# Patient Record
Sex: MALE | Race: WHITE | NOT HISPANIC OR LATINO
[De-identification: names, ages, dates, MRNs, and addresses within clinical notes are randomized per-mention and may not be internally consistent; named-entity substitution may affect disease eponyms.]

---

## 2017-01-28 PROBLEM — Z00.00 ENCOUNTER FOR PREVENTIVE HEALTH EXAMINATION: Status: ACTIVE | Noted: 2017-01-28

## 2017-02-06 ENCOUNTER — APPOINTMENT (OUTPATIENT)
Dept: OPHTHALMOLOGY | Facility: CLINIC | Age: 77
End: 2017-02-06

## 2017-02-17 ENCOUNTER — APPOINTMENT (OUTPATIENT)
Dept: OPHTHALMOLOGY | Facility: AMBULATORY SURGERY CENTER | Age: 77
End: 2017-02-17

## 2017-02-18 ENCOUNTER — APPOINTMENT (OUTPATIENT)
Dept: OPHTHALMOLOGY | Facility: CLINIC | Age: 77
End: 2017-02-18

## 2017-02-23 ENCOUNTER — APPOINTMENT (OUTPATIENT)
Dept: OPHTHALMOLOGY | Facility: CLINIC | Age: 77
End: 2017-02-23

## 2017-03-20 ENCOUNTER — APPOINTMENT (OUTPATIENT)
Dept: OPHTHALMOLOGY | Facility: CLINIC | Age: 77
End: 2017-03-20

## 2017-07-10 ENCOUNTER — APPOINTMENT (OUTPATIENT)
Dept: OPHTHALMOLOGY | Facility: CLINIC | Age: 77
End: 2017-07-10

## 2018-02-05 ENCOUNTER — APPOINTMENT (OUTPATIENT)
Dept: OPHTHALMOLOGY | Facility: CLINIC | Age: 78
End: 2018-02-05

## 2020-10-12 ENCOUNTER — INPATIENT (INPATIENT)
Facility: HOSPITAL | Age: 80
LOS: 4 days | Discharge: HOME | DRG: 377 | End: 2020-10-17
Attending: INTERNAL MEDICINE | Admitting: INTERNAL MEDICINE
Payer: MEDICARE

## 2020-10-12 VITALS
RESPIRATION RATE: 18 BRPM | WEIGHT: 171.52 LBS | HEART RATE: 83 BPM | OXYGEN SATURATION: 100 % | TEMPERATURE: 98 F | SYSTOLIC BLOOD PRESSURE: 119 MMHG | DIASTOLIC BLOOD PRESSURE: 50 MMHG

## 2020-10-12 DIAGNOSIS — R55 SYNCOPE AND COLLAPSE: ICD-10-CM

## 2020-10-12 DIAGNOSIS — K92.2 GASTROINTESTINAL HEMORRHAGE, UNSPECIFIED: ICD-10-CM

## 2020-10-12 DIAGNOSIS — R06.00 DYSPNEA, UNSPECIFIED: ICD-10-CM

## 2020-10-12 DIAGNOSIS — D64.9 ANEMIA, UNSPECIFIED: ICD-10-CM

## 2020-10-12 DIAGNOSIS — Z29.9 ENCOUNTER FOR PROPHYLACTIC MEASURES, UNSPECIFIED: ICD-10-CM

## 2020-10-12 DIAGNOSIS — E11.9 TYPE 2 DIABETES MELLITUS WITHOUT COMPLICATIONS: ICD-10-CM

## 2020-10-12 LAB
ALBUMIN SERPL ELPH-MCNC: 2.4 G/DL — LOW (ref 3.5–5)
ALP SERPL-CCNC: 56 U/L — SIGNIFICANT CHANGE UP (ref 40–120)
ALT FLD-CCNC: 21 U/L DA — SIGNIFICANT CHANGE UP (ref 10–60)
ANION GAP SERPL CALC-SCNC: 9 MMOL/L — SIGNIFICANT CHANGE UP (ref 5–17)
APPEARANCE UR: ABNORMAL
APTT BLD: 24.9 SEC — LOW (ref 27.5–35.5)
AST SERPL-CCNC: 28 U/L — SIGNIFICANT CHANGE UP (ref 10–40)
BASOPHILS # BLD AUTO: 0 K/UL — SIGNIFICANT CHANGE UP (ref 0–0.2)
BASOPHILS NFR BLD AUTO: 0 % — SIGNIFICANT CHANGE UP (ref 0–2)
BILIRUB SERPL-MCNC: 0.4 MG/DL — SIGNIFICANT CHANGE UP (ref 0.2–1.2)
BILIRUB UR-MCNC: NEGATIVE — SIGNIFICANT CHANGE UP
BUN SERPL-MCNC: 44 MG/DL — HIGH (ref 7–18)
CALCIUM SERPL-MCNC: 8.7 MG/DL — SIGNIFICANT CHANGE UP (ref 8.4–10.5)
CHLORIDE SERPL-SCNC: 104 MMOL/L — SIGNIFICANT CHANGE UP (ref 96–108)
CO2 SERPL-SCNC: 22 MMOL/L — SIGNIFICANT CHANGE UP (ref 22–31)
COLOR SPEC: YELLOW — SIGNIFICANT CHANGE UP
CREAT SERPL-MCNC: 1.84 MG/DL — HIGH (ref 0.5–1.3)
DIFF PNL FLD: ABNORMAL
EOSINOPHIL # BLD AUTO: 0.09 K/UL — SIGNIFICANT CHANGE UP (ref 0–0.5)
EOSINOPHIL NFR BLD AUTO: 1.6 % — SIGNIFICANT CHANGE UP (ref 0–6)
FERRITIN SERPL-MCNC: 13 NG/ML — LOW (ref 30–400)
GLUCOSE BLDC GLUCOMTR-MCNC: 112 MG/DL — HIGH (ref 70–99)
GLUCOSE SERPL-MCNC: 147 MG/DL — HIGH (ref 70–99)
GLUCOSE UR QL: NEGATIVE — SIGNIFICANT CHANGE UP
HCT VFR BLD CALC: 11.9 % — CRITICAL LOW (ref 39–50)
HCT VFR BLD CALC: 12.7 % — CRITICAL LOW (ref 39–50)
HGB BLD-MCNC: 3.6 G/DL — CRITICAL LOW (ref 13–17)
HGB BLD-MCNC: 3.9 G/DL — CRITICAL LOW (ref 13–17)
IMM GRANULOCYTES NFR BLD AUTO: 0.4 % — SIGNIFICANT CHANGE UP (ref 0–1.5)
INR BLD: 1.24 RATIO — HIGH (ref 0.88–1.16)
IRON SATN MFR SERPL: 11 UG/DL — LOW (ref 65–170)
IRON SATN MFR SERPL: 4 % — LOW (ref 20–55)
KETONES UR-MCNC: NEGATIVE — SIGNIFICANT CHANGE UP
LEUKOCYTE ESTERASE UR-ACNC: ABNORMAL
LYMPHOCYTES # BLD AUTO: 0.33 K/UL — LOW (ref 1–3.3)
LYMPHOCYTES # BLD AUTO: 5.8 % — LOW (ref 13–44)
MCHC RBC-ENTMCNC: 24.5 PG — LOW (ref 27–34)
MCHC RBC-ENTMCNC: 30.7 GM/DL — LOW (ref 32–36)
MCV RBC AUTO: 79.9 FL — LOW (ref 80–100)
MONOCYTES # BLD AUTO: 0.64 K/UL — SIGNIFICANT CHANGE UP (ref 0–0.9)
MONOCYTES NFR BLD AUTO: 11.3 % — SIGNIFICANT CHANGE UP (ref 2–14)
NEUTROPHILS # BLD AUTO: 4.59 K/UL — SIGNIFICANT CHANGE UP (ref 1.8–7.4)
NEUTROPHILS NFR BLD AUTO: 80.9 % — HIGH (ref 43–77)
NITRITE UR-MCNC: NEGATIVE — SIGNIFICANT CHANGE UP
NRBC # BLD: 0 /100 WBCS — SIGNIFICANT CHANGE UP (ref 0–0)
OB PNL STL: NEGATIVE — SIGNIFICANT CHANGE UP
PH UR: 5 — SIGNIFICANT CHANGE UP (ref 5–8)
PLATELET # BLD AUTO: 224 K/UL — SIGNIFICANT CHANGE UP (ref 150–400)
POTASSIUM SERPL-MCNC: 4.6 MMOL/L — SIGNIFICANT CHANGE UP (ref 3.5–5.3)
POTASSIUM SERPL-SCNC: 4.6 MMOL/L — SIGNIFICANT CHANGE UP (ref 3.5–5.3)
PROT SERPL-MCNC: 5.9 G/DL — LOW (ref 6–8.3)
PROT UR-MCNC: 100
PROTHROM AB SERPL-ACNC: 14.6 SEC — HIGH (ref 10.6–13.6)
RBC # BLD: 1.59 M/UL — LOW (ref 4.2–5.8)
RBC # FLD: 18.6 % — HIGH (ref 10.3–14.5)
SARS-COV-2 RNA SPEC QL NAA+PROBE: SIGNIFICANT CHANGE UP
SODIUM SERPL-SCNC: 135 MMOL/L — SIGNIFICANT CHANGE UP (ref 135–145)
SP GR SPEC: 1.02 — SIGNIFICANT CHANGE UP (ref 1.01–1.02)
TIBC SERPL-MCNC: 316 UG/DL — SIGNIFICANT CHANGE UP (ref 250–450)
TROPONIN I SERPL-MCNC: <0.015 NG/ML — SIGNIFICANT CHANGE UP (ref 0–0.04)
UIBC SERPL-MCNC: 305 UG/DL — SIGNIFICANT CHANGE UP (ref 110–370)
UROBILINOGEN FLD QL: NEGATIVE — SIGNIFICANT CHANGE UP
WBC # BLD: 5.67 K/UL — SIGNIFICANT CHANGE UP (ref 3.8–10.5)
WBC # FLD AUTO: 5.67 K/UL — SIGNIFICANT CHANGE UP (ref 3.8–10.5)

## 2020-10-12 PROCEDURE — 74174 CTA ABD&PLVS W/CONTRAST: CPT | Mod: 26

## 2020-10-12 PROCEDURE — 99285 EMERGENCY DEPT VISIT HI MDM: CPT

## 2020-10-12 RX ORDER — INSULIN LISPRO 100/ML
VIAL (ML) SUBCUTANEOUS
Refills: 0 | Status: DISCONTINUED | OUTPATIENT
Start: 2020-10-12 | End: 2020-10-15

## 2020-10-12 RX ORDER — PANTOPRAZOLE SODIUM 20 MG/1
40 TABLET, DELAYED RELEASE ORAL
Refills: 0 | Status: DISCONTINUED | OUTPATIENT
Start: 2020-10-12 | End: 2020-10-17

## 2020-10-12 NOTE — H&P ADULT - HISTORY OF PRESENT ILLNESS
80M from home, self-ambulating with PMH of chronic anemia, DM, chronic suprapubic catheter, chronic leg swelling, comes to the ED after a syncopal episode today. Pt was in his usual state of health until 4 days ago when he first noted black tarry stool. He had a repeat episode the nest day and has been feeling weak and lethargic since then. He called his doctor friends in [place] who suggested him to visit ED. Today morning when he got off from his bed he was noted to be dizzy and then fell down. Pt doesn't remember distinctly but thinks he had LOC. Pt denies any chest pain, shortness of breath, palpitations, N/V, Abd pain, diarrhea or any NSAID or blood thinner use.   Pt stays alone at home. 8 months ago his wife was diagnosed with Brain cancer and is admitted in the [facility].     In the ED,   Pt appears comfortable, no signs of any distress.   No active bleeding in ED   Vitals- 114/52, Hr 85, afebrile   CT abd- Cirrohtic liver with portal HTN, varices, concerns for SVT   Hb- 3.8  Pt received 1 unit of PRBC. PLan to give 2 more 80M from home, self-ambulating with PMH of chronic anemia, DM, chronic suprapubic catheter, chronic leg swelling, comes to the ED after a syncopal episode today. Pt was in his usual state of health until 4 days ago when he first noted black tarry stool. He had a repeat episode the nest day and has been feeling weak and lethargic since then. He called his doctor friends in [place] who suggested him to visit ED. Today morning when he got off from his bed he was noted to be dizzy and then fell down. Pt doesn't remember distinctly but thinks he had LOC. Pt denies any chest pain, shortness of breath, palpitations, N/V, Abd pain, diarrhea or any NSAID or blood thinner use.   Pt stays alone at home. 8 months ago his wife was diagnosed with Brain cancer and is admitted in the [facility].   Pt denies any smoking, alcohol or any form of substance abuse.   In the ED,   Pt appears comfortable, no signs of any distress.   No active bleeding in ED   Vitals- 114/52, Hr 85, afebrile   CT abd- Cirrohtic liver with portal HTN, varices, concerns for SVT   Hb- 3.8  Pt received 1 unit of PRBC. PLan to give 2 more

## 2020-10-12 NOTE — ED ADULT TRIAGE NOTE - CHIEF COMPLAINT QUOTE
dizziness for 4 days with constipation and lethargy. Pt. has black stool two days ago. Pt. has chronic Morgan catheter.

## 2020-10-12 NOTE — H&P ADULT - NSHPLABSRESULTS_GEN_ALL_CORE
3.6    x     )-----------( x        ( 12 Oct 2020 17:24 )             11.9       10-12    135  |  104  |  44<H>  ----------------------------<  147<H>  4.6   |  22  |  1.84<H>    Ca    8.7      12 Oct 2020 16:50    TPro  5.9<L>  /  Alb  2.4<L>  /  TBili  0.4  /  DBili  x   /  AST  28  /  ALT  21  /  AlkPhos  56  10-12              Urinalysis Basic - ( 12 Oct 2020 17:24 )    Color: Yellow / Appearance: Slightly Turbid / S.020 / pH: x  Gluc: x / Ketone: Negative  / Bili: Negative / Urobili: Negative   Blood: x / Protein: 100 / Nitrite: Negative   Leuk Esterase: Moderate / RBC: 2-5 /HPF / WBC 26-50 /HPF   Sq Epi: x / Non Sq Epi: Occasional /HPF / Bacteria: Few /HPF        PT/INR - ( 12 Oct 2020 16:50 )   PT: 14.6 sec;   INR: 1.24 ratio         PTT - ( 12 Oct 2020 16:50 )  PTT:24.9 sec    Lactate Trend      CARDIAC MARKERS ( 12 Oct 2020 16:50 )  <0.015 ng/mL / x     / x     / x     / x            CAPILLARY BLOOD GLUCOSE      POCT Blood Glucose.: 186 mg/dL (12 Oct 2020 15:56)        ct< from: CT Angio Abdomen and Pelvis w/ IV Cont (10.12.20 @ 18:15) >    IMPRESSION:  No active extravasation.    Cirrhotic liver with stigmata of portal hypertension with extensive left gastric, splenic and mesenteric varices.    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    Mild right hydroureteronephrosis without demonstratable obstructing lesions.    Multiple gallstones.    < end of copied text >

## 2020-10-12 NOTE — H&P ADULT - NSHPSOCIALHISTORY_GEN_ALL_CORE
Pt stays alone at home. 8 months ago his wife was diagnosed with Brain cancer and is admitted in the [facility]. Pt usuallly gets his food delivered

## 2020-10-12 NOTE — H&P ADULT - ASSESSMENT
80M from home, self-ambulating with PMH of chronic anemia, DM, chronic suprapubic catheter, chronic leg swelling, comes to the ED after a syncopal episode today. Pt was in his usual state of health until 4 days ago when he first noted black tarry stool.     In the ED,   Pt appears comfortable, no signs of any distress.   No active bleeding in ED   Vitals- 114/52, Hr 85, afebrile   CT abd- Cirrohtic liver with portal HTN, varices, concerns for SVT   Hb- 3.8  Pt received 1 unit of PRBC. PLan to give 2 more

## 2020-10-12 NOTE — ED ADULT NURSE NOTE - OBJECTIVE STATEMENT
BIba c/o dark stools 4 days  ago  and dizziness, has suprapubic catheter, no active bleeding noted, now c/o constipation

## 2020-10-12 NOTE — ED PROVIDER NOTE - OBJECTIVE STATEMENT
79 y/o male with PMHx of DM, chronic anemia presents to the ED c/o dizziness x 4 days. Pt notes lightheaded dizziness where he feels like he is going to collapse after walking short distances. Pt notes associated melena, which he has never had before. Pt relays feeling fatigued and generally unwell. Pt with Hx suprapubic joya catheter due to chronic urinary retention. Pt denies nausea, vomiting, chest pain, shortness of breath, or any other complaints. No urinary complications related to the catheter. NKDA.

## 2020-10-12 NOTE — H&P ADULT - PROBLEM SELECTOR PLAN 5
IMPROVE VTE Individual Risk Assessment    RISK                                                          Points  [] Previous VTE                                           3  [] Thrombophilia                                        2  [] Lower limb paralysis                              2   [] Current Cancer                                       2   [x] Immobilization > 24 hrs                        1  [] ICU/CCU stay > 24 hours                       1  [x Age > 60                                                   1    IMPROVE VTE Score: 2  No dvt ppx for gi bleeding, pT ON SCD  PP1

## 2020-10-12 NOTE — H&P ADULT - PROBLEM SELECTOR PLAN 1
Pt admitted for syncopal episode today  Pt has justin x 4 days  No prior episodes before.  CT abd- Cirrohtic liver with portal HTN, varices, concerns for SVT   Hb on admission 3.8   s/p 1 prbc. Ordered 2 more   Will f/u CBC post tranfusions  lasix to be given after the second PRBC  f/u hepatitis panel   f/u USG abd for cirrohsis   f/u ct chest to r/o any underlying malignancy   GI -  consult Dr. Lopez  Hematology- [facility]  f/u CBC q8   Pt has PMH of ?Hepatitis in past   CTA abd-

## 2020-10-12 NOTE — H&P ADULT - ATTENDING COMMENTS
80M from home, self-ambulating with PMH of chronic anemia, DM, chronic suprapubic catheter, chronic leg swelling, comes to the ED for dizziness associated black tarry stools x 3 days.   Patient seen and examined earlier today at 8 pm.     T(C): 36.6 (10-12-20 @ 21:15), Max: 36.9 (10-12-20 @ 18:45)  HR: 85 (10-12-20 @ 21:15) (85 - 96)  BP: 114/52 (10-12-20 @ 21:15) (110/65 - 120/62)  RR: 18 (10-12-20 @ 21:15) (16 - 18)  SpO2: 100% (10-12-20 @ 21:15) (100% - 100%)    Reviewed labs and imaging. 80M from home, self-ambulating with PMH of chronic anemia, DM, chronic suprapubic catheter, chronic leg swelling, comes to the ED for dizziness associated black tarry stools x 3 days.   Patient seen and examined earlier today at 8 pm.     T(C): 36.6 (10-12-20 @ 21:15), Max: 36.9 (10-12-20 @ 18:45)  HR: 85 (10-12-20 @ 21:15) (85 - 96)  BP: 114/52 (10-12-20 @ 21:15) (110/65 - 120/62)  RR: 18 (10-12-20 @ 21:15) (16 - 18)  SpO2: 100% (10-12-20 @ 21:15) (100% - 100%)    Reviewed labs and imaging.  Symptomatic anemia- Transfuse PRBC, PTCBC. GI consult. Reviewed CT findings with House staff, with varices splenic thrombosis- likely Liver cirrhosis. Patient states that he has hx Hepatitis. Check Hep panel. CT chest r/o occult malignancy.   Follow up Abd sono.

## 2020-10-12 NOTE — ED PROVIDER NOTE - CLINICAL SUMMARY MEDICAL DECISION MAKING FREE TEXT BOX
80M with generalized weakness and pallor. Suspect GI bleed. CTA, abd labs, cardiac labs, type&screen and reassess.

## 2020-10-12 NOTE — H&P ADULT - NSHPPHYSICALEXAM_GEN_ALL_CORE
Vital Signs (24 Hrs):  T(C): 36.6 (10-12-20 @ 21:15), Max: 36.9 (10-12-20 @ 18:45)  HR: 85 (10-12-20 @ 21:15) (85 - 96)  BP: 114/52 (10-12-20 @ 21:15) (110/65 - 120/62)  RR: 18 (10-12-20 @ 21:15) (16 - 18)  SpO2: 100% (10-12-20 @ 21:15) (100% - 100%)  Wt(kg): --  Daily Height in cm: 182.88 (12 Oct 2020 15:51)    Daily     I&O's Summary

## 2020-10-12 NOTE — ED PROVIDER NOTE - MUSCULOSKELETAL, MLM
Spine appears normal, range of motion is not limited, no muscle or joint tenderness.  BLE 2+ PITTING EDEMA.

## 2020-10-12 NOTE — H&P ADULT - PROBLEM SELECTOR PLAN 4
Pt had PMH of syncopy today, unwitnessed  Pt mentions being dizzy after he woke up from sleep and walked towards restroom  EKG - NSR   trops x1 negative   f/u Orthostatics, Carotid and Echo   Symptoms likely from anemia   Monitor on telemetry

## 2020-10-12 NOTE — ED ADULT NURSE NOTE - NS ED NURSE RECORD ANOTHER VITAL SIGN
Yes

## 2020-10-13 DIAGNOSIS — N17.9 ACUTE KIDNEY FAILURE, UNSPECIFIED: ICD-10-CM

## 2020-10-13 DIAGNOSIS — K74.60 UNSPECIFIED CIRRHOSIS OF LIVER: ICD-10-CM

## 2020-10-13 DIAGNOSIS — D64.9 ANEMIA, UNSPECIFIED: ICD-10-CM

## 2020-10-13 DIAGNOSIS — Z71.89 OTHER SPECIFIED COUNSELING: ICD-10-CM

## 2020-10-13 DIAGNOSIS — Z02.9 ENCOUNTER FOR ADMINISTRATIVE EXAMINATIONS, UNSPECIFIED: ICD-10-CM

## 2020-10-13 LAB
ALBUMIN SERPL ELPH-MCNC: 2.2 G/DL — LOW (ref 3.5–5)
ALP SERPL-CCNC: 54 U/L — SIGNIFICANT CHANGE UP (ref 40–120)
ALT FLD-CCNC: 18 U/L DA — SIGNIFICANT CHANGE UP (ref 10–60)
ANION GAP SERPL CALC-SCNC: 7 MMOL/L — SIGNIFICANT CHANGE UP (ref 5–17)
APTT BLD: 22.7 SEC — LOW (ref 27.5–35.5)
AST SERPL-CCNC: 24 U/L — SIGNIFICANT CHANGE UP (ref 10–40)
BASOPHILS # BLD AUTO: 0.01 K/UL — SIGNIFICANT CHANGE UP (ref 0–0.2)
BASOPHILS NFR BLD AUTO: 0.2 % — SIGNIFICANT CHANGE UP (ref 0–2)
BILIRUB SERPL-MCNC: 1.1 MG/DL — SIGNIFICANT CHANGE UP (ref 0.2–1.2)
BUN SERPL-MCNC: 41 MG/DL — HIGH (ref 7–18)
CALCIUM SERPL-MCNC: 8.2 MG/DL — LOW (ref 8.4–10.5)
CHLORIDE SERPL-SCNC: 105 MMOL/L — SIGNIFICANT CHANGE UP (ref 96–108)
CHOLEST SERPL-MCNC: 110 MG/DL — SIGNIFICANT CHANGE UP (ref 10–199)
CO2 SERPL-SCNC: 25 MMOL/L — SIGNIFICANT CHANGE UP (ref 22–31)
CREAT SERPL-MCNC: 1.43 MG/DL — HIGH (ref 0.5–1.3)
EOSINOPHIL # BLD AUTO: 0.26 K/UL — SIGNIFICANT CHANGE UP (ref 0–0.5)
EOSINOPHIL NFR BLD AUTO: 5.4 % — SIGNIFICANT CHANGE UP (ref 0–6)
FERRITIN SERPL-MCNC: 13 NG/ML — LOW (ref 30–400)
FOLATE SERPL-MCNC: 7.7 NG/ML — SIGNIFICANT CHANGE UP
GLUCOSE BLDC GLUCOMTR-MCNC: 100 MG/DL — HIGH (ref 70–99)
GLUCOSE BLDC GLUCOMTR-MCNC: 103 MG/DL — HIGH (ref 70–99)
GLUCOSE BLDC GLUCOMTR-MCNC: 106 MG/DL — HIGH (ref 70–99)
GLUCOSE BLDC GLUCOMTR-MCNC: 199 MG/DL — HIGH (ref 70–99)
GLUCOSE BLDC GLUCOMTR-MCNC: 238 MG/DL — HIGH (ref 70–99)
GLUCOSE SERPL-MCNC: 92 MG/DL — SIGNIFICANT CHANGE UP (ref 70–99)
HAV IGM SER-ACNC: SIGNIFICANT CHANGE UP
HBV CORE IGM SER-ACNC: SIGNIFICANT CHANGE UP
HBV SURFACE AG SER-ACNC: SIGNIFICANT CHANGE UP
HCT VFR BLD CALC: 21 % — CRITICAL LOW (ref 39–50)
HCT VFR BLD CALC: 23.4 % — LOW (ref 39–50)
HCT VFR BLD CALC: 23.7 % — LOW (ref 39–50)
HCV AB S/CO SERPL IA: 0.14 S/CO — SIGNIFICANT CHANGE UP (ref 0–0.99)
HCV AB SERPL-IMP: SIGNIFICANT CHANGE UP
HDLC SERPL-MCNC: 25 MG/DL — LOW
HGB BLD-MCNC: 7.1 G/DL — LOW (ref 13–17)
HGB BLD-MCNC: 8.1 G/DL — LOW (ref 13–17)
HGB BLD-MCNC: 8.1 G/DL — LOW (ref 13–17)
IMM GRANULOCYTES NFR BLD AUTO: 0.4 % — SIGNIFICANT CHANGE UP (ref 0–1.5)
INR BLD: 1.18 RATIO — HIGH (ref 0.88–1.16)
LACTATE SERPL-SCNC: 1 MMOL/L — SIGNIFICANT CHANGE UP (ref 0.7–2)
LDH SERPL L TO P-CCNC: 347 U/L — HIGH (ref 120–225)
LIPID PNL WITH DIRECT LDL SERPL: 67 MG/DL — SIGNIFICANT CHANGE UP
LYMPHOCYTES # BLD AUTO: 0.37 K/UL — LOW (ref 1–3.3)
LYMPHOCYTES # BLD AUTO: 7.7 % — LOW (ref 13–44)
MAGNESIUM SERPL-MCNC: 1.6 MG/DL — SIGNIFICANT CHANGE UP (ref 1.6–2.6)
MCHC RBC-ENTMCNC: 27.4 PG — SIGNIFICANT CHANGE UP (ref 27–34)
MCHC RBC-ENTMCNC: 27.6 PG — SIGNIFICANT CHANGE UP (ref 27–34)
MCHC RBC-ENTMCNC: 27.8 PG — SIGNIFICANT CHANGE UP (ref 27–34)
MCHC RBC-ENTMCNC: 33.8 GM/DL — SIGNIFICANT CHANGE UP (ref 32–36)
MCHC RBC-ENTMCNC: 34.2 GM/DL — SIGNIFICANT CHANGE UP (ref 32–36)
MCHC RBC-ENTMCNC: 34.6 GM/DL — SIGNIFICANT CHANGE UP (ref 32–36)
MCV RBC AUTO: 80.4 FL — SIGNIFICANT CHANGE UP (ref 80–100)
MCV RBC AUTO: 80.6 FL — SIGNIFICANT CHANGE UP (ref 80–100)
MCV RBC AUTO: 81.1 FL — SIGNIFICANT CHANGE UP (ref 80–100)
MONOCYTES # BLD AUTO: 0.59 K/UL — SIGNIFICANT CHANGE UP (ref 0–0.9)
MONOCYTES NFR BLD AUTO: 12.3 % — SIGNIFICANT CHANGE UP (ref 2–14)
NEUTROPHILS # BLD AUTO: 3.53 K/UL — SIGNIFICANT CHANGE UP (ref 1.8–7.4)
NEUTROPHILS NFR BLD AUTO: 74 % — SIGNIFICANT CHANGE UP (ref 43–77)
NRBC # BLD: 0 /100 WBCS — SIGNIFICANT CHANGE UP (ref 0–0)
PHOSPHATE SERPL-MCNC: 2.5 MG/DL — SIGNIFICANT CHANGE UP (ref 2.5–4.5)
PLATELET # BLD AUTO: 215 K/UL — SIGNIFICANT CHANGE UP (ref 150–400)
PLATELET # BLD AUTO: 216 K/UL — SIGNIFICANT CHANGE UP (ref 150–400)
PLATELET # BLD AUTO: 233 K/UL — SIGNIFICANT CHANGE UP (ref 150–400)
POTASSIUM SERPL-MCNC: 4.3 MMOL/L — SIGNIFICANT CHANGE UP (ref 3.5–5.3)
POTASSIUM SERPL-SCNC: 4.3 MMOL/L — SIGNIFICANT CHANGE UP (ref 3.5–5.3)
PROT SERPL-MCNC: 5.6 G/DL — LOW (ref 6–8.3)
PROTHROM AB SERPL-ACNC: 13.9 SEC — HIGH (ref 10.6–13.6)
RBC # BLD: 2.59 M/UL — LOW (ref 4.2–5.8)
RBC # BLD: 2.61 M/UL — LOW (ref 4.2–5.8)
RBC # BLD: 2.91 M/UL — LOW (ref 4.2–5.8)
RBC # BLD: 2.94 M/UL — LOW (ref 4.2–5.8)
RBC # FLD: 16.4 % — HIGH (ref 10.3–14.5)
RBC # FLD: 16.6 % — HIGH (ref 10.3–14.5)
RBC # FLD: 16.7 % — HIGH (ref 10.3–14.5)
RETICS #: 72.6 K/UL — SIGNIFICANT CHANGE UP (ref 25–125)
RETICS/RBC NFR: 2.8 % — HIGH (ref 0.5–2.5)
SARS-COV-2 IGG SERPL QL IA: NEGATIVE — SIGNIFICANT CHANGE UP
SARS-COV-2 IGM SERPL IA-ACNC: <3.8 AU/ML — SIGNIFICANT CHANGE UP
SODIUM SERPL-SCNC: 137 MMOL/L — SIGNIFICANT CHANGE UP (ref 135–145)
TOTAL CHOLESTEROL/HDL RATIO MEASUREMENT: 4.4 RATIO — SIGNIFICANT CHANGE UP (ref 3.4–9.6)
TRIGL SERPL-MCNC: 89 MG/DL — SIGNIFICANT CHANGE UP (ref 10–149)
TROPONIN I SERPL-MCNC: <0.015 NG/ML — SIGNIFICANT CHANGE UP (ref 0–0.04)
TSH SERPL-MCNC: 6.58 UU/ML — HIGH (ref 0.34–4.82)
VIT B12 SERPL-MCNC: 651 PG/ML — SIGNIFICANT CHANGE UP (ref 232–1245)
WBC # BLD: 4.78 K/UL — SIGNIFICANT CHANGE UP (ref 3.8–10.5)
WBC # BLD: 5.34 K/UL — SIGNIFICANT CHANGE UP (ref 3.8–10.5)
WBC # BLD: 6.42 K/UL — SIGNIFICANT CHANGE UP (ref 3.8–10.5)
WBC # FLD AUTO: 4.78 K/UL — SIGNIFICANT CHANGE UP (ref 3.8–10.5)
WBC # FLD AUTO: 5.34 K/UL — SIGNIFICANT CHANGE UP (ref 3.8–10.5)
WBC # FLD AUTO: 6.42 K/UL — SIGNIFICANT CHANGE UP (ref 3.8–10.5)

## 2020-10-13 PROCEDURE — 93880 EXTRACRANIAL BILAT STUDY: CPT | Mod: 26

## 2020-10-13 PROCEDURE — 71250 CT THORAX DX C-: CPT | Mod: 26

## 2020-10-13 PROCEDURE — 93971 EXTREMITY STUDY: CPT | Mod: 26,LT

## 2020-10-13 RX ORDER — CEFTRIAXONE 500 MG/1
1000 INJECTION, POWDER, FOR SOLUTION INTRAMUSCULAR; INTRAVENOUS EVERY 24 HOURS
Refills: 0 | Status: DISCONTINUED | OUTPATIENT
Start: 2020-10-14 | End: 2020-10-15

## 2020-10-13 RX ORDER — CEFTRIAXONE 500 MG/1
1000 INJECTION, POWDER, FOR SOLUTION INTRAMUSCULAR; INTRAVENOUS ONCE
Refills: 0 | Status: COMPLETED | OUTPATIENT
Start: 2020-10-13 | End: 2020-10-13

## 2020-10-13 RX ORDER — MORPHINE SULFATE 50 MG/1
2 CAPSULE, EXTENDED RELEASE ORAL EVERY 6 HOURS
Refills: 0 | Status: DISCONTINUED | OUTPATIENT
Start: 2020-10-13 | End: 2020-10-17

## 2020-10-13 RX ORDER — FUROSEMIDE 40 MG
20 TABLET ORAL ONCE
Refills: 0 | Status: COMPLETED | OUTPATIENT
Start: 2020-10-13 | End: 2020-10-13

## 2020-10-13 RX ORDER — IRON SUCROSE 20 MG/ML
200 INJECTION, SOLUTION INTRAVENOUS ONCE
Refills: 0 | Status: COMPLETED | OUTPATIENT
Start: 2020-10-13 | End: 2020-10-13

## 2020-10-13 RX ORDER — OCTREOTIDE ACETATE 200 UG/ML
50 INJECTION, SOLUTION INTRAVENOUS; SUBCUTANEOUS
Qty: 500 | Refills: 0 | Status: DISCONTINUED | OUTPATIENT
Start: 2020-10-13 | End: 2020-10-15

## 2020-10-13 RX ORDER — CEFTRIAXONE 500 MG/1
INJECTION, POWDER, FOR SOLUTION INTRAMUSCULAR; INTRAVENOUS
Refills: 0 | Status: DISCONTINUED | OUTPATIENT
Start: 2020-10-13 | End: 2020-10-15

## 2020-10-13 RX ADMIN — MORPHINE SULFATE 2 MILLIGRAM(S): 50 CAPSULE, EXTENDED RELEASE ORAL at 18:46

## 2020-10-13 RX ADMIN — Medication 20 MILLIGRAM(S): at 01:46

## 2020-10-13 RX ADMIN — CEFTRIAXONE 100 MILLIGRAM(S): 500 INJECTION, POWDER, FOR SOLUTION INTRAMUSCULAR; INTRAVENOUS at 08:49

## 2020-10-13 RX ADMIN — OCTREOTIDE ACETATE 10 MICROGRAM(S)/HR: 200 INJECTION, SOLUTION INTRAVENOUS; SUBCUTANEOUS at 16:42

## 2020-10-13 RX ADMIN — IRON SUCROSE 110 MILLIGRAM(S): 20 INJECTION, SOLUTION INTRAVENOUS at 17:45

## 2020-10-13 RX ADMIN — Medication 1: at 17:11

## 2020-10-13 RX ADMIN — PANTOPRAZOLE SODIUM 40 MILLIGRAM(S): 20 TABLET, DELAYED RELEASE ORAL at 17:11

## 2020-10-13 RX ADMIN — MORPHINE SULFATE 2 MILLIGRAM(S): 50 CAPSULE, EXTENDED RELEASE ORAL at 19:00

## 2020-10-13 RX ADMIN — PANTOPRAZOLE SODIUM 40 MILLIGRAM(S): 20 TABLET, DELAYED RELEASE ORAL at 05:58

## 2020-10-13 NOTE — PROGRESS NOTE ADULT - SUBJECTIVE AND OBJECTIVE BOX
NP Note     Patient is a 80y old  Male who presents with a chief complaint of SYNCOPY (13 Oct 2020 14:58)    HPI:  Patient is 80y  from home, self-ambulating with PMH of chronic anemia, DM, chronic suprapubic catheter, chronic leg swelling, comes to the ED after a syncopal episode. Pt was in his usual state of health until 4 days ago when he first noted black tarry stool. He had a repeat episode the next day and has been feeling weak and lethargic since then. Monday morning when he got off from his bed he was noted to be dizzy and then fell down. Pt doesn't remember distinctly but thinks he had LOC. Pt denies any chest pain, shortness of breath, palpitations, N/V, Abd pain, diarrhea or any NSAID or blood thinner use.   Pt stays alone at home. 8 months ago his wife was diagnosed with Brain cancer and is admitted in the [facility].   Pt denies any smoking, alcohol or any form of substance abuse.   Patient was admitted to medicine for systematic anemia and syncopal episode.  Patient received total of  3 U PRBC for low H&H (3.9/12.7),   Pt appears comfortable, no signs of any distress, no active bleeding.        INTERVAL HPI/OVERNIGHT EVENTS: no new complaints    MEDICATIONS  (STANDING):  cefTRIAXone   IVPB      insulin lispro (HumaLOG) corrective regimen sliding scale   SubCutaneous three times a day before meals  iron sucrose IVPB 200 milliGRAM(s) IV Intermittent once  octreotide  Infusion 50 MICROgram(s)/Hr (10 mL/Hr) IV Continuous <Continuous>  pantoprazole  Injectable 40 milliGRAM(s) IV Push two times a day    MEDICATIONS  (PRN):      __________________________________________________  REVIEW OF SYSTEMS:    CONSTITUTIONAL: No fever,   EYES: no acute visual disturbances  NECK: No pain or stiffness  RESPIRATORY: No cough; No shortness of breath  CARDIOVASCULAR: No chest pain, no palpitations  GASTROINTESTINAL: No pain. No nausea or vomiting; No diarrhea   NEUROLOGICAL: No headache or numbness, no tremors  MUSCULOSKELETAL: No joint pain, no muscle pain, generalized weakness  GENITOURINARY: no dysuria, no frequency, no hesitancy  PSYCHIATRY: no depression , no anxiety  ALL OTHER  ROS negative        Vital Signs Last 24 Hrs  T(C): 36.4 (13 Oct 2020 14:31), Max: 37.1 (13 Oct 2020 01:15)  T(F): 97.6 (13 Oct 2020 14:31), Max: 98.7 (13 Oct 2020 01:15)  HR: 88 (13 Oct 2020 14:31) (80 - 96)  BP: 130/53 (13 Oct 2020 14:31) (110/65 - 146/67)  BP(mean): 75 (12 Oct 2020 21:15) (75 - 75)  RR: 18 (13 Oct 2020 14:31) (16 - 18)  SpO2: 100% (13 Oct 2020 14:31) (98% - 100%)    ________________________________________________  PHYSICAL EXAM:  GENERAL: NAD  HEENT: Normocephalic;  conjunctivae and sclerae clear; moist mucous membranes;   NECK : supple  CHEST/LUNG: Clear to auscultation bilaterally with good air entry   HEART: S1 S2  regular; no murmurs, gallops or rubs  ABDOMEN: Soft, Nontender, distended; Bowel sounds present  GENITOURINARY: suprapubic catheter  EXTREMITIES: no cyanosis; b/l pitting edema; Left calf tenderness  SKIN: warm and dry; no rash  NERVOUS SYSTEM:  Awake and alert; Oriented  to place, person and time ; no new deficits    _________________________________________________  LABS:                        8.1    5.34  )-----------( 215      ( 13 Oct 2020 12:46 )             23.4     10-13    137  |  105  |  41<H>  ----------------------------<  92  4.3   |  25  |  1.43<H>    Ca    8.2<L>      13 Oct 2020 04:26  Phos  2.5     10-13  Mg     1.6     10-13    TPro  5.6<L>  /  Alb  2.2<L>  /  TBili  1.1  /  DBili  x   /  AST  24  /  ALT  18  /  AlkPhos  54  10-13    PT/INR - ( 13 Oct 2020 04:26 )   PT: 13.9 sec;   INR: 1.18 ratio         PTT - ( 13 Oct 2020 04:26 )  PTT:22.7 sec  Urinalysis Basic - ( 12 Oct 2020 17:24 )    Color: Yellow / Appearance: Slightly Turbid / S.020 / pH: x  Gluc: x / Ketone: Negative  / Bili: Negative / Urobili: Negative   Blood: x / Protein: 100 / Nitrite: Negative   Leuk Esterase: Moderate / RBC: 2-5 /HPF / WBC 26-50 /HPF   Sq Epi: x / Non Sq Epi: Occasional /HPF / Bacteria: Few /HPF      CAPILLARY BLOOD GLUCOSE      POCT Blood Glucose.: 103 mg/dL (13 Oct 2020 12:02)  POCT Blood Glucose.: 100 mg/dL (13 Oct 2020 07:41)  POCT Blood Glucose.: 106 mg/dL (13 Oct 2020 05:59)  POCT Blood Glucose.: 112 mg/dL (12 Oct 2020 23:54)  POCT Blood Glucose.: 186 mg/dL (12 Oct 2020 15:56)        RADIOLOGY & ADDITIONAL TESTS:    EXAM:  CT ANGIO ABD PELV (W)AW IC                            PROCEDURE DATE:  10/12/2020          INTERPRETATION:  CLINICAL INFORMATION: GI bleeding. Abdominal pain.    COMPARISON: None.    PROCEDURE:  CT of the Abdomen and Pelvis was performed with and without intravenous contrast.  Precontrast, Arterial and Delayed phases were performed.  Intravenous contrast: 90 ml Omnipaque 350. 10 ml discarded.  Oral contrast: None.  Sagittal and coronal reformats were performed.    FINDINGS:  LOWER CHEST:Trace bilateral pleural effusion and trace pericardial effusion.    LIVER: Shrunken liver with nodularity likely indicating cirrhosis.  BILE DUCTS: Normal caliber.  GALLBLADDER: Multiple gallstones without evidence of cholecystitis.  SPLEEN: Within normal limits.  PANCREAS: Within normal limits.  ADRENALS: Within normal limits.  KIDNEYS/URETERS: Right extrarenal pelvis with mild hydroureteronephrosis tracing to the level of the junction of the mid and distal ureter. No radiopaque stone is identified.    BLADDER: Suprapubic catheter in place. Collapsed.  REPRODUCTIVE ORGANS: Surgical clips. Prostatectomy?    BOWEL: No bowel obstruction. Appendix is not visualized.  PERITONEUM: Massive ascites. No hemoperitoneum.  VESSELS: No active extravasation. Prominent portal vein and superior mesenteric vein with small sized splenic vein. Extensive collateral veins around the stomach, spleen and mesentery in the left mid abdomen (19-and 90). Moderate atherosclerotic disease.  RETROPERITONEUM/LYMPH NODES: No lymphadenopathy.  ABDOMINAL WALL: Mild anasarca.  BONES: Degenerative changes.    IMPRESSION:  No active extravasation.    Cirrhotic liver with stigmata of portal hypertension with extensive left gastric, splenic and mesenteric varices.    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    Mild right hydroureteronephrosis without demonstratable obstructing lesions.    Multiple gallstones.      SARAH GUAJARDO M.D., ATTENDING RADIOLOGIST  This document has been electronically signed. Oct 12 2020  6:51PM        EXAM:  CT CHEST                            *** ADDENDUM 10/13/2020  ***    NP Mr. Del Toro is informed.    *** END OF ADDENDUM 10/13/2020  ***      PROCEDURE DATE:  10/13/2020          INTERPRETATION:  Chest CT without IV contrast    Indication: Dyspnea.    Technique: Axial multidetector CT images of the chest are acquired without IV contrast.    Comparison: No prior chest CT is available for comparison. Reference is made with a previous abdominal CT dated 10/12/2020.    Findings: Mild bilateral pleural effusions. Mild pericardial effusion. The heart is not enlarged. Aortic, coronary artery, and mitral annular calcifications are present. Mild ectasia of the ascending aorta at 3.7 cm in diameter. Allowing for the noncontrast technique, there isno grossly enlarged mediastinal, hilar, or axillary lymph node.    The trachea and central bronchi are patent. Small linear densities in the trachea, likely representing mucus secretion.    No evidence of pneumothorax, pulmonary consolidation or mass. Small calcified granuloma in the right upper lobe. A few small noncalcified nonspecific lung nodules in the upper lobes bilaterally measuring up to 5 mm in the left upper lobe (image 55 series 3); if the patient's is in the high risk category, follow-up chest CT may be pursued in 12 months to ensure stability. Small bilateral atelectasis.    Limited sections through the upper abdomen again demonstrate nodular contour of the liver, suggestive of cirrhosis. Cholelithiasis. Large ascites again noted. IV contrast excretion in the left collecting system from recent contrast abdominal CT.    Ankylosing spondylitis. Apparent nondisplaced acute horizontal fracture at the inferior T5 vertebra with involvement of the inferior endplate (image 96 series7 and image 87 series 9).    Impression: Apparent nondisplaced acute horizontal fracture at the inferior T5 vertebra with involvement of the inferior endplate.    A few small lung nodules in the upper lobes bilaterally; if the patient's is in the high risk category (i.e. smoker), follow-up chest CT may be pursued in 12 months to ensure stability.    No evidence of pneumothorax, pulmonary consolidation or mass.    Small linear densities in the trachea, likely representing mucus secretion.    Mild bilateral pleural effusions.    Mild pericardial effusion.    ***Please see the addendum at the top of this report. It may contain additional important information or changes.****        ROSSI HOBSON M.D., ATTENDING RADIOLOGIST  This document has been electronically signed. Oct 13 2020 10:05AM  Addend:ROSSI HOBSON M.D., ATTENDING RADIOLOGIST  This addendum was electronically signed on: Oct 13 2020 11:20AM.    EXAM:  US DPLX CAROTIDS COMPL BI                            PROCEDURE DATE:  10/13/2020          INTERPRETATION:  ULTRASOUND OF THE CAROTID ARTERIES    CLINICAL INDICATION: Syncope.    TECHNIQUE:   Doppler ultrasonography of the carotid arteries was performed utilizing grayscale, color and spectral Doppler.   The magnitude of stenosis was estimated based on established tables of systolic peak velocity related to the magnitude of carotid stenosis.    COMPARISON: No prior studies available for comparison.    FINDINGS:  Atherosclerotic plaques are identified within the bilateral common carotid arteries, carotid bulbs, as well as within the bilateral internal and external carotid arteries.    Antegrade flow is present in both vertebral arteries.    Velocities expressed in centimeters per second are as follows:    RIGHT:  Proximal CCA = 95.7; Distal CCA = 98.3; Proximal ICA = 209.3; Distal ICA = 106.6;  ECA = 143.8  LEFT:    Proximal CCA = 97.0; Distal CCA = 115.9; Proximal ICA = 90.4; Distal ICA = 95.6;  ECA = 104.8    Right peak systolic IC/CC velocity ratio: 2.1  Left peak systolic IC/CC velocity ratio: 0.8    IMPRESSION:  1.  Right carotid system:  50-69% right ICA diameter stenosis.  2.  Left carotid system:  No hemodynamically significant stenosis is found.  3. CTA/MRA evaluation can be performed as clinically desired.            EMILIE URENA M.D., ATTENDING RADIOLOGIST  This document has been electronically signed. Oct 13 2020 11:59AM        Imaging  Reviewed:  YES    Consultant(s) Notes Reviewed:   YES      Plan of care was discussed with patient and /or primary care giver; all questions and concerns were addressed NP Note     Patient is a 80y old  Male who presents with a chief complaint of SYNCOPY (13 Oct 2020 14:58)    HPI:  Patient is 80y  from home, self-ambulating with PMH of chronic anemia, DM, chronic suprapubic catheter, chronic leg swelling, comes to the ED after a syncopal episode. Pt was in his usual state of health until 4 days ago when he first noted black tarry stool. He had a repeat episode the next day and has been feeling weak and lethargic since then.      Patient was admitted to medicine for systematic anemia and syncopal episode.  Patient received total of  3 U PRBC for low H&H (3.9/12.7), today H&H 8.1/23.4. 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 GI dr. Alarcon onboard, patient scheduled for EGD and colonoscopy for Thursday. Patient also is followed by heme/oncology for anemia, recommended f/u CBC q 6-8h and blood transfusion if hemoglobin drops <7.  Patient seen by nephrology dr. Forman for KENNY on admission which is improving (Cr 1.43 down from 1.84 on admission) most likely due to severe anemia.    Patient seen and examined at the bedside    Pt appears comfortable, no signs of any distress, no active bleeding. H&H improved 8.1/23.4.   Patient c/o left calf tenderness, US doppler negative for DVT. Pending Echo.    INTERVAL HPI/OVERNIGHT EVENTS: no new complaints    MEDICATIONS  (STANDING):  cefTRIAXone   IVPB      insulin lispro (HumaLOG) corrective regimen sliding scale   SubCutaneous three times a day before meals  iron sucrose IVPB 200 milliGRAM(s) IV Intermittent once  octreotide  Infusion 50 MICROgram(s)/Hr (10 mL/Hr) IV Continuous <Continuous>  pantoprazole  Injectable 40 milliGRAM(s) IV Push two times a day    MEDICATIONS  (PRN):      __________________________________________________  REVIEW OF SYSTEMS:    CONSTITUTIONAL: No fever,   EYES: no acute visual disturbances  NECK: No pain or stiffness  RESPIRATORY: No cough; No shortness of breath  CARDIOVASCULAR: No chest pain, no palpitations  GASTROINTESTINAL: No pain. No nausea or vomiting; No diarrhea   NEUROLOGICAL: No headache or numbness, no tremors  MUSCULOSKELETAL: No joint pain, no muscle pain, generalized weakness  GENITOURINARY: no dysuria, no frequency, no hesitancy  PSYCHIATRY: no depression , no anxiety  ALL OTHER  ROS negative        Vital Signs Last 24 Hrs  T(C): 36.4 (13 Oct 2020 14:31), Max: 37.1 (13 Oct 2020 01:15)  T(F): 97.6 (13 Oct 2020 14:31), Max: 98.7 (13 Oct 2020 01:15)  HR: 88 (13 Oct 2020 14:31) (80 - 96)  BP: 130/53 (13 Oct 2020 14:31) (110/65 - 146/67)  BP(mean): 75 (12 Oct 2020 21:15) (75 - 75)  RR: 18 (13 Oct 2020 14:31) (16 - 18)  SpO2: 100% (13 Oct 2020 14:31) (98% - 100%)    ________________________________________________  PHYSICAL EXAM:  GENERAL: NAD  HEENT: Normocephalic;  conjunctivae and sclerae clear; moist mucous membranes;   NECK : supple  CHEST/LUNG: Clear to auscultation bilaterally with good air entry   HEART: S1 S2  regular; no murmurs, gallops or rubs  ABDOMEN: Soft, Nontender, distended; Bowel sounds present  GENITOURINARY: suprapubic catheter  EXTREMITIES: no cyanosis; b/l pitting edema; Left calf tenderness  SKIN: warm and dry; no rash  NERVOUS SYSTEM:  Awake and alert; Oriented  to place, person and time ; no new deficits    _________________________________________________  LABS:                        8.1    5.34  )-----------( 215      ( 13 Oct 2020 12:46 )             23.4     10-    137  |  105  |  41<H>  ----------------------------<  92  4.3   |  25  |  1.43<H>    Ca    8.2<L>      13 Oct 2020 04:26  Phos  2.5     10-13  Mg     1.6     10-13    TPro  5.6<L>  /  Alb  2.2<L>  /  TBili  1.1  /  DBili  x   /  AST  24  /  ALT  18  /  AlkPhos  54  10-13    PT/INR - ( 13 Oct 2020 04:26 )   PT: 13.9 sec;   INR: 1.18 ratio         PTT - ( 13 Oct 2020 04:26 )  PTT:22.7 sec  Urinalysis Basic - ( 12 Oct 2020 17:24 )    Color: Yellow / Appearance: Slightly Turbid / S.020 / pH: x  Gluc: x / Ketone: Negative  / Bili: Negative / Urobili: Negative   Blood: x / Protein: 100 / Nitrite: Negative   Leuk Esterase: Moderate / RBC: 2-5 /HPF / WBC 26-50 /HPF   Sq Epi: x / Non Sq Epi: Occasional /HPF / Bacteria: Few /HPF      CAPILLARY BLOOD GLUCOSE      POCT Blood Glucose.: 103 mg/dL (13 Oct 2020 12:02)  POCT Blood Glucose.: 100 mg/dL (13 Oct 2020 07:41)  POCT Blood Glucose.: 106 mg/dL (13 Oct 2020 05:59)  POCT Blood Glucose.: 112 mg/dL (12 Oct 2020 23:54)  POCT Blood Glucose.: 186 mg/dL (12 Oct 2020 15:56)        RADIOLOGY & ADDITIONAL TESTS:    EXAM:  CT ANGIO ABD PELV (W)AW IC                            PROCEDURE DATE:  10/12/2020          INTERPRETATION:  CLINICAL INFORMATION: GI bleeding. Abdominal pain.    COMPARISON: None.    PROCEDURE:  CT of the Abdomen and Pelvis was performed with and without intravenous contrast.  Precontrast, Arterial and Delayed phases were performed.  Intravenous contrast: 90 ml Omnipaque 350. 10 ml discarded.  Oral contrast: None.  Sagittal and coronal reformats were performed.    FINDINGS:  LOWER CHEST:Trace bilateral pleural effusion and trace pericardial effusion.    LIVER: Shrunken liver with nodularity likely indicating cirrhosis.  BILE DUCTS: Normal caliber.  GALLBLADDER: Multiple gallstones without evidence of cholecystitis.  SPLEEN: Within normal limits.  PANCREAS: Within normal limits.  ADRENALS: Within normal limits.  KIDNEYS/URETERS: Right extrarenal pelvis with mild hydroureteronephrosis tracing to the level of the junction of the mid and distal ureter. No radiopaque stone is identified.    BLADDER: Suprapubic catheter in place. Collapsed.  REPRODUCTIVE ORGANS: Surgical clips. Prostatectomy?    BOWEL: No bowel obstruction. Appendix is not visualized.  PERITONEUM: Massive ascites. No hemoperitoneum.  VESSELS: No active extravasation. Prominent portal vein and superior mesenteric vein with small sized splenic vein. Extensive collateral veins around the stomach, spleen and mesentery in the left mid abdomen (19-and 90). Moderate atherosclerotic disease.  RETROPERITONEUM/LYMPH NODES: No lymphadenopathy.  ABDOMINAL WALL: Mild anasarca.  BONES: Degenerative changes.    IMPRESSION:  No active extravasation.    Cirrhotic liver with stigmata of portal hypertension with extensive left gastric, splenic and mesenteric varices.    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    Mild right hydroureteronephrosis without demonstratable obstructing lesions.    Multiple gallstones.      SARHA GUAJARDO M.D., ATTENDING RADIOLOGIST  This document has been electronically signed. Oct 12 2020  6:51PM        EXAM:  CT CHEST                            *** ADDENDUM 10/13/2020  ***    NP Mr. Del Toro is informed.    *** END OF ADDENDUM 10/13/2020  ***      PROCEDURE DATE:  10/13/2020          INTERPRETATION:  Chest CT without IV contrast    Indication: Dyspnea.    Technique: Axial multidetector CT images of the chest are acquired without IV contrast.    Comparison: No prior chest CT is available for comparison. Reference is made with a previous abdominal CT dated 10/12/2020.    Findings: Mild bilateral pleural effusions. Mild pericardial effusion. The heart is not enlarged. Aortic, coronary artery, and mitral annular calcifications are present. Mild ectasia of the ascending aorta at 3.7 cm in diameter. Allowing for the noncontrast technique, there isno grossly enlarged mediastinal, hilar, or axillary lymph node.    The trachea and central bronchi are patent. Small linear densities in the trachea, likely representing mucus secretion.    No evidence of pneumothorax, pulmonary consolidation or mass. Small calcified granuloma in the right upper lobe. A few small noncalcified nonspecific lung nodules in the upper lobes bilaterally measuring up to 5 mm in the left upper lobe (image 55 series 3); if the patient's is in the high risk category, follow-up chest CT may be pursued in 12 months to ensure stability. Small bilateral atelectasis.    Limited sections through the upper abdomen again demonstrate nodular contour of the liver, suggestive of cirrhosis. Cholelithiasis. Large ascites again noted. IV contrast excretion in the left collecting system from recent contrast abdominal CT.    Ankylosing spondylitis. Apparent nondisplaced acute horizontal fracture at the inferior T5 vertebra with involvement of the inferior endplate (image 96 series7 and image 87 series 9).    Impression: Apparent nondisplaced acute horizontal fracture at the inferior T5 vertebra with involvement of the inferior endplate.    A few small lung nodules in the upper lobes bilaterally; if the patient's is in the high risk category (i.e. smoker), follow-up chest CT may be pursued in 12 months to ensure stability.    No evidence of pneumothorax, pulmonary consolidation or mass.    Small linear densities in the trachea, likely representing mucus secretion.    Mild bilateral pleural effusions.    Mild pericardial effusion.    ***Please see the addendum at the top of this report. It may contain additional important information or changes.****        ROSSI HOBSON M.D., ATTENDING RADIOLOGIST  This document has been electronically signed. Oct 13 2020 10:05AM  Addend:ROSSI HOBSON M.D., ATTENDING RADIOLOGIST  This addendum was electronically signed on: Oct 13 2020 11:20AM.    EXAM:  US DPLX CAROTIDS COMPL BI                            PROCEDURE DATE:  10/13/2020          INTERPRETATION:  ULTRASOUND OF THE CAROTID ARTERIES    CLINICAL INDICATION: Syncope.    TECHNIQUE:   Doppler ultrasonography of the carotid arteries was performed utilizing grayscale, color and spectral Doppler.   The magnitude of stenosis was estimated based on established tables of systolic peak velocity related to the magnitude of carotid stenosis.    COMPARISON: No prior studies available for comparison.    FINDINGS:  Atherosclerotic plaques are identified within the bilateral common carotid arteries, carotid bulbs, as well as within the bilateral internal and external carotid arteries.    Antegrade flow is present in both vertebral arteries.    Velocities expressed in centimeters per second are as follows:    RIGHT:  Proximal CCA = 95.7; Distal CCA = 98.3; Proximal ICA = 209.3; Distal ICA = 106.6;  ECA = 143.8  LEFT:    Proximal CCA = 97.0; Distal CCA = 115.9; Proximal ICA = 90.4; Distal ICA = 95.6;  ECA = 104.8    Right peak systolic IC/CC velocity ratio: 2.1  Left peak systolic IC/CC velocity ratio: 0.8    IMPRESSION:  1.  Right carotid system:  50-69% right ICA diameter stenosis.  2.  Left carotid system:  No hemodynamically significant stenosis is found.  3. CTA/MRA evaluation can be performed as clinically desired.            EMILIE URENA M.D., ATTENDING RADIOLOGIST  This document has been electronically signed. Oct 13 2020 11:59AM        Imaging  Reviewed:  YES    Consultant(s) Notes Reviewed:   YES      Plan of care was discussed with patient and /or primary care giver; all questions and concerns were addressed

## 2020-10-13 NOTE — PROGRESS NOTE ADULT - PROBLEM SELECTOR PLAN 3
Pt had PMH of syncope today, unwitnessed  Symptoms likely from anemia  Pt mentions being dizzy after he woke up from sleep and walked towards restroom  EKG - NSR   trops x2 negative   f/u Orthostatics  Carotid negative  f/u Echo Pt had PMH of syncope today, unwitnessed  Symptoms likely from anemia  Pt mentions being dizzy after he woke up from sleep and walked towards restroom  EKG - NSR   trops x2 negative   Orthostatics negative  Carotid negative  f/u Echo

## 2020-10-13 NOTE — CONSULT NOTE ADULT - SUBJECTIVE AND OBJECTIVE BOX
History of Present Illness:  Reason for Admission: SYNCOPE  History of Present Illness:   80M from home, self-ambulating with PMH of chronic anemia, DM, chronic suprapubic catheter, chronic leg swelling, comes to the ED after a syncopal episode today. Pt was in his usual state of health until 4 days ago when he first noted black tarry stool. He had a repeat episode the nest day and has been feeling weak and lethargic since then. He called his doctor friends in [place] who suggested him to visit ED. Today morning when he got off from his bed he was noted to be dizzy and then fell down. Pt doesn't remember distinctly but thinks he had LOC. Pt denies any chest pain, shortness of breath, palpitations, N/V, Abd pain, diarrhea or any NSAID or blood thinner use.   Pt stays alone at home. 8 months ago his wife was diagnosed with Brain cancer and is admitted in the [facility].   Pt denies any smoking, alcohol or any form of substance abuse.   In the ED,   Pt appears comfortable, no signs of any distress.   No active bleeding in ED   Vitals- 114/52, Hr 85, afebrile   CT abd- Cirrohtic liver with portal HTN, varices, concerns for SVT   Hb- 3.8  Pt received 1 unit of PRBC. PLan to give 2 more     Hem/OncHx: Pt states Dx with Melanoma 2014, treated with resection only. Prostate Cancer Dx 2015, s/p radiation, no further tx given and in remission. Pt states he has long hx of anemia, but no prior transfusions. Dx with cirrhosis 2017 pt states due to Hepatitis (type unknown) and follows with Hepatology at [facility], on ?lasix, paracentesis approx q 6 months.     REVIEW OF SYSTEMS  Constitutional:   No fever, + fatigue, + pallor, no night sweats, no weight loss.  HEENT:   No eye pain, no vision changes, no icterus, no mouth ulcers.  Respiratory:   No shortness of breath, no cough, no respiratory distress.   Cardiovascular:   No chest pain, no palpitations.   Gastrointestinal: +abdominal distention, No abdominal pain, no nausea, no vomiting , + diarrhea, no constipation, no hematochezia, + melena.  Skin:   No rashes, no jaundice, no eczema.   Musculoskeletal:   No joint pain, + LE swelling, no myalgia.   Neurologic:   No headache, no seizure, no weakness.   Genitourinary:   No dysuria, no decreased urine output.  Psychiatric:  No depression, no anxiety,   Endocrine:   No thyroid disease, no diabetes.  Heme/Lymphatic:   No anemia, no blood transfusions, no lymph node enlargement, no bleeding, no bruising.  ___________________________________________________________________________________________  Allergies: No Known Allergies    Home Medications:  Pt states he takes a water pill two days/week (prescribed by Hepatologist)    MEDICATIONS  (STANDING):  cefTRIAXone   IVPB      insulin lispro (HumaLOG) corrective regimen sliding scale   SubCutaneous three times a day before meals  pantoprazole  Injectable 40 milliGRAM(s) IV Push two times a day    Patient History:     PAST MEDICAL HISTORY:  Anemia   DM (diabetes mellitus)  cirrhosis  Melanoma  Prostate CA    Social History: Pt stays alone at home. 8 months ago his wife was diagnosed with Brain cancer and is admitted in the [facility]. Pt usually gets his food delivered  Denies ETOH    FamHx: denies any bleeding disorders or cancer diagnosis     Tobacco Screening:  · Core Measure Site	Yes  · Has the patient used tobacco in the past 30 days?	No    Risk Assessment:    Present on Admission:  Deep Venous Thrombosis	no  Pulmonary Embolus	no     Heart Failure:  Does this patient have a history of or has been diagnosed with heart failure? unknown.    Physical Exam    General:  awake, pallor, NAD.  HEENT:    Normal appearance of conjunctiva, ears, nose, lips, oropharynx, and oral mucosa, anicteric.  Neck:  No masses, no asymmetry.  Lymph Nodes:  No lymphadenopathy.   Cardiovascular:  S1/S2, no murmur.  Respiratory:  CTA B/L, normal respiratory effort.   Abdominal:   tense, distended, BS +, unable to assess for hepatosplenomegaly  Extremities:   B/L LE pitting edema L>R with LLE + tenderness  Skin:   No rash, jaundice    Neuro: No focal deficits.     Labs:  CBC Full  -  ( 13 Oct 2020 12:46 )  WBC Count : 5.34 K/uL  RBC Count : 2.91 M/uL  Hemoglobin : 8.1 g/dL  Hematocrit : 23.4 %  Platelet Count - Automated : 215 K/uL  Mean Cell Volume : 80.4 fl  Mean Cell Hemoglobin : 27.8 pg  Mean Cell Hemoglobin Concentration : 34.6 gm/dL  Auto Neutrophil # : x  Auto Lymphocyte # : x  Auto Monocyte # : x  Auto Eosinophil # : x  Auto Basophil # : x  Auto Neutrophil % : x  Auto Lymphocyte % : x  Auto Monocyte % : x  Auto Eosinophil % : x  Auto Basophil % : x    10-13    137  |  105  |  41<H>  ----------------------------<  92  4.3   |  25  |  1.43<H>    Ca    8.2<L>      13 Oct 2020 04:26  Phos  2.5     10-13  Mg     1.6     10-13    TPro  5.6<L>  /  Alb  2.2<L>  /  TBili  1.1  /  DBili  x   /  AST  24  /  ALT  18  /  AlkPhos  54  10-13    < from: CT Angio Abdomen and Pelvis w/ IV Cont (10.12.20 @ 18:15) >  EXAM:  CT ANGIO ABD PELV (W)AW IC                            PROCEDURE DATE:  10/12/2020          INTERPRETATION:  CLINICAL INFORMATION: GI bleeding. Abdominal pain.    COMPARISON: None.    PROCEDURE:  CT of the Abdomen and Pelvis was performed with and without intravenous contrast.  Precontrast, Arterial and Delayed phases were performed.  Intravenous contrast: 90 ml Omnipaque 350. 10 ml discarded.  Oral contrast: None.  Sagittal and coronal reformats were performed.    FINDINGS:  LOWER CHEST:Trace bilateral pleural effusion and trace pericardial effusion.    LIVER: Shrunken liver with nodularity likely indicating cirrhosis.  BILE DUCTS: Normal caliber.  GALLBLADDER: Multiple gallstones without evidence of cholecystitis.  SPLEEN: Within normal limits.  PANCREAS: Within normal limits.  ADRENALS: Within normal limits.  KIDNEYS/URETERS: Right extrarenal pelvis with mild hydroureteronephrosis tracing to the level of the junction of the mid and distal ureter. No radiopaque stone is identified.    BLADDER: Suprapubic catheter in place. Collapsed.  REPRODUCTIVE ORGANS: Surgical clips. Prostatectomy?    BOWEL: No bowel obstruction. Appendix is not visualized.  PERITONEUM: Massive ascites. No hemoperitoneum.  VESSELS: No active extravasation. Prominent portal vein and superior mesenteric vein with small sized splenic vein. Extensive collateral veins around the stomach, spleen and mesentery in the left mid abdomen (19-and 90). Moderate atherosclerotic disease.  RETROPERITONEUM/LYMPH NODES: No lymphadenopathy.  ABDOMINAL WALL: Mild anasarca.  BONES: Degenerative changes.    IMPRESSION:  No active extravasation.    Cirrhotic liver with stigmata of portal hypertension with extensive left gastric, splenic and mesenteric varices.    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    Mild right hydroureteronephrosis without demonstratable obstructing lesions.    Multiple gallstones.            SARAH GUAJARDO M.D., ATTENDING RADIOLOGIST  This document has been electronically signed. Oct 12 2020  6:51PM    < end of copied text >  < from: CT Chest No Cont (10.13.20 @ 09:27) >    EXAM:  CT CHEST                            *** ADDENDUM 10/13/2020  ***    NP Mr. Del Toro is informed.    *** END OF ADDENDUM 10/13/2020  ***      PROCEDURE DATE:  10/13/2020          INTERPRETATION:  Chest CT without IV contrast    Indication: Dyspnea.    Technique: Axial multidetector CT images of the chest are acquired without IV contrast.    Comparison: No prior chest CT is available for comparison. Reference is made with a previous abdominal CT dated 10/12/2020.    Findings: Mild bilateral pleural effusions. Mild pericardial effusion. The heart is not enlarged. Aortic, coronary artery, and mitral annular calcifications are present. Mild ectasia of the ascending aorta at 3.7 cm in diameter. Allowing for the noncontrast technique, there isno grossly enlarged mediastinal, hilar, or axillary lymph node.    The trachea and central bronchi are patent. Small linear densities in the trachea, likely representing mucus secretion.    No evidence of pneumothorax, pulmonary consolidation or mass. Small calcified granuloma in the right upper lobe. A few small noncalcified nonspecific lung nodules in the upper lobes bilaterally measuring up to 5 mm in the left upper lobe (image 55 series 3); if the patient's is in the high risk category, follow-up chest CT may be pursued in 12 months to ensure stability. Small bilateral atelectasis.    Limited sections through the upper abdomen again demonstrate nodular contour of the liver, suggestive of cirrhosis. Cholelithiasis. Large ascites again noted. IV contrast excretion in the left collecting system from recent contrast abdominal CT.    Ankylosing spondylitis. Apparent nondisplaced acute horizontal fracture at the inferior T5 vertebra with involvement of the inferior endplate (image 96 series7 and image 87 series 9).    Impression: Apparent nondisplaced acute horizontal fracture at the inferior T5 vertebra with involvement of the inferior endplate.    A few small lung nodules in the upper lobes bilaterally; if the patient's is in the high risk category (i.e. smoker), follow-up chest CT may be pursued in 12 months to ensure stability.    No evidence of pneumothorax, pulmonary consolidation or mass.    Small linear densities in the trachea, likely representing mucus secretion.    Mild bilateral pleural effusions.    Mild pericardial effusion.    ***Please see the addendum at the top of this report. It may contain additional important information or changes.****        ROSSI HOBSON M.D., ATTENDING RADIOLOGIST  This document has been electronically signed. Oct 13 2020 10:05AM  Addend:ROSSI HOBSON M.D., ATTENDING RADIOLOGIST  This addendum was electronically signed on: Oct 13 2020 11:20AM.    < end of copied text >     History of Present Illness:  Reason for Admission: SYNCOPE  History of Present Illness:   80M from home, self-ambulating with PMH of chronic anemia, DM, chronic suprapubic catheter, chronic leg swelling, comes to the ED after a syncopal episode today. Pt was in his usual state of health until 4 days ago when he first noted black tarry stool. He had a repeat episode the nest day and has been feeling weak and lethargic since then. He called his doctor friends in [place] who suggested him to visit ED. Today morning when he got off from his bed he was noted to be dizzy and then fell down. Pt doesn't remember distinctly but thinks he had LOC. Pt denies any chest pain, shortness of breath, palpitations, N/V, Abd pain, diarrhea or any NSAID or blood thinner use.   Pt stays alone at home. 8 months ago his wife was diagnosed with Brain cancer and is admitted in the [facility].   Pt denies any smoking, alcohol or any form of substance abuse.   In the ED,   Pt appears comfortable, no signs of any distress.   No active bleeding in ED   Vitals- 114/52, Hr 85, afebrile   CT abd- Cirrohtic liver with portal HTN, varices, concerns for SVT   Hb- 3.8  Pt received 1 unit of PRBC. PLan to give 2 more     Hem/OncHx: Pt states Dx with Melanoma 2014, treated with resection only. Prostate Cancer Dx 2015, s/p radiation, no further tx given and in remission. Pt states he has long hx of anemia, but no prior transfusions. Dx with cirrhosis 2017 pt states due to Hepatitis (type unknown) and follows with Hepatology at [facility], on ?lasix, paracentesis approx q 6 months.     REVIEW OF SYSTEMS  Constitutional:   No fever, + fatigue, + pallor, no night sweats, no weight loss.  HEENT:   No eye pain, no vision changes, no icterus, no mouth ulcers.  Respiratory:   No shortness of breath, no cough, no respiratory distress.   Cardiovascular:   No chest pain, no palpitations.   Gastrointestinal: +abdominal distention, No abdominal pain, no nausea, no vomiting , + diarrhea, no constipation, no hematochezia, + melena.  Skin:   No rashes, no jaundice, no eczema.   Musculoskeletal:   No joint pain, + LE swelling, no myalgia.   Neurologic:   No headache, no seizure, no weakness.   Genitourinary:   No dysuria, no decreased urine output.  Psychiatric:  No depression, no anxiety,   Endocrine:   No thyroid disease, no diabetes.  Heme/Lymphatic:   No anemia, no blood transfusions, no lymph node enlargement, no bleeding, no bruising.  ___________________________________________________________________________________________  Allergies: No Known Allergies    Home Medications:  Pt states he takes a water pill two days/week (prescribed by Hepatologist)    MEDICATIONS  (STANDING):  cefTRIAXone   IVPB      insulin lispro (HumaLOG) corrective regimen sliding scale   SubCutaneous three times a day before meals  pantoprazole  Injectable 40 milliGRAM(s) IV Push two times a day    Patient History:     PAST MEDICAL HISTORY:  Anemia   DM (diabetes mellitus)  cirrhosis  Melanoma  Prostate CA    Social History: Pt stays alone at home. 8 months ago his wife was diagnosed with Brain cancer and is admitted in the [facility]. Pt usually gets his food delivered  Denies ETOH    FamHx: denies any bleeding disorders or cancer diagnosis     Tobacco Screening:  · Core Measure Site	Yes  · Has the patient used tobacco in the past 30 days?	No    Risk Assessment:    Present on Admission:  Deep Venous Thrombosis	no  Pulmonary Embolus	no     Heart Failure:  Does this patient have a history of or has been diagnosed with heart failure? unknown.    Physical Exam    General:  awake, pallor, NAD.  HEENT:    Normal appearance of conjunctiva, ears, nose, lips, oropharynx, and oral mucosa, anicteric.  Neck:  No masses, no asymmetry.  Lymph Nodes:  No lymphadenopathy.   Cardiovascular:  S1/S2, no murmur.  Respiratory:  CTA B/L, normal respiratory effort.   Abdominal:   tense, distended, BS +, unable to assess for hepatosplenomegaly  Extremities:   B/L LE pitting edema L>R with LLE + tenderness  Skin:   No rash, jaundice    Neuro: No focal deficits.     Labs:  CBC Full  -  ( 13 Oct 2020 12:46 )  WBC Count : 5.34 K/uL  RBC Count : 2.91 M/uL  Hemoglobin : 8.1 g/dL  Hematocrit : 23.4 %  Platelet Count - Automated : 215 K/uL  Mean Cell Volume : 80.4 fl  Mean Cell Hemoglobin : 27.8 pg  Mean Cell Hemoglobin Concentration : 34.6 gm/dL  Auto Neutrophil # : x  Auto Lymphocyte # : x  Auto Monocyte # : x  Auto Eosinophil # : x  Auto Basophil # : x  Auto Neutrophil % : x  Auto Lymphocyte % : x  Auto Monocyte % : x  Auto Eosinophil % : x  Auto Basophil % : x    10-13    137  |  105  |  41<H>  ----------------------------<  92  4.3   |  25  |  1.43<H>    Ca    8.2<L>      13 Oct 2020 04:26  Phos  2.5     10-13  Mg     1.6     10-13    TPro  5.6<L>  /  Alb  2.2<L>  /  TBili  1.1  /  DBili  x   /  AST  24  /  ALT  18  /  AlkPhos  54  10-13    < from: CT Angio Abdomen and Pelvis w/ IV Cont (10.12.20 @ 18:15) >  EXAM:  CT ANGIO ABD PELV (W)AW IC                            PROCEDURE DATE:  10/12/2020          INTERPRETATION:  CLINICAL INFORMATION: GI bleeding. Abdominal pain.    COMPARISON: None.    PROCEDURE:  CT of the Abdomen and Pelvis was performed with and without intravenous contrast.  Precontrast, Arterial and Delayed phases were performed.  Intravenous contrast: 90 ml Omnipaque 350. 10 ml discarded.  Oral contrast: None.  Sagittal and coronal reformats were performed.    FINDINGS:  LOWER CHEST:Trace bilateral pleural effusion and trace pericardial effusion.    LIVER: Shrunken liver with nodularity likely indicating cirrhosis.  BILE DUCTS: Normal caliber.  GALLBLADDER: Multiple gallstones without evidence of cholecystitis.  SPLEEN: Within normal limits.  PANCREAS: Within normal limits.  ADRENALS: Within normal limits.  KIDNEYS/URETERS: Right extrarenal pelvis with mild hydroureteronephrosis tracing to the level of the junction of the mid and distal ureter. No radiopaque stone is identified.    BLADDER: Suprapubic catheter in place. Collapsed.  REPRODUCTIVE ORGANS: Surgical clips. Prostatectomy?    BOWEL: No bowel obstruction. Appendix is not visualized.  PERITONEUM: Massive ascites. No hemoperitoneum.  VESSELS: No active extravasation. Prominent portal vein and superior mesenteric vein with small sized splenic vein. Extensive collateral veins around the stomach, spleen and mesentery in the left mid abdomen (19-and 90). Moderate atherosclerotic disease.  RETROPERITONEUM/LYMPH NODES: No lymphadenopathy.  ABDOMINAL WALL: Mild anasarca.  BONES: Degenerative changes.    IMPRESSION:  No active extravasation.    Cirrhotic liver with stigmata of portal hypertension with extensive left gastric, splenic and mesenteric varices.    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    Mild right hydroureteronephrosis without demonstratable obstructing lesions.    Multiple gallstones.            SARAH GUAJARDO M.D., ATTENDING RADIOLOGIST  This document has been electronically signed. Oct 12 2020  6:51PM    < end of copied text >  < from: CT Chest No Cont (10.13.20 @ 09:27) >    EXAM:  CT CHEST                            *** ADDENDUM 10/13/2020  ***    NP Mr. Del Toro is informed.    *** END OF ADDENDUM 10/13/2020  ***      PROCEDURE DATE:  10/13/2020          INTERPRETATION:  Chest CT without IV contrast    Indication: Dyspnea.    Technique: Axial multidetector CT images of the chest are acquired without IV contrast.    Comparison: No prior chest CT is available for comparison. Reference is made with a previous abdominal CT dated 10/12/2020.    Findings: Mild bilateral pleural effusions. Mild pericardial effusion. The heart is not enlarged. Aortic, coronary artery, and mitral annular calcifications are present. Mild ectasia of the ascending aorta at 3.7 cm in diameter. Allowing for the noncontrast technique, there isno grossly enlarged mediastinal, hilar, or axillary lymph node.    The trachea and central bronchi are patent. Small linear densities in the trachea, likely representing mucus secretion.    No evidence of pneumothorax, pulmonary consolidation or mass. Small calcified granuloma in the right upper lobe. A few small noncalcified nonspecific lung nodules in the upper lobes bilaterally measuring up to 5 mm in the left upper lobe (image 55 series 3); if the patient's is in the high risk category, follow-up chest CT may be pursued in 12 months to ensure stability. Small bilateral atelectasis.    Limited sections through the upper abdomen again demonstrate nodular contour of the liver, suggestive of cirrhosis. Cholelithiasis. Large ascites again noted. IV contrast excretion in the left collecting system from recent contrast abdominal CT.    Ankylosing spondylitis. Apparent nondisplaced acute horizontal fracture at the inferior T5 vertebra with involvement of the inferior endplate (image 96 series7 and image 87 series 9).    Impression: Apparent nondisplaced acute horizontal fracture at the inferior T5 vertebra with involvement of the inferior endplate.    A few small lung nodules in the upper lobes bilaterally; if the patient's is in the high risk category (i.e. smoker), follow-up chest CT may be pursued in 12 months to ensure stability.    No evidence of pneumothorax, pulmonary consolidation or mass.    Small linear densities in the trachea, likely representing mucus secretion.    Mild bilateral pleural effusions.    Mild pericardial effusion.    ***Please see the addendum at the top of this report. It may contain additional important information or changes.****        ROSSI HOBSON M.D., ATTENDING RADIOLOGIST  This document has been electronically signed. Oct 13 2020 10:05AM  Addend:ROSSI HOBSON M.D., ATTENDING RADIOLOGIST  This addendum was electronically signed on: Oct 13 2020 11:20AM.    < end of copied text >    < from: US Duplex Venous Lower Ext Ltd, Left (10.13.20 @ 16:17) >  EXAM:  US DPLX LWR EXT VEINS LTD LT                            PROCEDURE DATE:  10/13/2020          INTERPRETATION:  CLINICAL INFORMATION: Left lower extremity swelling    COMPARISON: None available.    TECHNIQUE: Duplex sonography of the LEFT LOWER extremity veins with color and spectral Doppler, with and without compression. Note that the left lung gastrocnemius veins are not visualized.    FINDINGS:    There is normal compressibility of the left common femoral, femoral and popliteal veins.  Doppler examination shows normal spontaneous and phasic flow.    No calf vein thrombosis is detected. Calf edema is present.    IMPRESSION:  No evidence of left lower extremity deep venous thrombosis.   Please note, there is limited visualization of the left calf veins due to calf edema.  Therefore, if  clinical concern persists, reevaluation can be performed after 5 to 7 days to evaluate for propagation of clot.                KATRIN WHITE M.D., ATTENDING RADIOLOGIST  This document has been electronically signed. Oct 13 2020  4:33PM    < end of copied text >

## 2020-10-13 NOTE — CONSULT NOTE ADULT - PROBLEM SELECTOR RECOMMENDATION 9
Likely a chronic GI bleed (upper verus lower). Unlikely to be a brisk UGI as he would be more unstable  with a hemoglobin of 3   Suggest monitoring CBC (keep hemoglobin between 7-8 do not over transfuse   IV PPi drip BID   Octreotide IV   CBC Q 6-8 hours   Clear liquid diet   will plan for EGD and coloscopy on Thursday (please have cardio see patient to optimize him) Likely a chronic GI bleed (upper verus lower). Unlikely to be a brisk UGI as he would be more unstable  with a hemoglobin of 3   Suggest monitoring CBC (keep hemoglobin between 7-8 do not over transfuse   IV PPi drip BID   Octreotide IV   CBC Q 6-8 hours   Clear liquid diet   IV anbx   will plan for EGD and coloscopy on Thursday (please have cardio see patient to optimize him)

## 2020-10-13 NOTE — PROGRESS NOTE ADULT - PROBLEM SELECTOR PLAN 4
Patient has PMH of liver cirrhosis  CT- Cirrhotic liver with portal HTN, varices, concerns for SVT  oncology [facility] onboard  GI dr. Alarcon onboard  EGD on Thursday  f/u recommendation

## 2020-10-13 NOTE — PROGRESS NOTE ADULT - PROBLEM SELECTOR PLAN 6
Problem: Prophylactic measure.  Plan: IMPROVE VTE Individual Risk Assessment    RISK                                                          Points  [] Previous VTE                                           3  [] Thrombophilia                                        2  [] Lower limb paralysis                              2   [] Current Cancer                                       2   [x] Immobilization > 24 hrs                        1  [] ICU/CCU stay > 24 hours                       1  [x Age > 60                                                   1  IMPROVE VTE Score: 2  No dvt ppx for gi bleeding, PT on SCD Pt has PMH of DM   Home meds Metformin 1000mg   full liquid diet  Sliding scale as per protocol  monitor FS Pt has PMH of DM   Home meds Metformin 1000mg   full liquid diet  Humalog sliding scale as per protocol  monitor FS

## 2020-10-13 NOTE — CONSULT NOTE ADULT - ASSESSMENT
80M from home, self-ambulating with PMH of chronic anemia, DM, chronic suprapubic catheter, chronic leg swelling, comes to the ED after a syncopal episode today. Pt was in his usual state of health until 4 days ago when he first noted black tarry stool. He had a repeat episode the nest day and has been feeling weak and lethargic since then. He called his doctor friends in [place] who suggested him to visit ED. Today morning when he got off from his bed he was noted to be dizzy and then fell down. Pt doesn't remember distinctly but thinks he had LOC. Pt denies any chest pain, shortness of breath, palpitations, N/V, Abd pain, diarrhea or any NSAID or blood thinner use.   Pt stays alone at home. 8 months ago his wife was diagnosed with Brain cancer and is admitted in the [facility].   Pt denies any smoking, alcohol or any form of substance abuse.     CT abd- Cirrohtic liver with portal HTN, varices, concerns for SVT     Hem/OncHx: Pt states Dx with Melanoma 2014, treated with resection only. Prostate Cancer Dx 2015, s/p radiation, no further tx given and in remission. Pt states he has long hx of anemia, but no prior transfusions. Dx with cirrhosis 2017 due to Hepatitis and follows with Hepatology at [facility], on ?lasix, paracentesis approx q 6 months.     Problem# Anemia  p/w syncope, melena x 4 days, weakness, fatigue and Hgb=3.9  s/p 3 units PRBC and now Hgb=7.1  Acute GIB    CTA A/P- Cirrhotic liver with stigmata of portal hypertension with extensive left gastric, splenic and mesenteric varices. 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  Chest CT: nondisplaced acute horizontal fracture at the inferior T5 vertebra with involvement of the inferior endplate. A few small lung nodules in the upper lobes bilaterally; if the patient's is in the high risk category (i.e. smoker), follow-up chest CT may be pursued in 12 months to ensure stability. B/L mild Pl Ef and mild pericardial effusion  hepatitis panel (-)  abdominal distention with known cirrhosis  f/u ct chest to r/o any underlying malignancy   appreciate GI consult, plan for EGD/colonoscopy on 10/15  Rec's:  CBC q8   Venofer 200mg QD x 4 days  doppler LE r/o DVT  Abd US evaluate ascites, possible paracentesis  Transfuse PRBC if Hgb <7.0 or if symptomatic  await GI work-up    Problem# Syncope  EKG - NSR   trops x1 negative   Carotid and Echo ordered  mgmt as per Medicine and Cardiology Teams    Problem# VTE/GI Prophylaxis   Acute GIB, continue SCD  Continue PPI      Thank you for the consult. Will continue to monitor the patient.  Please call with any questions [phone]  Above reviewed with Attending Dr. Hinojosa     80M from home, self-ambulating with PMH of chronic anemia, DM, chronic suprapubic catheter, chronic leg swelling, comes to the ED after a syncopal episode today. Pt was in his usual state of health until 4 days ago when he first noted black tarry stool. He had a repeat episode the nest day and has been feeling weak and lethargic since then. He called his doctor friends in [place] who suggested him to visit ED. Today morning when he got off from his bed he was noted to be dizzy and then fell down. Pt doesn't remember distinctly but thinks he had LOC. Pt denies any chest pain, shortness of breath, palpitations, N/V, Abd pain, diarrhea or any NSAID or blood thinner use.   Pt stays alone at home. 8 months ago his wife was diagnosed with Brain cancer and is admitted in the [facility].   Pt denies any smoking, alcohol or any form of substance abuse.     CT abd- Cirrohtic liver with portal HTN, varices, concerns for SVT     Hem/OncHx: Pt states Dx with Melanoma 2014, treated with resection only. Prostate Cancer Dx 2015, s/p radiation, no further tx given and in remission. Pt states he has long hx of anemia, but no prior transfusions. Dx with cirrhosis 2017 due to Hepatitis and follows with Hepatology at [facility], on ?lasix, paracentesis approx q 6 months.     Problem# Anemia  p/w syncope, melena x 4 days, weakness, fatigue and Hgb=3.9  s/p 3 units PRBC and now Hgb=7.1  Etiology: Acute GIB/FRANKLIN/Hypothyroidism/KENNY/Cirrhosis  CTA A/P- Cirrhotic liver with stigmata of portal hypertension with extensive left gastric, splenic and mesenteric varices. 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  Chest CT: nondisplaced acute horizontal fracture at the inferior T5 vertebra with involvement of the inferior endplate. A few small lung nodules in the upper lobes bilaterally; if the patient's is in the high risk category (i.e. smoker), follow-up chest CT may be pursued in 12 months to ensure stability. B/L mild Pl Ef and mild pericardial effusion  hepatitis panel (-)  B12/folate nl  Ferritin=13, Iron Sat 4%  TSH=6.5, Cr=1.4  abdominal distention with known cirrhosis  f/u ct chest to r/o any underlying malignancy   appreciate GI consult, plan for EGD/colonoscopy on 10/15  B/L LE edema L>R, doppler LLE (-) but limited visualization of veins due to edema, monitor  Rec's:  -CBC q8   -Venofer 200mg QD x 4 days  -Abd US evaluate ascites, possible paracentesis  -Transfuse PRBC if Hgb <7.0 or if symptomatic  -await GI work-up    Problem# Syncope  EKG - NSR   trops x1 negative   Carotid and Echo ordered  mgmt as per Medicine and Cardiology Teams    Problem# VTE/GI Prophylaxis   Acute GIB, continue SCD  Continue PPI    Thank you for the consult. Will continue to monitor the patient.  Please call with any questions [phone]  Above reviewed with Attending Dr. Hinojosa     80M from home, self-ambulating with PMH of chronic anemia, DM, chronic suprapubic catheter, chronic leg swelling, comes to the ED after a syncopal episode today. Pt was in his usual state of health until 4 days ago when he first noted black tarry stool. He had a repeat episode the nest day and has been feeling weak and lethargic since then. He called his doctor friends in [place] who suggested him to visit ED. Today morning when he got off from his bed he was noted to be dizzy and then fell down. Pt doesn't remember distinctly but thinks he had LOC. Pt denies any chest pain, shortness of breath, palpitations, N/V, Abd pain, diarrhea or any NSAID or blood thinner use.   Pt stays alone at home. 8 months ago his wife was diagnosed with Brain cancer and is admitted in the [facility].   Pt denies any smoking, alcohol or any form of substance abuse.     CT abd- Cirrohtic liver with portal HTN, varices, concerns for SVT     Hem/OncHx: Pt states Dx with Melanoma 2014, treated with resection only. Prostate Cancer Dx 2015, s/p radiation, no further tx given and in remission. Pt states he has long hx of anemia, but no prior transfusions. Dx with cirrhosis 2017 due to Hepatitis and follows with Hepatology at [facility], on ?lasix, paracentesis approx q 6 months.     Problem# Anemia  p/w syncope, melena x 4 days, weakness, fatigue and Hgb=3.9  s/p 3 units PRBC post H/H Hgb=7.1 and now 8.1 without addt'l transfusion  Etiology: Acute GIB/FRANKLIN/Hypothyroidism/KENNY/Cirrhosis  CTA A/P- Cirrhotic liver with stigmata of portal hypertension with extensive left gastric, splenic and mesenteric varices. 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  Chest CT: nondisplaced acute horizontal fracture at the inferior T5 vertebra with involvement of the inferior endplate. A few small lung nodules in the upper lobes bilaterally; if the patient's is in the high risk category (i.e. smoker), follow-up chest CT may be pursued in 12 months to ensure stability. B/L mild Pl Ef and mild pericardial effusion  hepatitis panel (-)  B12/folate nl  Ferritin=13, Iron Sat 4%  TSH=6.5, Cr=1.4  abdominal distention with known cirrhosis  f/u ct chest to r/o any underlying malignancy   appreciate GI consult, plan for EGD/colonoscopy on 10/15  B/L LE edema L>R, doppler LLE (-) but limited visualization of veins due to edema, monitor  Rec's:  -CBC q8   -Venofer 200mg QD x 4 days  -Abd US evaluate ascites, possible paracentesis  -Transfuse PRBC if Hgb <7.0 or if symptomatic  -await GI work-up    Problem# Syncope  EKG - NSR   trops x1 negative   Carotid and Echo ordered  mgmt as per Medicine and Cardiology Teams    Problem# VTE/GI Prophylaxis   Acute GIB, continue SCD  Continue PPI    Thank you for the consult. Will continue to monitor the patient.  Please call with any questions [phone]  Above reviewed with Attending Dr. Hinojosa     80M from home, self-ambulating with PMH of chronic anemia, DM, chronic suprapubic catheter, chronic leg swelling, comes to the ED after a syncopal episode today. Pt was in his usual state of health until 4 days ago when he first noted black tarry stool. He had a repeat episode the nest day and has been feeling weak and lethargic since then. He called his doctor friends in [place] who suggested him to visit ED. Today morning when he got off from his bed he was noted to be dizzy and then fell down. Pt doesn't remember distinctly but thinks he had LOC. Pt denies any chest pain, shortness of breath, palpitations, N/V, Abd pain, diarrhea or any NSAID or blood thinner use.   Pt stays alone at home. 8 months ago his wife was diagnosed with Brain cancer and is admitted in the [facility].   Pt denies any smoking, alcohol or any form of substance abuse.     CT abd- Cirrohtic liver with portal HTN, varices, concerns for SVT     Hem/OncHx: Pt states Dx with Melanoma 2014, treated with resection only. Prostate Cancer Dx 2015, s/p radiation, no further tx given and in remission. Pt states he has long hx of anemia, but no prior transfusions. Dx with cirrhosis 2017 due to Hepatitis and follows with Hepatology at [facility], on ?lasix, paracentesis approx q 6 months.     Problem# Anemia  p/w syncope, melena x 4 days, weakness, fatigue and Hgb=3.9  s/p 3 units PRBC post H/H Hgb=7.1 and now 8.1 without addt'l transfusion  Etiology: Acute GIB/FRANKLIN/Hypothyroidism/KENNY/Cirrhosis  CTA A/P- Cirrhotic liver with stigmata of portal hypertension with extensive left gastric, splenic and mesenteric varices. 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  Chest CT: nondisplaced acute horizontal fracture at the inferior T5 vertebra with involvement of the inferior endplate. A few small lung nodules in the upper lobes bilaterally; if the patient's is in the high risk category (i.e. smoker), follow-up chest CT may be pursued in 12 months to ensure stability. B/L mild Pl Ef and mild pericardial effusion  hepatitis panel (-)  B12/folate nl  Ferritin=13, Iron Sat 4%  TSH=6.5, Cr=1.4  abdominal distention with known cirrhosis  f/u ct chest to r/o any underlying malignancy   appreciate GI consult, plan for EGD/colonoscopy on 10/15  B/L LE edema L>R, doppler LLE (-) but limited visualization of veins due to edema, monitor  Rec's:  -CBC q8   -Venofer 200mg QD x 4 days  -Abd US evaluate ascites, possible paracentesis  -Transfuse PRBC if Hgb <7.0 or if symptomatic  -await GI work-up, plan for scopes  -IV PPI     Problem# Syncope  EKG - NSR   trops x1 negative   Carotid and Echo ordered  mgmt as per Medicine and Cardiology Teams    Problem# VTE/GI Prophylaxis   Acute GIB, continue SCD  Continue PPI    Thank you for the consult. Will continue to monitor the patient.  Please call with any questions [phone]  Above reviewed with Attending Dr. Hinojosa

## 2020-10-13 NOTE — CONSULT NOTE ADULT - SUBJECTIVE AND OBJECTIVE BOX
[facility]   MD FIDELINA LEDESMA MD RUORU WONG, PA    TEL:  OFFICE: [phone]  DR CHAMBERS CELL: [phone]  RAMIRO HERNANDEZ CELL: [phone]  DR. THOMAS CELL: [phone]  DR. CRABTREE CELl: [phone]    FROM 5 PM- 7 AM PLEASE CALL ANSWERING SERVICE AT [phone]    -- INITIAL RENAL CONSULT NOTE --- Date Of service 10-13-20 @ 08:44  --------------------------------------------------------------------------------  HPI:  80M from home, self-ambulating with PMH of chronic anemia, DM, chronic suprapubic catheter, chronic leg swelling, comes to the ED after a syncopal episode   nephrology consulted for KENNY      PAST HISTORY  --------------------------------------------------------------------------------  PAST MEDICAL & SURGICAL HISTORY:  Anemia    DM (diabetes mellitus)      FAMILY HISTORY:    PAST SOCIAL HISTORY:    ALLERGIES & MEDICATIONS  --------------------------------------------------------------------------------  Allergies    No Known Allergies    Intolerances      Standing Inpatient Medications  cefTRIAXone   IVPB      cefTRIAXone   IVPB 1000 milliGRAM(s) IV Intermittent once  insulin lispro (HumaLOG) corrective regimen sliding scale   SubCutaneous three times a day before meals  pantoprazole  Injectable 40 milliGRAM(s) IV Push two times a day    PRN Inpatient Medications      REVIEW OF SYSTEMS  --------------------------------------------------------------------------------  Gen: No fevers/chills  Skin: No rashes  Head/Eyes/Ears: Normal hearing,  Normal vision   Respiratory: No dyspnea, cough  CV: No chest pain  GI: No abdominal pain, diarrhea, constipation, nausea, vomiting  : No dysuria, hematuria  MSK: No  edema  Heme: No easy bruising or bleeding  Psych: No significant depression    All other systems were reviewed and are negative, except as noted.    VITALS/PHYSICAL EXAM  --------------------------------------------------------------------------------  T(C): 36.8 (10-13-20 @ 04:07), Max: 37.1 (10-13-20 @ 01:15)  HR: 80 (10-13-20 @ 04:07) (80 - 96)  BP: 146/67 (10-13-20 @ 04:07) (110/65 - 146/67)  RR: 17 (10-13-20 @ 04:07) (16 - 18)  SpO2: 98% (10-13-20 @ 04:07) (98% - 100%)  Wt(kg): --  Height (cm): 182.9 (10-12-20 @ 15:51)  Weight (kg): 77.1 (10-12-20 @ 15:51)  BMI (kg/m2): 23 (10-12-20 @ 15:51)  BSA (m2): 1.99 (10-12-20 @ 15:51)      10-12-20 @ 07:01  -  10-13-20 @ 07:00  --------------------------------------------------------  IN: 350 mL / OUT: 1900 mL / NET: -1550 mL      Physical Exam:  	Gen: NAD  	HEENT: MMM  	Pulm: CTA B/L  	CV: S1S2  	Abd: Soft, +BS   	Ext: No LE edema B/L  	Neuro: Awake, alert  	Skin: Warm and dry  	Vascular access:          : mahnaz  LABS/STUDIES  --------------------------------------------------------------------------------              7.1    4.78  >-----------<  216      [10-13-20 @ 04:26]              21.0     137  |  105  |  41  ----------------------------<  92      [10-13-20 @ 04:26]  4.3   |  25  |  1.43        Ca     8.2     [10-13-20 @ 04:26]      Mg     1.6     [10-13-20 @ 04:26]      Phos  2.5     [10-13-20 @ 04:26]    TPro  5.6  /  Alb  2.2  /  TBili  1.1  /  DBili  x   /  AST  24  /  ALT  18  /  AlkPhos  54  [10-13-20 @ 04:26]    PT/INR: PT 13.9 , INR 1.18       [10-13-20 @ 04:26]  PTT: 22.7       [10-13-20 @ 04:26]    Troponin <0.015      [10-13-20 @ 04:26]    Creatinine Trend:  SCr 1.43 [10-13 @ 04:26]  SCr 1.84 [10-12 @ 16:50]    Urinalysis - [10-12-20 @ 17:24]      Color Yellow / Appearance Slightly Turbid / SG 1.020 / pH 5.0      Gluc Negative / Ketone Negative  / Bili Negative / Urobili Negative       Blood Moderate / Protein 100 / Leuk Est Moderate / Nitrite Negative      RBC 2-5 / WBC 26-50 / Hyaline  / Gran  / Sq Epi  / Non Sq Epi Occasional / Bacteria Few      Iron 11, TIBC 316, %sat 4      [10-12-20 @ 16:50]  Ferritin 13      [10-12-20 @ 23:27]  TSH 6.58      [10-13-20 @ 04:26]  Lipid: chol 110, TG 89, HDL 25, LDL 67      [10-13-20 @ 04:26]       [facility] [place] [facility]   MD FIDELINA LEDESMA MD RUORU WONG, PA    TEL:  OFFICE: [phone]  DR CHAMBERS CELL: [phone]  RAMIRO HERNANDEZ CELL: [phone]  DR. THOMAS CELL: [phone]  DR. CRABTREE CELl: [phone]    FROM 5 PM- 7 AM PLEASE CALL ANSWERING SERVICE AT [phone]    -- INITIAL RENAL CONSULT NOTE --- Date Of service 10-13-20 @ 08:44  --------------------------------------------------------------------------------  HPI:  80M from home, self-ambulating with PMH of chronic anemia, DM, chronic suprapubic catheter, chronic leg swelling, comes to the ED after a syncopal episode   nephrology consulted for KENNY      PAST HISTORY  --------------------------------------------------------------------------------  PAST MEDICAL & SURGICAL HISTORY:  Anemia    DM (diabetes mellitus)      FAMILY HISTORY:    PAST SOCIAL HISTORY:    ALLERGIES & MEDICATIONS  --------------------------------------------------------------------------------  Allergies    No Known Allergies    Intolerances      Standing Inpatient Medications  cefTRIAXone   IVPB      cefTRIAXone   IVPB 1000 milliGRAM(s) IV Intermittent once  insulin lispro (HumaLOG) corrective regimen sliding scale   SubCutaneous three times a day before meals  pantoprazole  Injectable 40 milliGRAM(s) IV Push two times a day    PRN Inpatient Medications      REVIEW OF SYSTEMS  --------------------------------------------------------------------------------  Gen: No fevers/chills  Skin: No rashes  Head/Eyes/Ears: Normal hearing,  Normal vision   Respiratory: No dyspnea, cough  CV: No chest pain  GI: No abdominal pain, diarrhea, constipation, nausea, vomiting  : + supra pubic catheter  MSK: + edema  Heme: No easy bruising or bleeding  Psych: No significant depression    All other systems were reviewed and are negative, except as noted.    VITALS/PHYSICAL EXAM  --------------------------------------------------------------------------------  T(C): 36.8 (10-13-20 @ 04:07), Max: 37.1 (10-13-20 @ 01:15)  HR: 80 (10-13-20 @ 04:07) (80 - 96)  BP: 146/67 (10-13-20 @ 04:07) (110/65 - 146/67)  RR: 17 (10-13-20 @ 04:07) (16 - 18)  SpO2: 98% (10-13-20 @ 04:07) (98% - 100%)  Wt(kg): --  Height (cm): 182.9 (10-12-20 @ 15:51)  Weight (kg): 77.1 (10-12-20 @ 15:51)  BMI (kg/m2): 23 (10-12-20 @ 15:51)  BSA (m2): 1.99 (10-12-20 @ 15:51)      10-12-20 @ 07:01  -  10-13-20 @ 07:00  --------------------------------------------------------  IN: 350 mL / OUT: 1900 mL / NET: -1550 mL      Physical Exam:  	Gen: NAD  	HEENT: MMM  	Pulm: CTA B/L  	CV: S1S2  	Abd: Soft, +BS   	Ext: + LE edema B/L  	Neuro: Awake, alert  	Skin: Warm and dry  	Vascular access:          : supra pubic catheter   LABS/STUDIES  --------------------------------------------------------------------------------              7.1    4.78  >-----------<  216      [10-13-20 @ 04:26]              21.0     137  |  105  |  41  ----------------------------<  92      [10-13-20 @ 04:26]  4.3   |  25  |  1.43        Ca     8.2     [10-13-20 @ 04:26]      Mg     1.6     [10-13-20 @ 04:26]      Phos  2.5     [10-13-20 @ 04:26]    TPro  5.6  /  Alb  2.2  /  TBili  1.1  /  DBili  x   /  AST  24  /  ALT  18  /  AlkPhos  54  [10-13-20 @ 04:26]    PT/INR: PT 13.9 , INR 1.18       [10-13-20 @ 04:26]  PTT: 22.7       [10-13-20 @ 04:26]    Troponin <0.015      [10-13-20 @ 04:26]    Creatinine Trend:  SCr 1.43 [10-13 @ 04:26]  SCr 1.84 [10-12 @ 16:50]    Urinalysis - [10-12-20 @ 17:24]      Color Yellow / Appearance Slightly Turbid / SG 1.020 / pH 5.0      Gluc Negative / Ketone Negative  / Bili Negative / Urobili Negative       Blood Moderate / Protein 100 / Leuk Est Moderate / Nitrite Negative      RBC 2-5 / WBC 26-50 / Hyaline  / Gran  / Sq Epi  / Non Sq Epi Occasional / Bacteria Few      Iron 11, TIBC 316, %sat 4      [10-12-20 @ 16:50]  Ferritin 13      [10-12-20 @ 23:27]  TSH 6.58      [10-13-20 @ 04:26]  Lipid: chol 110, TG 89, HDL 25, LDL 67      [10-13-20 @ 04:26]

## 2020-10-13 NOTE — CONSULT NOTE ADULT - ASSESSMENT
80M from home, self-ambulating with PMH of chronic anemia, DM, chronic suprapubic catheter, chronic leg swelling, comes to the ED after a syncopal episode   nephrology consulted for IAN      Ian  scr 1.8 on admission   Ian likely sec to severe anemia (Hb ~3)  s/p 3 units pRBC   Renal function improving today  s/p Ct A/P with contrast on 10/12-- Monitor for JACK  Monitor at present  Avoid further nephrotoxics, NSAIDs RCa    Proteinuria/ hematuria  in setting of UTI  Repeat UA after treatment    Right hydroureteronephrosis  Ct A/P with right hydroureteronephrosis  Recommend Urology evaluation 80M from home, self-ambulating with PMH of chronic anemia, DM, chronic suprapubic catheter, chronic leg swelling, comes to the ED after a syncopal episode   nephrology consulted for IAN      Ian  scr 1.8 on admission   Ian likely sec to severe anemia (Hb ~3)  s/p 3 units pRBC   Renal function improving today  s/p Ct A/P with contrast on 10/12-- Monitor for JACK  Monitor at present  Avoid further nephrotoxics, NSAIDs RCa    Proteinuria/ hematuria  in setting of UTI  Repeat UA after treatment    Right hydroureteronephrosis  Ct A/P with right hydroureteronephrosis  Recommend Urology evaluation    LE edema  etiology?  Consider lasix

## 2020-10-13 NOTE — PROGRESS NOTE ADULT - PROBLEM SELECTOR PLAN 1
Pt admitted for syncopal episode, most likely 2/2 gi bleeding  Pt had justin x 4 days prior to admission, no active bleeding in hospital  No prior episodes before.  CT abd- Cirrhotic liver with portal HTN, varices, concerns for SVT   GI dr. Alarcon  EGD and colonoscopy on Thursday  Hb on admission 3.8   s/p 3U prbc (H&H 8.1/23.4)  Venofer 200mg IVPB daily x 4 days  Octreotide 500ug daily x 5 days  Will f/u CBC q 6-8h  hepatitis panel negative  ct chest  nondisplaced acute horizontal fracture at the inferior T5   ortho consulted, no intervention at this time  Hematology- [facility]  f/u CBC q 6 Pt admitted for syncopal episode, most likely 2/2 gi bleeding  Pt had justin x 4 days prior to admission, no active bleeding in hospital  No prior episodes before.  CT abd- Cirrhotic liver with portal HTN, varices, concerns for SVT   GI dr. Alarcon  EGD and colonoscopy on Thursday  Hb on admission 3.8   s/p 3U prbc (H&H 8.1/23.4)  Venofer 200mg IVPB daily x 4 days  Octreotide 500ug daily x 5 days  Will f/u CBC q 6-8h  hepatitis panel negative  ct chest  nondisplaced acute horizontal fracture at the inferior T5   ortho consulted, no intervention at this time  Hematology- [facility]

## 2020-10-13 NOTE — PROGRESS NOTE ADULT - PROBLEM SELECTOR PLAN 7
Patient A&O x 3 capable of making decisions  Full code Problem: Prophylactic measure.  Plan: IMPROVE VTE Individual Risk Assessment    RISK                                                          Points  [] Previous VTE                                           3  [] Thrombophilia                                        2  [] Lower limb paralysis                              2   [] Current Cancer                                       2   [x] Immobilization > 24 hrs                        1  [] ICU/CCU stay > 24 hours                       1  [x Age > 60                                                   1  IMPROVE VTE Score: 2  No dvt ppx for gi bleeding, PT on SCD

## 2020-10-13 NOTE — CONSULT NOTE ADULT - PROBLEM SELECTOR RECOMMENDATION 3
Chronic liver disease workup: Hep B, C , GEO, SMA, AMA, iron studies (done already)   CT showing no evidence of HCC

## 2020-10-13 NOTE — CONSULT NOTE ADULT - ATTENDING COMMENTS
I have reviewed patient's data and participated in the management of the patient along with Naida RAMOS as well as hematology/med oncology faculty during the daily heme/onc case review. I reviewed pertinent clinical information, PE,  labs as well as A/P as outline above, in agreement and edited as appropriate.

## 2020-10-13 NOTE — CONSULT NOTE ADULT - SUBJECTIVE AND OBJECTIVE BOX
Patient is a 80y old  Male who presents with a chief complaint of SYNCOPY (13 Oct 2020 08:44)     .     HPI:    HPI:  80M from home, self-ambulating with PMH of chronic anemia, DM, chronic suprapubic catheter, chronic leg swelling, comes to the ED after a syncopal episode today. Pt was in his usual state of health until 4 days ago when he first noted black tarry stool. He had a repeat episode the nest day and has been feeling weak and lethargic since then. He called his doctor friends in [place] who suggested him to visit ED. Today morning when he got off from his bed he was noted to be dizzy and then fell down. Pt doesn't remember distinctly but thinks he had LOC. Pt denies any chest pain, shortness of breath, palpitations, N/V, Abd pain, diarrhea or any NSAID or blood thinner use.   Pt stays alone at home. 8 months ago his wife was diagnosed with Brain cancer and is admitted in the [facility].   Pt denies any smoking, alcohol or any form of substance abuse.   In the ED,   Pt appears comfortable, no signs of any distress.   No active bleeding in ED   Vitals- 114/52, Hr 85, afebrile   CT abd- Cirrohtic liver with portal HTN, varices, concerns for SVT   Hb- 3.8  Pt received 1 unit of PRBC. PLan to give 2 more  (12 Oct 2020 21:28)          REVIEW OF SYSTEMS  Constitutional:   No fever, no fatigue, no pallor, no night sweats, no weight loss.  HEENT:   No eye pain, no vision changes, no icterus, no mouth ulcers.  Respiratory:   No shortness of breath, no cough, no respiratory distress.   Cardiovascular:   No chest pain, no palpitations.   Gastrointestinal: No abdominal pain, no nausea, no vomiting , no diahrrea, no constipation, no hematochezia,no melena.  Skin:   No rashes, no jaundice, no eczema.   Musculoskeletal:   No joint pain, no swelling, no myalgia.   Neurologic:   No headache, no seizure, no weakness.   Genitourinary:   No dysuria, no decreased urine output.  Psychiatric:  No depression, no anxiety,   Endocrine:   No thyroid disease, no diabetes.  Heme/Lymphatic:   No anemia, no blood transfusions, no lymph node enlargement, no bleeding, no bruising.  ___________________________________________________________________________________________  Allergies    No Known Allergies    Intolerances      MEDICATIONS  (STANDING):  cefTRIAXone   IVPB      insulin lispro (HumaLOG) corrective regimen sliding scale   SubCutaneous three times a day before meals  pantoprazole  Injectable 40 milliGRAM(s) IV Push two times a day    MEDICATIONS  (PRN):      PAST MEDICAL & SURGICAL HISTORY:  Anemia    DM (diabetes mellitus)      FAMILY HISTORY:    Social History: No hsitory of : Tobacco use, IVDA, EToH  ______________________________________________________________________________________    PHYSICAL EXAM    Daily Height in cm: 182.88 (12 Oct 2020 15:51)    Daily   BMI: 23 (10-12 @ 15:51)  Change in Weight:  Vital Signs Last 24 Hrs  T(C): 36.8 (13 Oct 2020 04:07), Max: 37.1 (13 Oct 2020 01:15)  T(F): 98.2 (13 Oct 2020 04:07), Max: 98.7 (13 Oct 2020 01:15)  HR: 80 (13 Oct 2020 04:07) (80 - 96)  BP: 146/67 (13 Oct 2020 04:07) (110/65 - 146/67)  BP(mean): 75 (12 Oct 2020 21:15) (75 - 75)  RR: 17 (13 Oct 2020 04:07) (16 - 18)  SpO2: 98% (13 Oct 2020 04:07) (98% - 100%)    General:  Well developed, well nourished, alert and active, no pallor, NAD.  HEENT:    Normal appearance of conjunctiva, ears, nose, lips, oropharynx, and oral mucosa, anicteric.  Neck:  No masses, no asymmetry.  Lymph Nodes:  No lymphadenopathy.   Cardiovascular:  RRR normal S1/S2, no murmur.  Respiratory:  CTA B/L, normal respiratory effort.   Abdominal:   soft, no masses or tenderness, normoactive BS, NT/ND, no HSM.  Extremities:   No clubbing or cyanosis, normal capillary refill, no edema.   Skin:   No rash, jaundice, lesions, eczema.   Musculoskeletal:  No joint swelling, erythema or tenderness.   Neuro: No focal deficits.   Other:   _______________________________________________________________________________________________  Lab Results:                          7.1    4.78  )-----------( 216      ( 13 Oct 2020 04:26 )             21.0     10-13    137  |  105  |  41<H>  ----------------------------<  92  4.3   |  25  |  1.43<H>    Ca    8.2<L>      13 Oct 2020 04:26  Phos  2.5     10-13  Mg     1.6     10-13    TPro  5.6<L>  /  Alb  2.2<L>  /  TBili  1.1  /  DBili  x   /  AST  24  /  ALT  18  /  AlkPhos  54  10-13    LIVER FUNCTIONS - ( 13 Oct 2020 04:26 )  Alb: 2.2 g/dL / Pro: 5.6 g/dL / ALK PHOS: 54 U/L / ALT: 18 U/L DA / AST: 24 U/L / GGT: x           PT/INR - ( 13 Oct 2020 04:26 )   PT: 13.9 sec;   INR: 1.18 ratio         PTT - ( 13 Oct 2020 04:26 )  PTT:22.7 sec  Triglycerides, Serum: 89 mg/dL (10-13 @ 04:26)    CARDIAC MARKERS ( 13 Oct 2020 04:26 )  <0.015 ng/mL / x     / x     / x     / x      CARDIAC MARKERS ( 12 Oct 2020 16:50 )  <0.015 ng/mL / x     / x     / x     / x          Stool Results:          RADIOLOGY RESULTS:  < from: CT Angio Abdomen and Pelvis w/ IV Cont (10.12.20 @ 18:15) >    EXAM:  CT ANGIO ABD PELV (W)AW IC                            PROCEDURE DATE:  10/12/2020          INTERPRETATION:  CLINICAL INFORMATION: GI bleeding. Abdominal pain.    COMPARISON: None.    PROCEDURE:  CT of the Abdomen and Pelvis was performed with and without intravenous contrast.  Precontrast, Arterial and Delayed phases were performed.  Intravenous contrast: 90 ml Omnipaque 350. 10 ml discarded.  Oral contrast: None.  Sagittal and coronal reformats were performed.    FINDINGS:  LOWER CHEST:Trace bilateral pleural effusion and trace pericardial effusion.    LIVER: Shrunken liver with nodularity likely indicating cirrhosis.  BILE DUCTS: Normal caliber.  GALLBLADDER: Multiple gallstones without evidence of cholecystitis.  SPLEEN: Within normal limits.  PANCREAS: Within normal limits.  ADRENALS: Within normal limits.  KIDNEYS/URETERS: Right extrarenal pelvis with mild hydroureteronephrosis tracing to the level of the junction of the mid and distal ureter. No radiopaque stone is identified.    BLADDER: Suprapubic catheter in place. Collapsed.  REPRODUCTIVE ORGANS: Surgical clips. Prostatectomy?    BOWEL: No bowel obstruction. Appendix is not visualized.  PERITONEUM: Massive ascites. No hemoperitoneum.  VESSELS: No active extravasation. Prominent portal vein and superior mesenteric vein with small sized splenic vein. Extensive collateral veins around the stomach, spleen and mesentery in the left mid abdomen (19-and 90). Moderate atherosclerotic disease.  RETROPERITONEUM/LYMPH NODES: No lymphadenopathy.  ABDOMINAL WALL: Mild anasarca.  BONES: Degenerative changes.    IMPRESSION:  No active extravasation.    Cirrhotic liver with stigmata of portal hypertension with extensive left gastric, splenic and mesenteric varices.    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    Mild right hydroureteronephrosis without demonstratable obstructing lesions.    Multiple gallstones.            SARAH GUAJARDO M.D., ATTENDING RADIOLOGIST  This document has been electronically signed. Oct 12 2020  6:51PM    < end of copied text >    SURGICAL PATHOLOGY:

## 2020-10-13 NOTE — PROGRESS NOTE ADULT - PROBLEM SELECTOR PLAN 5
Pt has PMH of DM   Home meds Metformin 1000mg   full liquid diet  Sliding scale as per protocol  monitor FS No PMH of KENNY  most likely due to anemia  Nephrology dr. Forman onboard  CR 1.43 improving  f/u cmp in AM

## 2020-10-14 LAB
% ALBUMIN: 49.9 % — SIGNIFICANT CHANGE UP
% ALPHA 1: 6.8 % — SIGNIFICANT CHANGE UP
% ALPHA 2: 12.7 % — SIGNIFICANT CHANGE UP
% BETA: 12.8 % — SIGNIFICANT CHANGE UP
% GAMMA: 17.8 % — SIGNIFICANT CHANGE UP
A1C WITH ESTIMATED AVERAGE GLUCOSE RESULT: 5.5 % — SIGNIFICANT CHANGE UP (ref 4–5.6)
ALBUMIN SERPL ELPH-MCNC: 2.1 G/DL — LOW (ref 3.5–5)
ALBUMIN SERPL ELPH-MCNC: 2.5 G/DL — LOW (ref 3.6–5.5)
ALBUMIN/GLOB SERPL ELPH: 1 RATIO — SIGNIFICANT CHANGE UP
ALP SERPL-CCNC: 52 U/L — SIGNIFICANT CHANGE UP (ref 40–120)
ALPHA1 GLOB SERPL ELPH-MCNC: 0.3 G/DL — SIGNIFICANT CHANGE UP (ref 0.1–0.4)
ALPHA2 GLOB SERPL ELPH-MCNC: 0.6 G/DL — SIGNIFICANT CHANGE UP (ref 0.5–1)
ALT FLD-CCNC: 17 U/L DA — SIGNIFICANT CHANGE UP (ref 10–60)
ANA TITR SER: NEGATIVE — SIGNIFICANT CHANGE UP
ANION GAP SERPL CALC-SCNC: 5 MMOL/L — SIGNIFICANT CHANGE UP (ref 5–17)
AST SERPL-CCNC: 21 U/L — SIGNIFICANT CHANGE UP (ref 10–40)
B-GLOBULIN SERPL ELPH-MCNC: 0.6 G/DL — SIGNIFICANT CHANGE UP (ref 0.5–1)
BILIRUB SERPL-MCNC: 0.6 MG/DL — SIGNIFICANT CHANGE UP (ref 0.2–1.2)
BUN SERPL-MCNC: 29 MG/DL — HIGH (ref 7–18)
CALCIUM SERPL-MCNC: 8 MG/DL — LOW (ref 8.4–10.5)
CHLORIDE SERPL-SCNC: 102 MMOL/L — SIGNIFICANT CHANGE UP (ref 96–108)
CO2 SERPL-SCNC: 27 MMOL/L — SIGNIFICANT CHANGE UP (ref 22–31)
CREAT SERPL-MCNC: 1.29 MG/DL — SIGNIFICANT CHANGE UP (ref 0.5–1.3)
ESTIMATED AVERAGE GLUCOSE: 111 MG/DL — SIGNIFICANT CHANGE UP (ref 68–114)
GAMMA GLOBULIN: 0.9 G/DL — SIGNIFICANT CHANGE UP (ref 0.6–1.6)
GLUCOSE BLDC GLUCOMTR-MCNC: 194 MG/DL — HIGH (ref 70–99)
GLUCOSE BLDC GLUCOMTR-MCNC: 210 MG/DL — HIGH (ref 70–99)
GLUCOSE BLDC GLUCOMTR-MCNC: 239 MG/DL — HIGH (ref 70–99)
GLUCOSE BLDC GLUCOMTR-MCNC: 306 MG/DL — HIGH (ref 70–99)
GLUCOSE SERPL-MCNC: 205 MG/DL — HIGH (ref 70–99)
HAPTOGLOB SERPL-MCNC: 155 MG/DL — SIGNIFICANT CHANGE UP (ref 34–200)
HCT VFR BLD CALC: 22 % — LOW (ref 39–50)
HCT VFR BLD CALC: 24 % — LOW (ref 39–50)
HCT VFR BLD CALC: 25.8 % — LOW (ref 39–50)
HGB BLD-MCNC: 7.5 G/DL — LOW (ref 13–17)
HGB BLD-MCNC: 7.9 G/DL — LOW (ref 13–17)
HGB BLD-MCNC: 8.4 G/DL — LOW (ref 13–17)
MCHC RBC-ENTMCNC: 26.8 PG — LOW (ref 27–34)
MCHC RBC-ENTMCNC: 27.2 PG — SIGNIFICANT CHANGE UP (ref 27–34)
MCHC RBC-ENTMCNC: 27.4 PG — SIGNIFICANT CHANGE UP (ref 27–34)
MCHC RBC-ENTMCNC: 32.6 GM/DL — SIGNIFICANT CHANGE UP (ref 32–36)
MCHC RBC-ENTMCNC: 32.9 GM/DL — SIGNIFICANT CHANGE UP (ref 32–36)
MCHC RBC-ENTMCNC: 34.1 GM/DL — SIGNIFICANT CHANGE UP (ref 32–36)
MCV RBC AUTO: 80.3 FL — SIGNIFICANT CHANGE UP (ref 80–100)
MCV RBC AUTO: 81.4 FL — SIGNIFICANT CHANGE UP (ref 80–100)
MCV RBC AUTO: 83.5 FL — SIGNIFICANT CHANGE UP (ref 80–100)
NRBC # BLD: 0 /100 WBCS — SIGNIFICANT CHANGE UP (ref 0–0)
PLATELET # BLD AUTO: 234 K/UL — SIGNIFICANT CHANGE UP (ref 150–400)
PLATELET # BLD AUTO: 266 K/UL — SIGNIFICANT CHANGE UP (ref 150–400)
PLATELET # BLD AUTO: 301 K/UL — SIGNIFICANT CHANGE UP (ref 150–400)
POTASSIUM SERPL-MCNC: 4.1 MMOL/L — SIGNIFICANT CHANGE UP (ref 3.5–5.3)
POTASSIUM SERPL-SCNC: 4.1 MMOL/L — SIGNIFICANT CHANGE UP (ref 3.5–5.3)
PROT PATTERN SERPL ELPH-IMP: SIGNIFICANT CHANGE UP
PROT SERPL-MCNC: 5 G/DL — LOW (ref 6–8.3)
PROT SERPL-MCNC: 5 G/DL — LOW (ref 6–8.3)
PROT SERPL-MCNC: 5.5 G/DL — LOW (ref 6–8.3)
RBC # BLD: 2.74 M/UL — LOW (ref 4.2–5.8)
RBC # BLD: 2.95 M/UL — LOW (ref 4.2–5.8)
RBC # BLD: 3.09 M/UL — LOW (ref 4.2–5.8)
RBC # FLD: 17 % — HIGH (ref 10.3–14.5)
RBC # FLD: 17.1 % — HIGH (ref 10.3–14.5)
RBC # FLD: 17.2 % — HIGH (ref 10.3–14.5)
RHEUMATOID FACT SERPL-ACNC: <10 IU/ML — SIGNIFICANT CHANGE UP (ref 0–13)
SODIUM SERPL-SCNC: 134 MMOL/L — LOW (ref 135–145)
WBC # BLD: 5.18 K/UL — SIGNIFICANT CHANGE UP (ref 3.8–10.5)
WBC # BLD: 6.05 K/UL — SIGNIFICANT CHANGE UP (ref 3.8–10.5)
WBC # BLD: 6.51 K/UL — SIGNIFICANT CHANGE UP (ref 3.8–10.5)
WBC # FLD AUTO: 5.18 K/UL — SIGNIFICANT CHANGE UP (ref 3.8–10.5)
WBC # FLD AUTO: 6.05 K/UL — SIGNIFICANT CHANGE UP (ref 3.8–10.5)
WBC # FLD AUTO: 6.51 K/UL — SIGNIFICANT CHANGE UP (ref 3.8–10.5)

## 2020-10-14 RX ORDER — SOD SULF/SODIUM/NAHCO3/KCL/PEG
4000 SOLUTION, RECONSTITUTED, ORAL ORAL ONCE
Refills: 0 | Status: COMPLETED | OUTPATIENT
Start: 2020-10-14 | End: 2020-10-14

## 2020-10-14 RX ORDER — IRON SUCROSE 20 MG/ML
200 INJECTION, SOLUTION INTRAVENOUS ONCE
Refills: 0 | Status: COMPLETED | OUTPATIENT
Start: 2020-10-14 | End: 2020-10-14

## 2020-10-14 RX ORDER — LIDOCAINE 4 G/100G
1 CREAM TOPICAL EVERY 6 HOURS
Refills: 0 | Status: DISCONTINUED | OUTPATIENT
Start: 2020-10-14 | End: 2020-10-17

## 2020-10-14 RX ORDER — LANOLIN ALCOHOL/MO/W.PET/CERES
5 CREAM (GRAM) TOPICAL AT BEDTIME
Refills: 0 | Status: DISCONTINUED | OUTPATIENT
Start: 2020-10-14 | End: 2020-10-16

## 2020-10-14 RX ORDER — TRAMADOL HYDROCHLORIDE 50 MG/1
50 TABLET ORAL ONCE
Refills: 0 | Status: DISCONTINUED | OUTPATIENT
Start: 2020-10-14 | End: 2020-10-14

## 2020-10-14 RX ADMIN — TRAMADOL HYDROCHLORIDE 50 MILLIGRAM(S): 50 TABLET ORAL at 18:33

## 2020-10-14 RX ADMIN — Medication 2: at 17:13

## 2020-10-14 RX ADMIN — Medication 1: at 12:24

## 2020-10-14 RX ADMIN — MORPHINE SULFATE 2 MILLIGRAM(S): 50 CAPSULE, EXTENDED RELEASE ORAL at 04:39

## 2020-10-14 RX ADMIN — CEFTRIAXONE 100 MILLIGRAM(S): 500 INJECTION, POWDER, FOR SOLUTION INTRAMUSCULAR; INTRAVENOUS at 08:29

## 2020-10-14 RX ADMIN — MORPHINE SULFATE 2 MILLIGRAM(S): 50 CAPSULE, EXTENDED RELEASE ORAL at 05:55

## 2020-10-14 RX ADMIN — Medication 2: at 08:29

## 2020-10-14 RX ADMIN — OCTREOTIDE ACETATE 10 MICROGRAM(S)/HR: 200 INJECTION, SOLUTION INTRAVENOUS; SUBCUTANEOUS at 17:16

## 2020-10-14 RX ADMIN — LIDOCAINE 1 APPLICATION(S): 4 CREAM TOPICAL at 17:14

## 2020-10-14 RX ADMIN — OCTREOTIDE ACETATE 10 MICROGRAM(S)/HR: 200 INJECTION, SOLUTION INTRAVENOUS; SUBCUTANEOUS at 08:40

## 2020-10-14 RX ADMIN — LIDOCAINE 1 APPLICATION(S): 4 CREAM TOPICAL at 12:11

## 2020-10-14 RX ADMIN — PANTOPRAZOLE SODIUM 40 MILLIGRAM(S): 20 TABLET, DELAYED RELEASE ORAL at 05:50

## 2020-10-14 RX ADMIN — OCTREOTIDE ACETATE 10 MICROGRAM(S)/HR: 200 INJECTION, SOLUTION INTRAVENOUS; SUBCUTANEOUS at 03:09

## 2020-10-14 RX ADMIN — PANTOPRAZOLE SODIUM 40 MILLIGRAM(S): 20 TABLET, DELAYED RELEASE ORAL at 17:13

## 2020-10-14 RX ADMIN — Medication 4000 MILLILITER(S): at 18:23

## 2020-10-14 RX ADMIN — IRON SUCROSE 110 MILLIGRAM(S): 20 INJECTION, SOLUTION INTRAVENOUS at 12:11

## 2020-10-14 NOTE — DIETITIAN INITIAL EVALUATION ADULT. - NS FNS WEIGHT USED FOR CALC
ideal/Ht=6'    XNL=508 lb    Admission qs=026 lb    BMI=23.1   Current wm=914.5 lb 10/12/2020   3+ bilateral leg edema noted

## 2020-10-14 NOTE — DIETITIAN INITIAL EVALUATION ADULT. - PHYSICAL APPEARANCE
other (specify)/underweight/emaciated/thin appearance skin intact   Nutrition focused physical exam ( NFPE ) conducted with permission:    Subcutaneous fat loss: [ SEVERE ] Orbital fat pads region, [ SEVERE ]Buccal fat region, [ SEVERE ]Triceps region,  [  ]Ribs region.  Muscle wasting: [ SEVERE ]Temples region, [ SEVERE ]Clavicle region, [ SEVERE ]Shoulder region, [  ]Scapula region, [ SEVERE ]Interosseous region,  [   ]thigh region, [   ]Calf region     3+ bilateral leg edema noted

## 2020-10-14 NOTE — CONSULT NOTE ADULT - SUBJECTIVE AND OBJECTIVE BOX
HISTORY OF PRESENT ILLNESS: HPI:  80M from home, self-ambulating with PMH of chronic anemia, DM, chronic suprapubic catheter, chronic leg swelling, comes to the ED after a syncopal episode. Pt was in his usual state of health until 4 days ago when he first noted black tarry stool. He had a repeat episode the nest day and has been feeling weak and lethargic since then. He called his doctor friends in [place] who suggested him to visit ED. Today morning when he got off from his bed he was noted to be dizzy and then fell down. Pt doesn't remember distinctly but thinks he had LOC. Pt denies any chest pain, shortness of breath, palpitations, N/V, Abd pain, diarrhea or any NSAID or blood thinner use.   Pt stays alone at home. 8 months ago his wife was diagnosed with Brain cancer and is admitted in the [facility].   No CP, No SOB  Pt denies any smoking, alcohol or any form of substance abuse.   In the ED,   Pt appears comfortable, no signs of any distress.   No active bleeding in ED   Vitals- 114/52, Hr 85, afebrile   CT abd- Cirrohtic liver with portal HTN, varices, concerns for SVT   Hb- 3.8  Pt received 1 unit of PRBC. PLan to give 2 more  (12 Oct 2020 21:28)      PAST MEDICAL & SURGICAL HISTORY:  Anemia    DM (diabetes mellitus)            MEDICATIONS:  MEDICATIONS  (STANDING):  cefTRIAXone   IVPB      cefTRIAXone   IVPB 1000 milliGRAM(s) IV Intermittent every 24 hours  insulin lispro (HumaLOG) corrective regimen sliding scale   SubCutaneous three times a day before meals  iron sucrose IVPB 200 milliGRAM(s) IV Intermittent once  octreotide  Infusion 50 MICROgram(s)/Hr (10 mL/Hr) IV Continuous <Continuous>  pantoprazole  Injectable 40 milliGRAM(s) IV Push two times a day      Allergies    No Known Allergies    Intolerances        FAMILY HISTORY:    Non-contributary for premature coronary disease or sudden cardiac death    SOCIAL HISTORY:    [ X] Non-smoker  [ ] Smoker  [ ] Alcohol        REVIEW OF SYSTEMS:  [ ]chest pain  [  ]shortness of breath  [  ]palpitations  [ X ]syncope  [ ]near syncope [ ]upper extremity weakness   [ ] lower extremity weakness  [  ]diplopia  [  ]altered mental status   [  ]fevers  [ ]chills [ ]nausea  [ ]vomitting  [  ]dysphagia    [ ]abdominal pain  [ ]melena  [ ]BRBPR    [  ]epistaxis  [  ]rash    [ ]lower extremity edema        [ x] All others negative	  [ ] Unable to obtain      LABS:	 	    CARDIAC MARKERS:  CARDIAC MARKERS ( 13 Oct 2020 04:26 )  <0.015 ng/mL / x     / x     / x     / x      CARDIAC MARKERS ( 12 Oct 2020 16:50 )  <0.015 ng/mL / x     / x     / x     / x                                  7.5    5.18  )-----------( 234      ( 14 Oct 2020 06:37 )             22.0     Hb Trend: 7.5<--, 7.9<--, 8.1<--, 8.1<--    10-14    134<L>  |  102  |  29<H>  ----------------------------<  205<H>  4.1   |  27  |  1.29    Ca    8.0<L>      14 Oct 2020 06:37  Phos  2.5     10-13  Mg     1.6     10-13    TPro  5.5<L>  /  Alb  2.1<L>  /  TBili  0.6  /  DBili  x   /  AST  21  /  ALT  17  /  AlkPhos  52  10-14    Creatinine Trend: 1.29<--, 1.43<--, 1.84<--    PHYSICAL EXAM:  T(C): 36.3 (10-14-20 @ 05:07), Max: 36.8 (10-13-20 @ 16:47)  HR: 77 (10-14-20 @ 05:07) (77 - 98)  BP: 137/56 (10-14-20 @ 05:07) (119/50 - 141/54)  RR: 18 (10-14-20 @ 05:07) (18 - 18)  SpO2: 97% (10-14-20 @ 05:07) (97% - 100%)  Wt(kg): --   BMI (kg/m2): 23 (10-12-20 @ 15:51)  I&O's Summary    13 Oct 2020 07:01  -  14 Oct 2020 07:00  --------------------------------------------------------  IN: 0 mL / OUT: 775 mL / NET: -775 mL        Gen: Appears well in NAD  HEENT:  (-)icterus (-)pallor  CV: N S1 S2 1/6 AMBREEN (+)2 Pulses B/l  Resp:  Clear to ausculatation B/L, normal effort  GI: (+) BS Soft, NT, ND  Lymph:  (+)Edema, (-)obvious lymphadenopathy  Skin: Warm to touch, Normal turgor  Psych: Appropriate mood and affect      ECG:  	Sinus 94 BPM    RADIOLOGY:         CXR:  PENDING    ASSESSMENT/PLAN: 	80y Male PMH of chronic anemia, Cirrhosis, DM, chronic suprapubic catheter, chronic leg swelling, comes to the ED after a syncopal episode and suspected GI bleed.      - Suspect LOC was a hemodynamically mediated event in the setting of acute blood loss anemia\  - Check echo for completeness and to confirm lower extremity edema is not due to CHF  - DGI f/u  - MOnitor H/H  - Will follow with you    I once again thank you for allowing me to participate in the care of your patient.  If you have any questions or concerns please do not hesitate to contact me.    Lele Sanchez MD, [facility]  BEEPER [phone]

## 2020-10-14 NOTE — PROGRESS NOTE ADULT - PROBLEM SELECTOR PLAN 4
Pt had PMH of syncope today, unwitnessed  Symptoms likely from anemia  Pt mentions being dizzy after he woke up from sleep and walked towards restroom  EKG - NSR   trops x2 negative   Orthostatics negative  Carotid negative  f/u Echo. Pt had PMH of syncope today, unwitnessed  Cardiology Dr. Sanchez will f/u  Symptoms likely from anemia  Pt mentions being dizzy after he woke up from sleep and walked towards restroom  EKG - NSR   trops x2 negative   Orthostatics negative  Carotid negative  f/u Echo. Pt had PMH of syncope today, unwitnessed  Cardiology Dr. Sanchez will f/u  Symptoms likely from anemia  Pt mentions being dizzy after he woke up from sleep and walked towards restroom  EKG - NSR   trops x2 negative   Orthostatics negative  Carotid negative  Echo shows EF >55% with mild diastolic disfunction.

## 2020-10-14 NOTE — PROGRESS NOTE ADULT - PROBLEM SELECTOR PLAN 5
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  Patient was in usual state of health this as but around 1100 and found by family to be difficult to arose but did awaken, several hours later she was delirious and noted to have emesis.   There is a new skin changes on the left leg in the past few days. The skin changes on the right leg has been there for a year.  Patient brought to [facility] ER and CT head- no new changes. Glucose 285, Cr 0.86, WBC 20 K, Procalcitonin 0.29, UA unremarkable, CXR small right pleural effusion.  She was diagnosed with severe sepsis due to cellulitis and given IVFs, empiric Vanc and Cefepime, blood cultures obtained.  CT abd/pelvis/chest obtained without acute findings.  She had initial lactic acid of 2.9 and resolved with IVFs.  Patient with hypertensive  urgency due to missed meds. Patient transferred to the ICU.     Following admission she was continued on empiric abx, lactic acid normalized.  Blood pressure normalized    · Sever sepsis from right leg cellulitis -   · C/w rocephin. bld cx- NGTD.  · Switched to oral augmentin for 5 days.  ·       · Diabetes mellitus type 2 not on long term insulin with no complication - Will resume PTA metformin. C/w insulin S/S     · Morbid obesity with undiagnosed obstructive sleep apnea  · Will refer to sleep medicine for sleep study.    · Essential hypertension -   · Uncontrolled due to not taking meds.  · Husbands says when ever pt takes her pills she just becomes drowsy and sleep all the time and so stopping taking them.  ·  Will increase lisinopril. C/w to monitor.           Pending issues to be followed up by PCP   Needs sleep study     New Medications Started During Hospitalization:   augmentin  Metformin 1g bid    Medications Discontinued During Hospitalization:   Metformin 500mg bid      Discharge Medications:     Summary of your Discharge Medications      Take these Medications      Details   amLODIPine 10 MG tablet  Commonly known as:  NORVASC   Take 1 tablet by mouth daily.     amoxicillin-clavulanate 875-125 MG per tablet  Commonly known as:  AUGMENTIN   Take 1 tablet by mouth every 12 hours for 7 days.     aspirin 81 MG tablet   Take 1 tablet by mouth every other day.     carvedilol 25 MG tablet  Commonly known as:  COREG   Take 1.5 tablets by mouth 2 times daily (with meals).     * DISPENSE   1 [employer] Walk Aide     * DISPENSE   AFO Brace for right ankle foot arthrosis     glyBURIDE 5 MG tablet  Commonly known as:  DIABETA   Take 1 tablet by mouth daily (with breakfast).     lisinopril 20 MG tablet  Commonly known as:  ZESTRIL   Take 1 tablet by mouth daily.     metFORMIN 500 MG tablet  Commonly known as:  GLUCOPHAGE   Take 2 tablets by mouth 2 times daily (with meals).     ONE TOUCH LANCETS Misc   Use to test blood  sugar prior to breakfast and at bedtime     ONE TOUCH ULTRA TEST test strip   Generic drug:  blood glucose  Test blood sugar 2 times daily, prior to breakfast and at bedtime. . Diagnosis: E11.9 . Meter: One touch     PHARMACIST CHOICE AUTOCODE SYS w/Device Kit   Test blood sugar 2 times daily, prior to breakfast and at bedtime. Diagnosis: E11.9.     triamcinolone 0.5 % cream  Commonly known as:  KENALOG   Apply topically 2 times daily.         * This list has 2 medication(s) that are the same as other medications prescribed for you. Read the directions carefully, and ask your doctor or other care provider to review them with you.                Consults: pulmonary/intensive care    Procedures performed Xr Chest Pa And Lateral    Result Date: 10/10/2019  EXAM:XR CHEST PA AND LATERAL INDICATION:Sepsis COMPARISON: 4/27/2017 TECHNIQUE: Frontal and lateral views of the chest FINDINGS: Respiratory motion on the lateral view. Mild enlargement of the cardiac silhouette. The pulmonary vascularity is within normal limits. No airspace consolidation or pneumothorax. Small right pleural effusion. Degenerative changes in the spine. Degenerative changes both shoulders.     IMPRESSION: 1. Small right pleural effusion and mild enlargement of the cardiac silhouette.     Ct Chest Abdomen Pelvis Wo Contrast    Result Date: 10/10/2019  EXAM:  CT CHEST ABDOMEN PELVIS WO CONTRAST TECHNIQUE: CT scan of the chest, abdomen and pelvis without intravenous contrast. Multiplanar reconstructions were performed by the technologist and images were sent to the PACS station for interpretation. HISTORY:  Sepsis COMPARISONS: 5/3/2017 FINDINGS: Chest CT: The thyroid gland shows a few subcentimeter nodules. The visualized supraclavicular fossae and both axilla appear unremarkable. The trachea and bronchi appear normal. There is no endobronchial lesion. The mediastinal vascular structures have an unremarkable noncontrast appearance. Mild coronary artery  calcification is seen. The heart size is normal. There is no pericardial effusion. Thoracic esophagus appears normal. There are no enlarged mediastinal or hilar lymph nodes. Mild thickening of the upper portion of the right major fissure. A tiny calcified granuloma is seen in the lateral segment of right middle lobe. No airspace consolidation or significant atelectasis. No other noncalcified nodule is seen. There is no mass lesion. There is no interstitial thickening. There is no pleural effusion or pneumothorax bilaterally. Significant age-related degenerative changes of the thoracic spine are noted. There are no displaced rib fractures. The stomach appears unremarkable. There are degenerative changes in the stomach no manubrial joints bilaterally right more than left side. Abdomen and pelvis: Multiple calculi are seen in the gallbladder neck region without evidence of gallbladder wall thickening or pericholecystic fluid. The gallbladder is normally distended. The common bile duct is not dilated. Intrahepatic biliary radicals are not dilated. The liver, pancreas, spleen and left adrenal gland are unremarkable. There is a stable 3 cm right adrenal adenoma with few small foci of calcification. Both kidneys are normal in size and position. There is no renal or ureteric calculi. There is no hydronephrosis or hydroureter. Mild nonspecific bilateral perinephric fat stranding is seen. No obvious mass or cyst is evident on this noncontrast examination. The urinary bladder is partially distended but grossly unremarkable. Stomach is decompressed. Duodenum appears unremarkable. The small bowel loops are not dilated. There is no abnormal wall thickening. No enlarged mesenteric lymphadenopathy. Ileocecal junction appears normal. Appendix is not definitely identified however there are no extremity changes in the right lower quadrant to suggest acute appendicitis. Moderate amount of stool is seen in the right and transverse  colon. Descending colon and sigmoid colon are unremarkable. Rectum is unremarkable. Minimal atherosclerotic calcification of the abdominal aorta is noted without evidence of aneurysm formation. IVC has an unremarkable noncontrast appearance. There is no free fluid or free air in the abdomen or pelvis. Midline and bilateral paramidline anterior lower abdominal/pelvic hernias are noted containing omental fat and nonobstructed loops of small bowel. No obvious CT findings to suggest incarceration. Within the lower anterior pelvis immediately deep to the hernia there is moderate to significant fat stranding seen within the omentum which could represent fat necrosis likely chronic. Please correlate clinically for any focal tenderness in this region. This was not seen on the prior CT done on 5/3/2017. At that time the peritoneal is seen an mostly contained omental fat without any small bowel loops. Significant age-related degenerative changes of the lumbar spine are noted.     IMPRESSION: No significant abnormality is seen in the chest CT. Subcentimeter nodules are seen in the thyroid gland. Consider further evaluation with a baseline thyroid ultrasound. Cholelithiasis without evidence of acute cholecystitis. Stable appearing 3 cm right adrenal adenoma. Lower anterior abdominal/pelvic hernia containing omental fat and nonobstructed loops of small bowel without definite CT evidence of incarceration. Just deep to the hernia in the anterior lower pelvis there is a prominent area off heterogenous fat stranding within the omentum which could represent fat necrosis likely chronic. Please correlate clinically for any focal tenderness in this region. Although the hernia was seen on the prior examination at that time there was on the omental fat and no bowel loops. The presumed chronic omental fat necrosis seen in the lower anterior pelvis is also new finding compared to the prior examination. No free fluid or free air in the  abdomen or pelvis.     Ct Head Wo Contrast    Result Date: 10/10/2019  EXAM:CT HEAD WO CONTRAST INDICATION:Altered level of consciousness COMPARISON: CT 5/11/2017 TECHNIQUE: Unenhanced head CT FINDINGS: Confluent low-attenuation change in the periventricular and subcortical white matter. Ventricles are midline without hydrocephalus. Chronic lacunar infarctions left basal ganglia and caudate head, right periventricular white matter and right cerebellum. Interval resolution of the intraparenchymal hemorrhage. There is no acute intracranial hemorrhage or evidence of acute cortical infarction. Mild mucosal thickening in the paranasal sinuses.     IMPRESSION: 1. No acute intracranial abnormality. 2. Advanced chronic ischemic change with resolution of the intracranial blood products since the prior.     Us Lower Extremity Venous Duplex Right    Result Date: 10/10/2019  US LOWER EXTREMITY VENOUS DUPLEX RIGHT CLINICAL INDICATION:  ?DVT. COMPARISON: None. TECHNIQUE: Gray-scale and color/spectral Doppler ultrasound of right lower extremity deep venous systems was performed. FINDINGS: Normal compression sonography was documented in right common femoral, superficial femoral, profunda femoral, saphenous, and popliteal veins. Normal color Doppler flow was seen within these vessels, and there is normal spectral Doppler response to augmentation. Right peroneal veins are not well-visualized due to leg edema and patient movement. The other proximal calf vessels are patent. Patient could not perform Valsalva maneuver.     IMPRESSION: No evidence of deep venous thrombosis in the right lower extremity deep venous system. Please note that the right peroneal veins were not well visualized due to leg edema and patient movement. Patient could not perform Valsalva maneuver.      Discharge Labs:  Recent Labs   Lab 10/12/19  0333 10/11/19  0210 10/10/19  1845   SODIUM 138 140 136   POTASSIUM 3.7 3.8 4.1   CHLORIDE 108* 107 102   CO2 23 25 26    BUN 16 14 16   CREATININE 0.83 0.81 0.86   GLUCOSE 197* 214* 285*   WBC 8.7 13.7* 20.1*   HGB 11.0* 10.9* 13.1   HCT 34.9* 34.6* 41.1    221 282   PT  --   --  10.0   INR  --   --  1.0           Discharge Exam:  Blood pressure 176/81, pulse 78, temperature 98.3 °F (36.8 °C), temperature source Oral, resp. rate 18, height 5' 4\" (1.626 m), weight 112.7 kg, SpO2 92 %.    GEN: NAD. AOx3  CHEST: Lungs CTA and percussion anteriorly and posteriorly. No rales, wheezing or crackles.  CV: RRR. S1, S2 normal. No S3, S4. No murmurs, rubs, gallops.   ABDOMEN: Soft, non-tender. No rebound or guarding. No masses or hepatosplenomegaly. No hernias.  EXT: No cyanosis or edema. No joint effusion.      Disposition: Home    Patient Instructions:     Activity:activity as tolerated  Diet: diabetic  Wound Care: None needed     Code status: Full Resuscitation    Carol Souza MD  [id number] [facility] [id number] [phone]    In 7 days  post hodpital discharge visit          .    Time spent on discharge was greater than 30 minutes    Signed:  Carlos Frank MD  10/13/2019  8:28 AM     Pt has PMH of DM   Home meds Metformin 1000mg   full liquid diet  Humalog sliding scale as per protocol  monitor FS. Pt has PMH of DM   Home meds Metformin 1000mg   full liquid diet with nutritional supplement  Humalog sliding scale as per protocol  monitor FS.

## 2020-10-14 NOTE — PROGRESS NOTE ADULT - PROBLEM SELECTOR PLAN 6
No PMH of KENNY  most likely due to anemia  Nephrology dr. Forman onboard  CR 1.43 improving  f/u cmp in AM.

## 2020-10-14 NOTE — PHARMACOTHERAPY INTERVENTION NOTE - COMMENTS
Age Friendly Medication Audit: Patient's medication list reviewed for appropriate indication and dosages for older adults. Pt is currently on Morphine (opioid) which may cause and thus should be monitored for unsteady gait, psychomotor impairment, syncope (Beer's List). No other recommendations at this time.

## 2020-10-14 NOTE — PROGRESS NOTE ADULT - SUBJECTIVE AND OBJECTIVE BOX
complete note to follow     · Subjective and Objective:   History of Present Illness:  Reason for Admission: SYNCOPE  History of Present Illness:   80M from home, self-ambulating with PMH of chronic anemia, DM, chronic suprapubic catheter, chronic leg swelling, comes to the ED after a syncopal episode today. Pt was in his usual state of health until 4 days ago when he first noted black tarry stool. He had a repeat episode the nest day and has been feeling weak and lethargic since then. He called his doctor friends in [place] who suggested him to visit ED. Today morning when he got off from his bed he was noted to be dizzy and then fell down. Pt doesn't remember distinctly but thinks he had LOC. Pt denies any chest pain, shortness of breath, palpitations, N/V, Abd pain, diarrhea or any NSAID or blood thinner use.   Pt stays alone at home. 8 months ago his wife was diagnosed with Brain cancer and is admitted in the [facility].   Pt denies any smoking, alcohol or any form of substance abuse.   In the ED,   Pt appears comfortable, no signs of any distress.   No active bleeding in ED   Vitals- 114/52, Hr 85, afebrile   CT abd- Cirrohtic liver with portal HTN, varices, concerns for SVT   Hb- 3.8  Pt received 1 unit of PRBC. PLan to give 2 more     Hem/OncHx: Pt states Dx with Melanoma 2014, treated with resection only. Prostate Cancer Dx 2015, s/p radiation, no further tx given and in remission. Pt states he has long hx of anemia, but no prior transfusions. Dx with cirrhosis 2017 pt states due to Hepatitis (type unknown) and follows with Hepatology at [facility], on ?lasix, paracentesis approx q 6 months.     Interval: Pt seen and examined at bedside. Pt c/o penile pain 7 out of 10. Morphine and lidocaine were not helpful as per pt. Started venofer, Hgb stable. Scheduled for GI w/u tomorrow    REVIEW OF SYSTEMS  Constitutional:   No fever, + fatigue, + pallor, no night sweats, no weight loss.  HEENT:   No eye pain, no vision changes, no icterus, no mouth ulcers.  Respiratory:   No shortness of breath, no cough, no respiratory distress.   Cardiovascular:   No chest pain, no palpitations.   Gastrointestinal: +abdominal distention, No abdominal pain, no nausea, no vomiting , + diarrhea, no constipation, no hematochezia, + melena.  Skin:   No rashes, no jaundice, no eczema.   Musculoskeletal:   No joint pain, + LE swelling, no myalgia.   Neurologic:   No headache, no seizure, no weakness.   Genitourinary:   No dysuria, no decreased urine output.  Psychiatric:  No depression, no anxiety,   Endocrine:   No thyroid disease, no diabetes.  Heme/Lymphatic:   No anemia, no blood transfusions, no lymph node enlargement, no bleeding, no bruising.  ___________________________________________________________________________________________  Allergies: No Known Allergies    Home Medications:  Pt states he takes a water pill two days/week (prescribed by Hepatologist)    MEDICATIONS  (STANDING):  cefTRIAXone   IVPB      insulin lispro (HumaLOG) corrective regimen sliding scale   SubCutaneous three times a day before meals  pantoprazole  Injectable 40 milliGRAM(s) IV Push two times a day    Patient History:     PAST MEDICAL HISTORY:  Anemia   DM (diabetes mellitus)  cirrhosis  Melanoma  Prostate CA    Social History: Pt stays alone at home. 8 months ago his wife was diagnosed with Brain cancer and is admitted in the [facility]. Pt usually gets his food delivered  Denies ETOH    FamHx: denies any bleeding disorders or cancer diagnosis     Tobacco Screening:  · Core Measure Site	Yes  · Has the patient used tobacco in the past 30 days?	No    Risk Assessment:    Present on Admission:  Deep Venous Thrombosis	no  Pulmonary Embolus	no     Heart Failure:  Does this patient have a history of or has been diagnosed with heart failure? unknown.    Physical Exam    General:  awake, pallor, NAD.  HEENT:    Normal appearance of conjunctiva, ears, nose, lips, oropharynx, and oral mucosa, anicteric.  Neck:  No masses, no asymmetry.  Lymph Nodes:  No lymphadenopathy.   Cardiovascular:  S1/S2, no murmur.  Respiratory:  CTA B/L, normal respiratory effort.   Abdominal:   tense, distended, BS +, unable to assess for hepatosplenomegaly  Extremities:   B/L LE pitting edema L>R with LLE + tenderness  Skin:   No rash, jaundice    Neuro: No focal deficits.     Labs:    CBC Full  -  ( 14 Oct 2020 06:37 )  WBC Count : 5.18 K/uL  RBC Count : 2.74 M/uL  Hemoglobin : 7.5 g/dL  Hematocrit : 22.0 %  Platelet Count - Automated : 234 K/uL  Mean Cell Volume : 80.3 fl  Mean Cell Hemoglobin : 27.4 pg  Mean Cell Hemoglobin Concentration : 34.1 gm/dL  Auto Neutrophil # : x  Auto Lymphocyte # : x  Auto Monocyte # : x  Auto Eosinophil # : x  Auto Basophil # : x  Auto Neutrophil % : x  Auto Lymphocyte % : x  Auto Monocyte % : x  Auto Eosinophil % : x  Auto Basophil % : x    10-14    134<L>  |  102  |  29<H>  ----------------------------<  205<H>  4.1   |  27  |  1.29    Ca    8.0<L>      14 Oct 2020 06:37  Phos  2.5     10-13  Mg     1.6     10-13    TPro  5.5<L>  /  Alb  2.1<L>  /  TBili  0.6  /  DBili  x   /  AST  21  /  ALT  17  /  AlkPhos  52  10-14        < from: CT Angio Abdomen and Pelvis w/ IV Cont (10.12.20 @ 18:15) >  EXAM:  CT ANGIO ABD PELV (W)AW IC                            PROCEDURE DATE:  10/12/2020          INTERPRETATION:  CLINICAL INFORMATION: GI bleeding. Abdominal pain.    COMPARISON: None.    PROCEDURE:  CT of the Abdomen and Pelvis was performed with and without intravenous contrast.  Precontrast, Arterial and Delayed phases were performed.  Intravenous contrast: 90 ml Omnipaque 350. 10 ml discarded.  Oral contrast: None.  Sagittal and coronal reformats were performed.    FINDINGS:  LOWER CHEST:Trace bilateral pleural effusion and trace pericardial effusion.    LIVER: Shrunken liver with nodularity likely indicating cirrhosis.  BILE DUCTS: Normal caliber.  GALLBLADDER: Multiple gallstones without evidence of cholecystitis.  SPLEEN: Within normal limits.  PANCREAS: Within normal limits.  ADRENALS: Within normal limits.  KIDNEYS/URETERS: Right extrarenal pelvis with mild hydroureteronephrosis tracing to the level of the junction of the mid and distal ureter. No radiopaque stone is identified.    BLADDER: Suprapubic catheter in place. Collapsed.  REPRODUCTIVE ORGANS: Surgical clips. Prostatectomy?    BOWEL: No bowel obstruction. Appendix is not visualized.  PERITONEUM: Massive ascites. No hemoperitoneum.  VESSELS: No active extravasation. Prominent portal vein and superior mesenteric vein with small sized splenic vein. Extensive collateral veins around the stomach, spleen and mesentery in the left mid abdomen (19-and 90). Moderate atherosclerotic disease.  RETROPERITONEUM/LYMPH NODES: No lymphadenopathy.  ABDOMINAL WALL: Mild anasarca.  BONES: Degenerative changes.    IMPRESSION:  No active extravasation.    Cirrhotic liver with stigmata of portal hypertension with extensive left gastric, splenic and mesenteric varices.    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    Mild right hydroureteronephrosis without demonstratable obstructing lesions.    Multiple gallstones.            SARAH GUAJARDO M.D., ATTENDING RADIOLOGIST  This document has been electronically signed. Oct 12 2020  6:51PM    < end of copied text >  < from: CT Chest No Cont (10.13.20 @ 09:27) >    EXAM:  CT CHEST                            *** ADDENDUM 10/13/2020  ***    NP Mr. Del Toro is informed.    *** END OF ADDENDUM 10/13/2020  ***      PROCEDURE DATE:  10/13/2020          INTERPRETATION:  Chest CT without IV contrast    Indication: Dyspnea.    Technique: Axial multidetector CT images of the chest are acquired without IV contrast.    Comparison: No prior chest CT is available for comparison. Reference is made with a previous abdominal CT dated 10/12/2020.    Findings: Mild bilateral pleural effusions. Mild pericardial effusion. The heart is not enlarged. Aortic, coronary artery, and mitral annular calcifications are present. Mild ectasia of the ascending aorta at 3.7 cm in diameter. Allowing for the noncontrast technique, there isno grossly enlarged mediastinal, hilar, or axillary lymph node.    The trachea and central bronchi are patent. Small linear densities in the trachea, likely representing mucus secretion.    No evidence of pneumothorax, pulmonary consolidation or mass. Small calcified granuloma in the right upper lobe. A few small noncalcified nonspecific lung nodules in the upper lobes bilaterally measuring up to 5 mm in the left upper lobe (image 55 series 3); if the patient's is in the high risk category, follow-up chest CT may be pursued in 12 months to ensure stability. Small bilateral atelectasis.    Limited sections through the upper abdomen again demonstrate nodular contour of the liver, suggestive of cirrhosis. Cholelithiasis. Large ascites again noted. IV contrast excretion in the left collecting system from recent contrast abdominal CT.    Ankylosing spondylitis. Apparent nondisplaced acute horizontal fracture at the inferior T5 vertebra with involvement of the inferior endplate (image 96 series7 and image 87 series 9).    Impression: Apparent nondisplaced acute horizontal fracture at the inferior T5 vertebra with involvement of the inferior endplate.    A few small lung nodules in the upper lobes bilaterally; if the patient's is in the high risk category (i.e. smoker), follow-up chest CT may be pursued in 12 months to ensure stability.    No evidence of pneumothorax, pulmonary consolidation or mass.    Small linear densities in the trachea, likely representing mucus secretion.    Mild bilateral pleural effusions.    Mild pericardial effusion.    ***Please see the addendum at the top of this report. It may contain additional important information or changes.****        ROSSI HOBSON M.D., ATTENDING RADIOLOGIST  This document has been electronically signed. Oct 13 2020 10:05AM  Addend:ROSSI HOBSON M.D., ATTENDING RADIOLOGIST  This addendum was electronically signed on: Oct 13 2020 11:20AM.    < end of copied text >    < from: US Duplex Venous Lower Ext Ltd, Left (10.13.20 @ 16:17) >  EXAM:  US DPLX LWR EXT VEINS LTD LT                            PROCEDURE DATE:  10/13/2020          INTERPRETATION:  CLINICAL INFORMATION: Left lower extremity swelling    COMPARISON: None available.    TECHNIQUE: Duplex sonography of the LEFT LOWER extremity veins with color and spectral Doppler, with and without compression. Note that the left lung gastrocnemius veins are not visualized.    FINDINGS:    There is normal compressibility of the left common femoral, femoral and popliteal veins.  Doppler examination shows normal spontaneous and phasic flow.    No calf vein thrombosis is detected. Calf edema is present.    IMPRESSION:  No evidence of left lower extremity deep venous thrombosis.   Please note, there is limited visualization of the left calf veins due to calf edema.  Therefore, if  clinical concern persists, reevaluation can be performed after 5 to 7 days to evaluate for propagation of clot.                KATRIN WHITE M.D., ATTENDING RADIOLOGIST  This document has been electronically signed. Oct 13 2020  4:33PM    < end of copied text >       · Subjective and Objective:   History of Present Illness:  Reason for Admission: SYNCOPE  History of Present Illness:   80M from home, self-ambulating with PMH of chronic anemia, DM, chronic suprapubic catheter, chronic leg swelling, comes to the ED after a syncopal episode today. Pt was in his usual state of health until 4 days ago when he first noted black tarry stool. He had a repeat episode the nest day and has been feeling weak and lethargic since then. He called his doctor friends in [place] who suggested him to visit ED. Today morning when he got off from his bed he was noted to be dizzy and then fell down. Pt doesn't remember distinctly but thinks he had LOC. Pt denies any chest pain, shortness of breath, palpitations, N/V, Abd pain, diarrhea or any NSAID or blood thinner use.   Pt stays alone at home. 8 months ago his wife was diagnosed with Brain cancer and is admitted in the [facility].   Pt denies any smoking, alcohol or any form of substance abuse.   In the ED,   Pt appears comfortable, no signs of any distress.   No active bleeding in ED   Vitals- 114/52, Hr 85, afebrile   CT abd- Cirrohtic liver with portal HTN, varices, concerns for SVT   Hb- 3.8  Pt received 1 unit of PRBC. PLan to give 2 more     Hem/OncHx: Pt states Dx with Melanoma 2014, treated with resection only. Prostate Cancer Dx 2015, s/p radiation, no further tx given and in remission. Pt states he has long hx of anemia, but no prior transfusions. Dx with cirrhosis 2017 pt states due to Hepatitis (type unknown) and follows with Hepatology at [facility], on ?lasix, paracentesis approx q 6 months.     Interval: Pt seen and examined at bedside. Pt c/o penile pain 7 out of 10. Morphine and lidocaine were not helpful as per pt. Started venofer, Hgb stable. Scheduled for GI w/u tomorrow    REVIEW OF SYSTEMS  Constitutional:   No fever, + fatigue, + pallor, no night sweats, no weight loss.  HEENT:   No eye pain, no vision changes, no icterus, no mouth ulcers.  Respiratory:   No shortness of breath, no cough, no respiratory distress.   Cardiovascular:   No chest pain, no palpitations.   Gastrointestinal: +abdominal distention, No abdominal pain, no nausea, no vomiting , no diarrhea, no constipation, no hematochezia, + melena.  Skin:   No rashes, no jaundice, no eczema.   Musculoskeletal:   No joint pain, + LE swelling, no myalgia.   Neurologic:   No headache, no seizure, no weakness.   Genitourinary:   No dysuria, no decreased urine output.  Psychiatric:  No depression, no anxiety,   Endocrine:   No thyroid disease, no diabetes.  Heme/Lymphatic:   No anemia, no blood transfusions, no lymph node enlargement, no bleeding, no bruising.  ___________________________________________________________________________________________  Allergies: No Known Allergies    Home Medications:  Pt states he takes a water pill two days/week (prescribed by Hepatologist)    MEDICATIONS  (STANDING):  cefTRIAXone   IVPB      insulin lispro (HumaLOG) corrective regimen sliding scale   SubCutaneous three times a day before meals  pantoprazole  Injectable 40 milliGRAM(s) IV Push two times a day    Patient History:     PAST MEDICAL HISTORY:  Anemia   DM (diabetes mellitus)  cirrhosis  Melanoma  Prostate CA    Social History: Pt stays alone at home. 8 months ago his wife was diagnosed with Brain cancer and is admitted in the [facility]. Pt usually gets his food delivered  Denies ETOH    FamHx: denies any bleeding disorders or cancer diagnosis     Tobacco Screening:  · Core Measure Site	Yes  · Has the patient used tobacco in the past 30 days?	No    Risk Assessment:    Present on Admission:  Deep Venous Thrombosis	no  Pulmonary Embolus	no     Heart Failure:  Does this patient have a history of or has been diagnosed with heart failure? unknown.    Physical Exam    General:  awake, pallor, NAD.  HEENT:    Normal appearance of conjunctiva, ears, nose, lips, oropharynx, and oral mucosa, anicteric.  Neck:  No masses, no asymmetry.  Lymph Nodes:  No lymphadenopathy.   Cardiovascular:  S1/S2, no murmur.  Respiratory:  CTA B/L, normal respiratory effort.   Abdominal:   tense, distended, BS +, unable to assess for hepatosplenomegaly  Extremities:   B/L LE pitting edema L>R with LLE + tenderness  Skin:   No rash, jaundice    Neuro: No focal deficits.     Labs:    CBC Full  -  ( 14 Oct 2020 06:37 )  WBC Count : 5.18 K/uL  RBC Count : 2.74 M/uL  Hemoglobin : 7.5 g/dL  Hematocrit : 22.0 %  Platelet Count - Automated : 234 K/uL  Mean Cell Volume : 80.3 fl  Mean Cell Hemoglobin : 27.4 pg  Mean Cell Hemoglobin Concentration : 34.1 gm/dL  Auto Neutrophil # : x  Auto Lymphocyte # : x  Auto Monocyte # : x  Auto Eosinophil # : x  Auto Basophil # : x  Auto Neutrophil % : x  Auto Lymphocyte % : x  Auto Monocyte % : x  Auto Eosinophil % : x  Auto Basophil % : x    10-14    134<L>  |  102  |  29<H>  ----------------------------<  205<H>  4.1   |  27  |  1.29    Ca    8.0<L>      14 Oct 2020 06:37  Phos  2.5     10-13  Mg     1.6     10-13    TPro  5.5<L>  /  Alb  2.1<L>  /  TBili  0.6  /  DBili  x   /  AST  21  /  ALT  17  /  AlkPhos  52  10-14        < from: CT Angio Abdomen and Pelvis w/ IV Cont (10.12.20 @ 18:15) >  EXAM:  CT ANGIO ABD PELV (W)AW IC                            PROCEDURE DATE:  10/12/2020          INTERPRETATION:  CLINICAL INFORMATION: GI bleeding. Abdominal pain.    COMPARISON: None.    PROCEDURE:  CT of the Abdomen and Pelvis was performed with and without intravenous contrast.  Precontrast, Arterial and Delayed phases were performed.  Intravenous contrast: 90 ml Omnipaque 350. 10 ml discarded.  Oral contrast: None.  Sagittal and coronal reformats were performed.    FINDINGS:  LOWER CHEST:Trace bilateral pleural effusion and trace pericardial effusion.    LIVER: Shrunken liver with nodularity likely indicating cirrhosis.  BILE DUCTS: Normal caliber.  GALLBLADDER: Multiple gallstones without evidence of cholecystitis.  SPLEEN: Within normal limits.  PANCREAS: Within normal limits.  ADRENALS: Within normal limits.  KIDNEYS/URETERS: Right extrarenal pelvis with mild hydroureteronephrosis tracing to the level of the junction of the mid and distal ureter. No radiopaque stone is identified.    BLADDER: Suprapubic catheter in place. Collapsed.  REPRODUCTIVE ORGANS: Surgical clips. Prostatectomy?    BOWEL: No bowel obstruction. Appendix is not visualized.  PERITONEUM: Massive ascites. No hemoperitoneum.  VESSELS: No active extravasation. Prominent portal vein and superior mesenteric vein with small sized splenic vein. Extensive collateral veins around the stomach, spleen and mesentery in the left mid abdomen (19-and 90). Moderate atherosclerotic disease.  RETROPERITONEUM/LYMPH NODES: No lymphadenopathy.  ABDOMINAL WALL: Mild anasarca.  BONES: Degenerative changes.    IMPRESSION:  No active extravasation.    Cirrhotic liver with stigmata of portal hypertension with extensive left gastric, splenic and mesenteric varices.    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    Mild right hydroureteronephrosis without demonstratable obstructing lesions.    Multiple gallstones.            SARAH GUAJARDO M.D., ATTENDING RADIOLOGIST  This document has been electronically signed. Oct 12 2020  6:51PM    < end of copied text >  < from: CT Chest No Cont (10.13.20 @ 09:27) >    EXAM:  CT CHEST                            *** ADDENDUM 10/13/2020  ***    NP Mr. Del Toro is informed.    *** END OF ADDENDUM 10/13/2020  ***      PROCEDURE DATE:  10/13/2020          INTERPRETATION:  Chest CT without IV contrast    Indication: Dyspnea.    Technique: Axial multidetector CT images of the chest are acquired without IV contrast.    Comparison: No prior chest CT is available for comparison. Reference is made with a previous abdominal CT dated 10/12/2020.    Findings: Mild bilateral pleural effusions. Mild pericardial effusion. The heart is not enlarged. Aortic, coronary artery, and mitral annular calcifications are present. Mild ectasia of the ascending aorta at 3.7 cm in diameter. Allowing for the noncontrast technique, there isno grossly enlarged mediastinal, hilar, or axillary lymph node.    The trachea and central bronchi are patent. Small linear densities in the trachea, likely representing mucus secretion.    No evidence of pneumothorax, pulmonary consolidation or mass. Small calcified granuloma in the right upper lobe. A few small noncalcified nonspecific lung nodules in the upper lobes bilaterally measuring up to 5 mm in the left upper lobe (image 55 series 3); if the patient's is in the high risk category, follow-up chest CT may be pursued in 12 months to ensure stability. Small bilateral atelectasis.    Limited sections through the upper abdomen again demonstrate nodular contour of the liver, suggestive of cirrhosis. Cholelithiasis. Large ascites again noted. IV contrast excretion in the left collecting system from recent contrast abdominal CT.    Ankylosing spondylitis. Apparent nondisplaced acute horizontal fracture at the inferior T5 vertebra with involvement of the inferior endplate (image 96 series7 and image 87 series 9).    Impression: Apparent nondisplaced acute horizontal fracture at the inferior T5 vertebra with involvement of the inferior endplate.    A few small lung nodules in the upper lobes bilaterally; if the patient's is in the high risk category (i.e. smoker), follow-up chest CT may be pursued in 12 months to ensure stability.    No evidence of pneumothorax, pulmonary consolidation or mass.    Small linear densities in the trachea, likely representing mucus secretion.    Mild bilateral pleural effusions.    Mild pericardial effusion.    ***Please see the addendum at the top of this report. It may contain additional important information or changes.****        ROSSI HOBSON M.D., ATTENDING RADIOLOGIST  This document has been electronically signed. Oct 13 2020 10:05AM  Addend:ROSSI HOBSON M.D., ATTENDING RADIOLOGIST  This addendum was electronically signed on: Oct 13 2020 11:20AM.    < end of copied text >    < from: US Duplex Venous Lower Ext Ltd, Left (10.13.20 @ 16:17) >  EXAM:  US DPLX LWR EXT VEINS LTD LT                            PROCEDURE DATE:  10/13/2020          INTERPRETATION:  CLINICAL INFORMATION: Left lower extremity swelling    COMPARISON: None available.    TECHNIQUE: Duplex sonography of the LEFT LOWER extremity veins with color and spectral Doppler, with and without compression. Note that the left lung gastrocnemius veins are not visualized.    FINDINGS:    There is normal compressibility of the left common femoral, femoral and popliteal veins.  Doppler examination shows normal spontaneous and phasic flow.    No calf vein thrombosis is detected. Calf edema is present.    IMPRESSION:  No evidence of left lower extremity deep venous thrombosis.   Please note, there is limited visualization of the left calf veins due to calf edema.  Therefore, if  clinical concern persists, reevaluation can be performed after 5 to 7 days to evaluate for propagation of clot.                KATRIN WHITE M.D., ATTENDING RADIOLOGIST  This document has been electronically signed. Oct 13 2020  4:33PM    < end of copied text >    < from: Transthoracic Echocardiogram (10.14.20 @ 09:40) >    Patient name: AYE MELENDEZ  YOB: 1940   Age: 80 (M)   MR#: [id number]  Study Date: 10/14/2020  Location: [facility] ([place])Sonographer: Oscar Ramon LOLY  Study quality: Fair  Referring Physician:  SILAS DIAZ MD  Blood Pressure: 146/67 mmHg  Height: 183 cm  Weight: 77 kg  BSA: 2 m2  ------------------------------------------------------------------------    PROCEDURE: Transthoracic echocardiogram with 2-D, M-Mode  and complete spectral and color flow Doppler.  INDICATION: Syncope and Collapse (R55)  HISTORY:  ------------------------------------------------------------------------  DIMENSIONS:  Dimensions:     Normal Values:  LA:     3.8 cm    2.0 - 4.0 cm  Ao:     3.1 cm    2.0 - 3.8 cm  SEPTUM: 1.2 cm    0.6 - 1.2 cm  PWT:    1.0 cm 0.6 - 1.1 cm  LVIDd:  3.4 cm    3.0 - 5.6 cm  LVIDs:  2.4 cm    1.8 - 4.0 cm      Derived Variables:  LVMI: 57 g/m2  RWT: 0.58  Ejection Fraction Visual Estimate: >55 %    ------------------------------------------------------------------------  OBSERVATIONS:  Mitral Valve: Mitral annular calcification. Trace mitral  regurgitation.  Aortic Root: Normal aortic root.  Aortic Valve: Calcified aortic valve with normal opening.  Left Atrium: Normal left atrium.  LA volume index = 26  cc/m2.  Left Ventricle: Normal left ventricular systolic function.   Mild diastolic dysfunction (stage I). Increased relative  wall thickness with normal left ventricular (LV) mass  index, consistent with concentric LV remodeling.  Right Heart: Normal right atrium. Normal right ventricular  size and function. There is mild tricuspid regurgitation.  There is trace pulmonic regurgitation.  Pericardium/PleuraTrivial pericardial effusion.  Hemodynamic: RA Pressure is 8 mm Hg. RV systolic pressure  is 24 mm Hg.  ------------------------------------------------------------------------  CONCLUSIONS:  1. Mitral annular calcification. Trace mitral  regurgitation.  2. Increased relative wall thickness with normal left  ventricular (LV) mass index, consistent with concentric LV  remodeling.  3. Normal left ventricular systolic function.   Mild  diastolic dysfunction (stage I).  4. Normal right ventricular size and function.  5. Trivial pericardial effusion.    ------------------------------------------------------------------------  Confirmed on  10/14/2020 - 13:32:32 by Lele Sanchez MD  ------------------------------------------------------------------------    < end of copied text >  < from: US Duplex Carotid Arteries Complete, Bilateral (10.13.20 @ 11:46) >    EXAM:  US DPLX CAROTIDS COMPL BI                            PROCEDURE DATE:  10/13/2020          INTERPRETATION:  ULTRASOUND OF THE CAROTID ARTERIES    CLINICAL INDICATION: Syncope.    TECHNIQUE:   Doppler ultrasonography of the carotid arteries was performed utilizing grayscale, color and spectral Doppler.   The magnitude of stenosis was estimated based on established tables of systolic peak velocity related to the magnitude of carotid stenosis.    COMPARISON: No prior studies available for comparison.    FINDINGS:  Atherosclerotic plaques are identified within the bilateral common carotid arteries, carotid bulbs, as well as within the bilateral internal and external carotid arteries.    Antegrade flow is present in both vertebral arteries.    Velocities expressed in centimeters per second are as follows:    RIGHT:  Proximal CCA = 95.7; Distal CCA = 98.3; Proximal ICA = 209.3; Distal ICA = 106.6;  ECA = 143.8  LEFT:    Proximal CCA = 97.0; Distal CCA = 115.9; Proximal ICA = 90.4; Distal ICA = 95.6;  ECA = 104.8    Right peak systolic IC/CC velocity ratio: 2.1  Left peak systolic IC/CC velocity ratio: 0.8    IMPRESSION:  1.  Right carotid system:  50-69% right ICA diameter stenosis.  2.  Left carotid system:  No hemodynamically significant stenosis is found.  3. CTA/MRA evaluation can be performed as clinically desired.            EMILIE URENA M.D., ATTENDING RADIOLOGIST  This document has been electronically signed. Oct 13 2020 11:59AM    < end of copied text >

## 2020-10-14 NOTE — DIETITIAN INITIAL EVALUATION ADULT. - PERTINENT LABORATORY DATA
10-14 Na134 mmol/L<L> Glu 205 mg/dL<H> K+ 4.1 mmol/L Cr  1.29 mg/dL BUN 29 mg/dL<H>   10-13 Phos 2.5 mg/dL   10-14 Alb 2.1 g/dL<L>       10-13 Chol 110 mg/dL LDL 67 mg/dL HDL 25 mg/dL<L> Trig 89 mg/dL  10-13-20 @ 09:51 HgbA1C 5.5 [4.0 - 5.6]

## 2020-10-14 NOTE — PROGRESS NOTE ADULT - NUTRITIONAL ASSESSMENT
This patient has been assessed with a concern for Malnutrition and has been determined to have a diagnosis/diagnoses of Severe protein-calorie malnutrition.    This patient is being managed with:   Diet Clear Liquid-  Supplement Feeding Modality:  Oral  Ensure Clear Cans or Servings Per Day:  1       Frequency:  Three Times a day  Entered: Oct 14 2020  1:10PM    Diet NPO after Midnight-     NPO Start Date: 14-Oct-2020   NPO Start Time: 23:59  Entered: Oct 14 2020  9:54AM    
This patient has been assessed with a concern for Malnutrition and has been determined to have a diagnosis/diagnoses of Severe protein-calorie malnutrition.    This patient is being managed with:   Diet Clear Liquid-  Supplement Feeding Modality:  Oral  Ensure Clear Cans or Servings Per Day:  1       Frequency:  Three Times a day  Entered: Oct 14 2020  1:10PM    Diet NPO after Midnight-     NPO Start Date: 14-Oct-2020   NPO Start Time: 23:59  Entered: Oct 14 2020  9:54AM    Diet Clear Liquid-  Entered: Oct 13 2020  1:03PM    The following pending diet order is being considered for treatment of Severe protein-calorie malnutrition:null

## 2020-10-14 NOTE — PROGRESS NOTE ADULT - SUBJECTIVE AND OBJECTIVE BOX
Summary:   80y  Male      Subjective:       Objective:    MEDICATIONS  (STANDING):  cefTRIAXone   IVPB      cefTRIAXone   IVPB 1000 milliGRAM(s) IV Intermittent every 24 hours  insulin lispro (HumaLOG) corrective regimen sliding scale   SubCutaneous every 6 hours  octreotide  Infusion 50 MICROgram(s)/Hr (10 mL/Hr) IV Continuous <Continuous>  pantoprazole  Injectable 40 milliGRAM(s) IV Push two times a day    MEDICATIONS  (PRN):  lidocaine 2% Gel 1 Application(s) Topical every 6 hours PRN moderate pain  melatonin 5 milliGRAM(s) Oral at bedtime PRN Sleep  morphine  - Injectable 2 milliGRAM(s) IV Push every 6 hours PRN Severe Pain (7 - 10)              Vital Signs Last 24 Hrs  T(C): 36.9 (15 Oct 2020 05:15), Max: 36.9 (14 Oct 2020 13:39)  T(F): 98.4 (15 Oct 2020 05:15), Max: 98.4 (14 Oct 2020 13:39)  HR: 84 (15 Oct 2020 05:15) (79 - 92)  BP: 128/53 (15 Oct 2020 05:15) (128/53 - 147/68)  BP(mean): --  RR: 18 (15 Oct 2020 05:15) (16 - 18)  SpO2: 100% (15 Oct 2020 05:15) (100% - 100%)      General:  Well developed, well nourished, alert and active, no pallor, NAD.  HEENT:    Normal appearance of conjunctiva, ears, nose, lips, oropharynx, and oral mucosa, anicteric.  Neck:  No masses, no asymmetry.  Lymph Nodes:  No lymphadenopathy.   Cardiovascular:  RRR normal S1/S2, no murmur.  Respiratory:  CTA B/L, normal respiratory effort.   Abdominal:   soft, no masses or tenderness, normoactive BS, NT/ND, no HSM.  Extremities:   No clubbing or cyanosis, normal capillary refill, no edema.   Skin:   No rash, jaundice, lesions, eczema.   Musculoskeletal:  No joint swelling, erythema or tenderness.   Neuro: No focal deficits.   Other:       LABS:                        8.2    6.99  )-----------( 256      ( 15 Oct 2020 06:42 )             24.3     10-15    133<L>  |  99  |  17  ----------------------------<  179<H>  4.1   |  29  |  1.16    Ca    8.0<L>      15 Oct 2020 06:42    TPro  5.8<L>  /  Alb  2.2<L>  /  TBili  0.6  /  DBili  x   /  AST  20  /  ALT  18  /  AlkPhos  56  10-15          RADIOLOGY & ADDITIONAL TESTS:

## 2020-10-14 NOTE — DIETITIAN INITIAL EVALUATION ADULT. - OTHER INFO
Pt from home, alert, oriented, speaks Citizen of [place] and fluent English, able to communicate well; gradually decreased intake, worsening x 2m, lives alone, wife in hospital x 8m due to brain cancer, not cooking at home, on taking-out food daily, unintended wt loss x 1y ( see below), denied GI distress, chewing or swallowing problem at present, no specific food allergy/intolerance reported; NPO/Clear Liquid diet x 2 to 3d in-house, pending Endoscopy Pt from home, alert, oriented, speaks [place] and fluent English, able to communicate well; gradually decreased intake, worsening x 2m, lives alone, wife in hospital x 8m due to brain cancer, not cooking at home, on taking-out food daily, unintended wt loss x 1y ( see below), denied GI distress, chewing or swallowing problem at present, no specific food allergy/intolerance reported; NPO/Clear Liquid diet x 2 to 3d in-house, pending Endoscopy; h/o DM, [id number] noted; Discussed with NP Pt from home, alert, oriented, speaks [place] and fluent English, able to communicate well; gradually decreased intake, worsening x 2m, lives alone, wife in hospital x 8m due to brain cancer, not cooking at home, on taking-out food daily, unintended wt loss x 1y ( see below), denied GI distress, chewing or swallowing problem at present, no specific food allergy/intolerance reported; NPO/Clear Liquid diet x 2 to 3d in-house, pending Endoscopy; h/o DM, [id number]=3.5 noted Pt from home, alert, oriented, speaks Bulgarian and fluent English, able to communicate well; gradually decreased intake, worsening x 2m, lives alone, wife in hospital x 8m due to brain cancer, not cooking at home, on taking-out food daily, unintended wt loss x 1y ( see below), denied GI distress, chewing or swallowing problem at present, no specific food allergy/intolerance reported; NPO/Clear Liquid diet x 2 to 3d in-house, pending Endoscopy; h/o DM, [id number]=0.5 noted; Discussed with RN

## 2020-10-14 NOTE — PROGRESS NOTE ADULT - SUBJECTIVE AND OBJECTIVE BOX
NP Note     Patient is a 80y old  Male who presents with a chief complaint of SYNCOPY (14 Oct 2020 09:54)    HPI:  Patient is 80y  from home, self-ambulating with PMH of chronic anemia, DM, chronic suprapubic catheter, chronic leg swelling, comes to the ED after a syncopal episode. Pt was in his usual state of health until 4 days ago when he first noted black tarry stool. He had a repeat episode the next day and has been feeling weak and lethargic since then.      Patient was admitted to medicine for systematic anemia and syncopal episode.  Patient received total of  3 U PRBC for low H&H (3.9/12.7), today H&H 8.1/23.4. 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 GI dr. Alarcon onboard, patient scheduled for EGD and colonoscopy for Thursday. Patient also is followed by heme/oncology for anemia, recommended f/u CBC q 6-8h and blood transfusion if hemoglobin drops <7.  Patient seen by nephrology dr. Forman for KENNY on admission which is improving (Cr 1.43 down from 1.84 on admission) most likely due to severe anemia.      INTERVAL HPI/OVERNIGHT EVENTS: no new complaints    MEDICATIONS  (STANDING):  cefTRIAXone   IVPB      cefTRIAXone   IVPB 1000 milliGRAM(s) IV Intermittent every 24 hours  insulin lispro (HumaLOG) corrective regimen sliding scale   SubCutaneous three times a day before meals  iron sucrose IVPB 200 milliGRAM(s) IV Intermittent once  octreotide  Infusion 50 MICROgram(s)/Hr (10 mL/Hr) IV Continuous <Continuous>  pantoprazole  Injectable 40 milliGRAM(s) IV Push two times a day    MEDICATIONS  (PRN):  lidocaine 2% Gel 1 Application(s) Topical every 6 hours PRN moderate pain  morphine  - Injectable 2 milliGRAM(s) IV Push every 6 hours PRN Severe Pain (7 - 10)      __________________________________________________  REVIEW OF SYSTEMS:    CONSTITUTIONAL: No fever,   EYES: no acute visual disturbances  NECK: No pain or stiffness  RESPIRATORY: No cough; No shortness of breath  CARDIOVASCULAR: No chest pain, no palpitations  GASTROINTESTINAL: No pain. No nausea or vomiting; No diarrhea   NEUROLOGICAL:  No headache or numbness, no tremors  MUSCULOSKELETAL: No joint pain, no muscle pain  GENITOURINARY: no dysuria, no frequency, no hesitancy, suprapubic catheter  PSYCHIATRY: no depression , no anxiety  ALL OTHER  ROS negative        Vital Signs Last 24 Hrs  T(C): 36.3 (14 Oct 2020 05:07), Max: 36.8 (13 Oct 2020 16:47)  T(F): 97.4 (14 Oct 2020 05:07), Max: 98.2 (13 Oct 2020 16:47)  HR: 77 (14 Oct 2020 05:07) (77 - 98)  BP: 137/56 (14 Oct 2020 05:07) (119/50 - 141/54)  BP(mean): --  RR: 18 (14 Oct 2020 05:07) (18 - 18)  SpO2: 97% (14 Oct 2020 05:07) (97% - 100%)    ________________________________________________  PHYSICAL EXAM:  GENERAL: NAD  HEENT: Normocephalic;  conjunctivae and sclerae clear; moist mucous membranes;   NECK : supple  CHEST/LUNG: Clear to auscultation bilaterally with good air entry   HEART: S1 S2  regular; no murmurs, gallops or rubs  ABDOMEN: Soft, Nontender, distended; Bowel sounds present  EXTREMITIES: no cyanosis; +2 dependent edema; no calf tenderness  SKIN: warm and dry; no rash  NERVOUS SYSTEM:  Awake and alert; Oriented  to place, person and time ; no new deficits    _________________________________________________  LABS:                        7.5    5.18  )-----------( 234      ( 14 Oct 2020 06:37 )             22.0     10-14    134<L>  |  102  |  29<H>  ----------------------------<  205<H>  4.1   |  27  |  1.29    Ca    8.0<L>      14 Oct 2020 06:37  Phos  2.5     10-13  Mg     1.6     10-13    TPro  5.5<L>  /  Alb  2.1<L>  /  TBili  0.6  /  DBili  x   /  AST  21  /  ALT  17  /  AlkPhos  52  10-14    PT/INR - ( 13 Oct 2020 04:26 )   PT: 13.9 sec;   INR: 1.18 ratio         PTT - ( 13 Oct 2020 04:26 )  PTT:22.7 sec  Urinalysis Basic - ( 12 Oct 2020 17:24 )    Color: Yellow / Appearance: Slightly Turbid / S.020 / pH: x  Gluc: x / Ketone: Negative  / Bili: Negative / Urobili: Negative   Blood: x / Protein: 100 / Nitrite: Negative   Leuk Esterase: Moderate / RBC: 2-5 /HPF / WBC 26-50 /HPF   Sq Epi: x / Non Sq Epi: Occasional /HPF / Bacteria: Few /HPF      CAPILLARY BLOOD GLUCOSE      POCT Blood Glucose.: 239 mg/dL (14 Oct 2020 07:52)  POCT Blood Glucose.: 238 mg/dL (13 Oct 2020 21:45)  POCT Blood Glucose.: 199 mg/dL (13 Oct 2020 16:55)  POCT Blood Glucose.: 103 mg/dL (13 Oct 2020 12:02)        RADIOLOGY & ADDITIONAL TESTS:    EXAM:  US DPLX LWR EXT VEINS LTD LT                            PROCEDURE DATE:  10/13/2020          INTERPRETATION:  CLINICAL INFORMATION: Left lower extremity swelling    COMPARISON: None available.    TECHNIQUE: Duplex sonography of the LEFT LOWER extremity veins with color and spectral Doppler, with and without compression. Note that the left lung gastrocnemius veins are not visualized.    FINDINGS:    There is normal compressibility of the left common femoral, femoral and popliteal veins.  Doppler examination shows normal spontaneous and phasic flow.    No calf vein thrombosis is detected. Calf edema is present.    IMPRESSION:  No evidence of left lower extremity deep venous thrombosis.   Please note, there is limited visualization of the left calf veins due to calf edema.  Therefore, if  clinical concern persists, reevaluation can be performed after 5 to 7 days to evaluate for propagation of clot.      EXAM:  CT CHEST                            *** ADDENDUM 10/13/2020  ***    NP Mr. Del Toro is informed.    *** END OF ADDENDUM 10/13/2020  ***      PROCEDURE DATE:  10/13/2020          INTERPRETATION:  Chest CT without IV contrast    Indication: Dyspnea.    Technique: Axial multidetector CT images of the chest are acquired without IV contrast.    Comparison: No prior chest CT is available for comparison. Reference is made with a previous abdominal CT dated 10/12/2020.    Findings: Mild bilateral pleural effusions. Mild pericardial effusion. The heart is not enlarged. Aortic, coronary artery, and mitral annular calcifications are present. Mild ectasia of the ascending aorta at 3.7 cm in diameter. Allowing for the noncontrast technique, there isno grossly enlarged mediastinal, hilar, or axillary lymph node.    The trachea and central bronchi are patent. Small linear densities in the trachea, likely representing mucus secretion.    No evidence of pneumothorax, pulmonary consolidation or mass. Small calcified granuloma in the right upper lobe. A few small noncalcified nonspecific lung nodules in the upper lobes bilaterally measuring up to 5 mm in the left upper lobe (image 55 series 3); if the patient's is in the high risk category, follow-up chest CT may be pursued in 12 months to ensure stability. Small bilateral atelectasis.    Limited sections through the upper abdomen again demonstrate nodular contour of the liver, suggestive of cirrhosis. Cholelithiasis. Large ascites again noted. IV contrast excretion in the left collecting system from recent contrast abdominal CT.    Ankylosing spondylitis. Apparent nondisplaced acute horizontal fracture at the inferior T5 vertebra with involvement of the inferior endplate (image 96 series7 and image 87 series 9).    Impression: Apparent nondisplaced acute horizontal fracture at the inferior T5 vertebra with involvement of the inferior endplate.    A few small lung nodules in the upper lobes bilaterally; if the patient's is in the high risk category (i.e. smoker), follow-up chest CT may be pursued in 12 months to ensure stability.    No evidence of pneumothorax, pulmonary consolidation or mass.    Small linear densities in the trachea, likely representing mucus secretion.    Mild bilateral pleural effusions.    Mild pericardial effusion.    ***Please see the addendum at the top of this report. It may contain additional important information or changes.****        EXAM:  CT ANGIO ABD PELV (W)AW IC                            PROCEDURE DATE:  10/12/2020          INTERPRETATION:  CLINICAL INFORMATION: GI bleeding. Abdominal pain.    COMPARISON: None.    PROCEDURE:  CT of the Abdomen and Pelvis was performed with and without intravenous contrast.  Precontrast, Arterial and Delayed phases were performed.  Intravenous contrast: 90 ml Omnipaque 350. 10 ml discarded.  Oral contrast: None.  Sagittal and coronal reformats were performed.    FINDINGS:  LOWER CHEST:Trace bilateral pleural effusion and trace pericardial effusion.    LIVER: Shrunken liver with nodularity likely indicating cirrhosis.  BILE DUCTS: Normal caliber.  GALLBLADDER: Multiple gallstones without evidence of cholecystitis.  SPLEEN: Within normal limits.  PANCREAS: Within normal limits.  ADRENALS: Within normal limits.  KIDNEYS/URETERS: Right extrarenal pelvis with mild hydroureteronephrosis tracing to the level of the junction of the mid and distal ureter. No radiopaque stone is identified.    BLADDER: Suprapubic catheter in place. Collapsed.  REPRODUCTIVE ORGANS: Surgical clips. Prostatectomy?    BOWEL: No bowel obstruction. Appendix is not visualized.  PERITONEUM: Massive ascites. No hemoperitoneum.  VESSELS: No active extravasation. Prominent portal vein and superior mesenteric vein with small sized splenic vein. Extensive collateral veins around the stomach, spleen and mesentery in the left mid abdomen (19-and 90). Moderate atherosclerotic disease.  RETROPERITONEUM/LYMPH NODES: No lymphadenopathy.  ABDOMINAL WALL: Mild anasarca.  BONES: Degenerative changes.    IMPRESSION:  No active extravasation.    Cirrhotic liver with stigmata of portal hypertension with extensive left gastric, splenic and mesenteric varices.    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    Mild right hydroureteronephrosis without demonstratable obstructing lesions.    Multiple gallstones.    SARAH GUAJARDO M.D., ATTENDING RADIOLOGIST  This document has been electronically signed. Oct 12 2020  6:51PM    ROSSI HOBSON M.D., ATTENDING RADIOLOGIST  This document has been electronically signed. Oct 13 2020 10:05AM  Addend:ROSSI HOBSON M.D., ATTENDING RADIOLOGIST  This addendum was electronically signed on: Oct 13 2020 11:20AM.    Imaging  Reviewed:  YES    Consultant(s) Notes Reviewed:   YES      Plan of care was discussed with patient and /or primary care giver; all questions and concerns were addressed NP Note     Patient is a 80y old  Male who presents with a chief complaint of SYNCOPY (14 Oct 2020 09:54)    HPI:  Patient is 80y  from home, self-ambulating with PMH of chronic anemia, DM, chronic suprapubic catheter, chronic leg swelling, comes to the ED after a syncopal episode. Pt was in his usual state of health until 4 days ago when he first noted black tarry stool. He had a repeat episode the next day and has been feeling weak and lethargic since then.      Patient was admitted to medicine for systematic anemia and syncopal episode.  Patient received total of  3 U PRBC for low H&H (3.9/12.7), today H&H 8.1/23.4. 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 GI dr. Alarcon onboard, patient scheduled for EGD and colonoscopy for Thursday. Patient also is followed by heme/oncology for anemia, recommended f/u CBC q 6-8h and blood transfusion if hemoglobin drops <7.  Patient seen by nephrology dr. Forman for KENNY on admission which is improving (Cr 1.43 down from 1.84 on admission) most likely due to severe anemia. Cardiology consult obtain, pt seen by Dr. Sanchez pending echo results. As per dr. Sanchez syncope mediated by blood loss and anemia secondary to GI bleeding. Patient assessed by nutritionist for potential malnutrition and has been determined to have a diagnosis/diagnoses of Severe protein-calorie malnutrition. Recommendation to advance diet with nutritional supplement as medically feasible.      Patient seen and examined at bedside.    INTERVAL HPI/OVERNIGHT EVENTS: no new complaints    Penile pain addressed with PRN lidocaine topical gel.      MEDICATIONS  (STANDING):  cefTRIAXone   IVPB      cefTRIAXone   IVPB 1000 milliGRAM(s) IV Intermittent every 24 hours  insulin lispro (HumaLOG) corrective regimen sliding scale   SubCutaneous three times a day before meals  iron sucrose IVPB 200 milliGRAM(s) IV Intermittent once  octreotide  Infusion 50 MICROgram(s)/Hr (10 mL/Hr) IV Continuous <Continuous>  pantoprazole  Injectable 40 milliGRAM(s) IV Push two times a day    MEDICATIONS  (PRN):  lidocaine 2% Gel 1 Application(s) Topical every 6 hours PRN moderate pain  morphine  - Injectable 2 milliGRAM(s) IV Push every 6 hours PRN Severe Pain (7 - 10)      __________________________________________________  REVIEW OF SYSTEMS:    CONSTITUTIONAL: No fever,   EYES: no acute visual disturbances  NECK: No pain or stiffness  RESPIRATORY: No cough; No shortness of breath  CARDIOVASCULAR: No chest pain, no palpitations  GASTROINTESTINAL: No pain. No nausea or vomiting; No diarrhea   NEUROLOGICAL:  No headache or numbness, no tremors  MUSCULOSKELETAL: No joint pain, no muscle pain  GENITOURINARY: no dysuria, no frequency, no hesitancy, suprapubic catheter  PSYCHIATRY: no depression , no anxiety  ALL OTHER  ROS negative        Vital Signs Last 24 Hrs  T(C): 36.3 (14 Oct 2020 05:07), Max: 36.8 (13 Oct 2020 16:47)  T(F): 97.4 (14 Oct 2020 05:07), Max: 98.2 (13 Oct 2020 16:47)  HR: 77 (14 Oct 2020 05:07) (77 - 98)  BP: 137/56 (14 Oct 2020 05:07) (119/50 - 141/54)  BP(mean): --  RR: 18 (14 Oct 2020 05:07) (18 - 18)  SpO2: 97% (14 Oct 2020 05:07) (97% - 100%)    ________________________________________________  PHYSICAL EXAM:  GENERAL: NAD  HEENT: Normocephalic;  conjunctivae and sclerae clear; moist mucous membranes;   NECK : supple  CHEST/LUNG: Clear to auscultation bilaterally with good air entry   HEART: S1 S2  regular; no murmurs, gallops or rubs  ABDOMEN: Soft, Nontender, distended; Bowel sounds present  EXTREMITIES: no cyanosis; +2 dependent edema; no calf tenderness  SKIN: warm and dry; no rash  NERVOUS SYSTEM:  Awake and alert; Oriented  to place, person and time ; no new deficits    _________________________________________________  LABS:                        7.5    5.18  )-----------( 234      ( 14 Oct 2020 06:37 )             22.0     10-14    134<L>  |  102  |  29<H>  ----------------------------<  205<H>  4.1   |  27  |  1.29    Ca    8.0<L>      14 Oct 2020 06:37  Phos  2.5     10-13  Mg     1.6     10-13    TPro  5.5<L>  /  Alb  2.1<L>  /  TBili  0.6  /  DBili  x   /  AST  21  /  ALT  17  /  AlkPhos  52  10-14    PT/INR - ( 13 Oct 2020 04:26 )   PT: 13.9 sec;   INR: 1.18 ratio         PTT - ( 13 Oct 2020 04:26 )  PTT:22.7 sec  Urinalysis Basic - ( 12 Oct 2020 17:24 )    Color: Yellow / Appearance: Slightly Turbid / S.020 / pH: x  Gluc: x / Ketone: Negative  / Bili: Negative / Urobili: Negative   Blood: x / Protein: 100 / Nitrite: Negative   Leuk Esterase: Moderate / RBC: 2-5 /HPF / WBC 26-50 /HPF   Sq Epi: x / Non Sq Epi: Occasional /HPF / Bacteria: Few /HPF      CAPILLARY BLOOD GLUCOSE      POCT Blood Glucose.: 239 mg/dL (14 Oct 2020 07:52)  POCT Blood Glucose.: 238 mg/dL (13 Oct 2020 21:45)  POCT Blood Glucose.: 199 mg/dL (13 Oct 2020 16:55)  POCT Blood Glucose.: 103 mg/dL (13 Oct 2020 12:02)        RADIOLOGY & ADDITIONAL TESTS:    EXAM:  US DPLX LWR EXT VEINS LTD LT                            PROCEDURE DATE:  10/13/2020          INTERPRETATION:  CLINICAL INFORMATION: Left lower extremity swelling    COMPARISON: None available.    TECHNIQUE: Duplex sonography of the LEFT LOWER extremity veins with color and spectral Doppler, with and without compression. Note that the left lung gastrocnemius veins are not visualized.    FINDINGS:    There is normal compressibility of the left common femoral, femoral and popliteal veins.  Doppler examination shows normal spontaneous and phasic flow.    No calf vein thrombosis is detected. Calf edema is present.    IMPRESSION:  No evidence of left lower extremity deep venous thrombosis.   Please note, there is limited visualization of the left calf veins due to calf edema.  Therefore, if  clinical concern persists, reevaluation can be performed after 5 to 7 days to evaluate for propagation of clot.      EXAM:  CT CHEST                            *** ADDENDUM 10/13/2020  ***    NP Mr. Del Toro is informed.    *** END OF ADDENDUM 10/13/2020  ***      PROCEDURE DATE:  10/13/2020          INTERPRETATION:  Chest CT without IV contrast    Indication: Dyspnea.    Technique: Axial multidetector CT images of the chest are acquired without IV contrast.    Comparison: No prior chest CT is available for comparison. Reference is made with a previous abdominal CT dated 10/12/2020.    Findings: Mild bilateral pleural effusions. Mild pericardial effusion. The heart is not enlarged. Aortic, coronary artery, and mitral annular calcifications are present. Mild ectasia of the ascending aorta at 3.7 cm in diameter. Allowing for the noncontrast technique, there isno grossly enlarged mediastinal, hilar, or axillary lymph node.    The trachea and central bronchi are patent. Small linear densities in the trachea, likely representing mucus secretion.    No evidence of pneumothorax, pulmonary consolidation or mass. Small calcified granuloma in the right upper lobe. A few small noncalcified nonspecific lung nodules in the upper lobes bilaterally measuring up to 5 mm in the left upper lobe (image 55 series 3); if the patient's is in the high risk category, follow-up chest CT may be pursued in 12 months to ensure stability. Small bilateral atelectasis.    Limited sections through the upper abdomen again demonstrate nodular contour of the liver, suggestive of cirrhosis. Cholelithiasis. Large ascites again noted. IV contrast excretion in the left collecting system from recent contrast abdominal CT.    Ankylosing spondylitis. Apparent nondisplaced acute horizontal fracture at the inferior T5 vertebra with involvement of the inferior endplate (image 96 series7 and image 87 series 9).    Impression: Apparent nondisplaced acute horizontal fracture at the inferior T5 vertebra with involvement of the inferior endplate.    A few small lung nodules in the upper lobes bilaterally; if the patient's is in the high risk category (i.e. smoker), follow-up chest CT may be pursued in 12 months to ensure stability.    No evidence of pneumothorax, pulmonary consolidation or mass.    Small linear densities in the trachea, likely representing mucus secretion.    Mild bilateral pleural effusions.    Mild pericardial effusion.    ***Please see the addendum at the top of this report. It may contain additional important information or changes.****        EXAM:  CT ANGIO ABD PELV (W)AW IC                            PROCEDURE DATE:  10/12/2020          INTERPRETATION:  CLINICAL INFORMATION: GI bleeding. Abdominal pain.    COMPARISON: None.    PROCEDURE:  CT of the Abdomen and Pelvis was performed with and without intravenous contrast.  Precontrast, Arterial and Delayed phases were performed.  Intravenous contrast: 90 ml Omnipaque 350. 10 ml discarded.  Oral contrast: None.  Sagittal and coronal reformats were performed.    FINDINGS:  LOWER CHEST:Trace bilateral pleural effusion and trace pericardial effusion.    LIVER: Shrunken liver with nodularity likely indicating cirrhosis.  BILE DUCTS: Normal caliber.  GALLBLADDER: Multiple gallstones without evidence of cholecystitis.  SPLEEN: Within normal limits.  PANCREAS: Within normal limits.  ADRENALS: Within normal limits.  KIDNEYS/URETERS: Right extrarenal pelvis with mild hydroureteronephrosis tracing to the level of the junction of the mid and distal ureter. No radiopaque stone is identified.    BLADDER: Suprapubic catheter in place. Collapsed.  REPRODUCTIVE ORGANS: Surgical clips. Prostatectomy?    BOWEL: No bowel obstruction. Appendix is not visualized.  PERITONEUM: Massive ascites. No hemoperitoneum.  VESSELS: No active extravasation. Prominent portal vein and superior mesenteric vein with small sized splenic vein. Extensive collateral veins around the stomach, spleen and mesentery in the left mid abdomen (19-and 90). Moderate atherosclerotic disease.  RETROPERITONEUM/LYMPH NODES: No lymphadenopathy.  ABDOMINAL WALL: Mild anasarca.  BONES: Degenerative changes.    IMPRESSION:  No active extravasation.    Cirrhotic liver with stigmata of portal hypertension with extensive left gastric, splenic and mesenteric varices.    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    Mild right hydroureteronephrosis without demonstratable obstructing lesions.    Multiple gallstones.    SARAH GUAJARDO M.D., ATTENDING RADIOLOGIST  This document has been electronically signed. Oct 12 2020  6:51PM    ROSSI HOBSON M.D., ATTENDING RADIOLOGIST  This document has been electronically signed. Oct 13 2020 10:05AM  Addend:ROSSI HOBSON M.D., ATTENDING RADIOLOGIST  This addendum was electronically signed on: Oct 13 2020 11:20AM.    Imaging  Reviewed:  YES    Consultant(s) Notes Reviewed:   YES      Plan of care was discussed with patient and /or primary care giver; all questions and concerns were addressed NP Note     Patient is a 80y old  Male who presents with a chief complaint of SYNCOPY (14 Oct 2020 09:54)    HPI:  Patient is 80y  from home, self-ambulating with PMH of chronic anemia, DM, chronic suprapubic catheter, chronic leg swelling, comes to the ED after a syncopal episode. Pt was in his usual state of health until 4 days ago when he first noted black tarry stool. He had a repeat episode the next day and has been feeling weak and lethargic since then.      Patient was admitted to medicine for systematic anemia and syncopal episode.  Patient received total of  3 U PRBC for low H&H (3.9/12.7), today H&H 8.1/23.4. 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 GI dr. Alarcon onboard, patient scheduled for EGD and colonoscopy for Thursday. Patient also is followed by heme/oncology for anemia, recommended f/u CBC q 6-8h and blood transfusion if hemoglobin drops <7.  Patient seen by nephrology dr. Forman for KENNY on admission which is improving (Cr 1.43 down from 1.84 on admission) most likely due to severe anemia. Cardiology consult obtain, pt seen by Dr. Sanchez echo results in mild (stage 1) diastolic disfunction, EF >55%. As per dr. Sanchez syncope mediated by blood loss and anemia secondary to GI bleeding. Patient assessed by nutritionist for potential malnutrition and has been determined to have a diagnosis/diagnoses of Severe protein-calorie malnutrition. Recommendation to advance diet with nutritional supplement as medically feasible.      Patient seen and examined at bedside.    INTERVAL HPI/OVERNIGHT EVENTS: no new complaints    Penile pain addressed with PRN lidocaine topical gel.      MEDICATIONS  (STANDING):  cefTRIAXone   IVPB      cefTRIAXone   IVPB 1000 milliGRAM(s) IV Intermittent every 24 hours  insulin lispro (HumaLOG) corrective regimen sliding scale   SubCutaneous three times a day before meals  iron sucrose IVPB 200 milliGRAM(s) IV Intermittent once  octreotide  Infusion 50 MICROgram(s)/Hr (10 mL/Hr) IV Continuous <Continuous>  pantoprazole  Injectable 40 milliGRAM(s) IV Push two times a day    MEDICATIONS  (PRN):  lidocaine 2% Gel 1 Application(s) Topical every 6 hours PRN moderate pain  morphine  - Injectable 2 milliGRAM(s) IV Push every 6 hours PRN Severe Pain (7 - 10)      __________________________________________________  REVIEW OF SYSTEMS:    CONSTITUTIONAL: No fever,   EYES: no acute visual disturbances  NECK: No pain or stiffness  RESPIRATORY: No cough; No shortness of breath  CARDIOVASCULAR: No chest pain, no palpitations  GASTROINTESTINAL: No pain. No nausea or vomiting; No diarrhea   NEUROLOGICAL:  No headache or numbness, no tremors  MUSCULOSKELETAL: No joint pain, no muscle pain  GENITOURINARY: no dysuria, no frequency, no hesitancy, suprapubic catheter  PSYCHIATRY: no depression , no anxiety  ALL OTHER  ROS negative        Vital Signs Last 24 Hrs  T(C): 36.3 (14 Oct 2020 05:07), Max: 36.8 (13 Oct 2020 16:47)  T(F): 97.4 (14 Oct 2020 05:07), Max: 98.2 (13 Oct 2020 16:47)  HR: 77 (14 Oct 2020 05:07) (77 - 98)  BP: 137/56 (14 Oct 2020 05:07) (119/50 - 141/54)  BP(mean): --  RR: 18 (14 Oct 2020 05:07) (18 - 18)  SpO2: 97% (14 Oct 2020 05:07) (97% - 100%)    ________________________________________________  PHYSICAL EXAM:  GENERAL: NAD  HEENT: Normocephalic;  conjunctivae and sclerae clear; moist mucous membranes;   NECK : supple  CHEST/LUNG: Clear to auscultation bilaterally with good air entry   HEART: S1 S2  regular; no murmurs, gallops or rubs  ABDOMEN: Soft, Nontender, distended; Bowel sounds present  EXTREMITIES: no cyanosis; +2 dependent edema; no calf tenderness  SKIN: warm and dry; no rash  NERVOUS SYSTEM:  Awake and alert; Oriented  to place, person and time ; no new deficits    _________________________________________________  LABS:                        7.5    5.18  )-----------( 234      ( 14 Oct 2020 06:37 )             22.0     10-14    134<L>  |  102  |  29<H>  ----------------------------<  205<H>  4.1   |  27  |  1.29    Ca    8.0<L>      14 Oct 2020 06:37  Phos  2.5     10-13  Mg     1.6     10-13    TPro  5.5<L>  /  Alb  2.1<L>  /  TBili  0.6  /  DBili  x   /  AST  21  /  ALT  17  /  AlkPhos  52  10-14    PT/INR - ( 13 Oct 2020 04:26 )   PT: 13.9 sec;   INR: 1.18 ratio         PTT - ( 13 Oct 2020 04:26 )  PTT:22.7 sec  Urinalysis Basic - ( 12 Oct 2020 17:24 )    Color: Yellow / Appearance: Slightly Turbid / S.020 / pH: x  Gluc: x / Ketone: Negative  / Bili: Negative / Urobili: Negative   Blood: x / Protein: 100 / Nitrite: Negative   Leuk Esterase: Moderate / RBC: 2-5 /HPF / WBC 26-50 /HPF   Sq Epi: x / Non Sq Epi: Occasional /HPF / Bacteria: Few /HPF      CAPILLARY BLOOD GLUCOSE      POCT Blood Glucose.: 239 mg/dL (14 Oct 2020 07:52)  POCT Blood Glucose.: 238 mg/dL (13 Oct 2020 21:45)  POCT Blood Glucose.: 199 mg/dL (13 Oct 2020 16:55)  POCT Blood Glucose.: 103 mg/dL (13 Oct 2020 12:02)        RADIOLOGY & ADDITIONAL TESTS:    EXAM:  US DPLX LWR EXT VEINS LTD LT                            PROCEDURE DATE:  10/13/2020          INTERPRETATION:  CLINICAL INFORMATION: Left lower extremity swelling    COMPARISON: None available.    TECHNIQUE: Duplex sonography of the LEFT LOWER extremity veins with color and spectral Doppler, with and without compression. Note that the left lung gastrocnemius veins are not visualized.    FINDINGS:    There is normal compressibility of the left common femoral, femoral and popliteal veins.  Doppler examination shows normal spontaneous and phasic flow.    No calf vein thrombosis is detected. Calf edema is present.    IMPRESSION:  No evidence of left lower extremity deep venous thrombosis.   Please note, there is limited visualization of the left calf veins due to calf edema.  Therefore, if  clinical concern persists, reevaluation can be performed after 5 to 7 days to evaluate for propagation of clot.      EXAM:  CT CHEST                            *** ADDENDUM 10/13/2020  ***    NP Mr. Del Toro is informed.    *** END OF ADDENDUM 10/13/2020  ***      PROCEDURE DATE:  10/13/2020          INTERPRETATION:  Chest CT without IV contrast    Indication: Dyspnea.    Technique: Axial multidetector CT images of the chest are acquired without IV contrast.    Comparison: No prior chest CT is available for comparison. Reference is made with a previous abdominal CT dated 10/12/2020.    Findings: Mild bilateral pleural effusions. Mild pericardial effusion. The heart is not enlarged. Aortic, coronary artery, and mitral annular calcifications are present. Mild ectasia of the ascending aorta at 3.7 cm in diameter. Allowing for the noncontrast technique, there isno grossly enlarged mediastinal, hilar, or axillary lymph node.    The trachea and central bronchi are patent. Small linear densities in the trachea, likely representing mucus secretion.    No evidence of pneumothorax, pulmonary consolidation or mass. Small calcified granuloma in the right upper lobe. A few small noncalcified nonspecific lung nodules in the upper lobes bilaterally measuring up to 5 mm in the left upper lobe (image 55 series 3); if the patient's is in the high risk category, follow-up chest CT may be pursued in 12 months to ensure stability. Small bilateral atelectasis.    Limited sections through the upper abdomen again demonstrate nodular contour of the liver, suggestive of cirrhosis. Cholelithiasis. Large ascites again noted. IV contrast excretion in the left collecting system from recent contrast abdominal CT.    Ankylosing spondylitis. Apparent nondisplaced acute horizontal fracture at the inferior T5 vertebra with involvement of the inferior endplate (image 96 series7 and image 87 series 9).    Impression: Apparent nondisplaced acute horizontal fracture at the inferior T5 vertebra with involvement of the inferior endplate.    A few small lung nodules in the upper lobes bilaterally; if the patient's is in the high risk category (i.e. smoker), follow-up chest CT may be pursued in 12 months to ensure stability.    No evidence of pneumothorax, pulmonary consolidation or mass.    Small linear densities in the trachea, likely representing mucus secretion.    Mild bilateral pleural effusions.    Mild pericardial effusion.    ***Please see the addendum at the top of this report. It may contain additional important information or changes.****        EXAM:  CT ANGIO ABD PELV (W)AW IC                            PROCEDURE DATE:  10/12/2020          INTERPRETATION:  CLINICAL INFORMATION: GI bleeding. Abdominal pain.    COMPARISON: None.    PROCEDURE:  CT of the Abdomen and Pelvis was performed with and without intravenous contrast.  Precontrast, Arterial and Delayed phases were performed.  Intravenous contrast: 90 ml Omnipaque 350. 10 ml discarded.  Oral contrast: None.  Sagittal and coronal reformats were performed.    FINDINGS:  LOWER CHEST:Trace bilateral pleural effusion and trace pericardial effusion.    LIVER: Shrunken liver with nodularity likely indicating cirrhosis.  BILE DUCTS: Normal caliber.  GALLBLADDER: Multiple gallstones without evidence of cholecystitis.  SPLEEN: Within normal limits.  PANCREAS: Within normal limits.  ADRENALS: Within normal limits.  KIDNEYS/URETERS: Right extrarenal pelvis with mild hydroureteronephrosis tracing to the level of the junction of the mid and distal ureter. No radiopaque stone is identified.    BLADDER: Suprapubic catheter in place. Collapsed.  REPRODUCTIVE ORGANS: Surgical clips. Prostatectomy?    BOWEL: No bowel obstruction. Appendix is not visualized.  PERITONEUM: Massive ascites. No hemoperitoneum.  VESSELS: No active extravasation. Prominent portal vein and superior mesenteric vein with small sized splenic vein. Extensive collateral veins around the stomach, spleen and mesentery in the left mid abdomen (19-and 90). Moderate atherosclerotic disease.  RETROPERITONEUM/LYMPH NODES: No lymphadenopathy.  ABDOMINAL WALL: Mild anasarca.  BONES: Degenerative changes.    IMPRESSION:  No active extravasation.    Cirrhotic liver with stigmata of portal hypertension with extensive left gastric, splenic and mesenteric varices.    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    Mild right hydroureteronephrosis without demonstratable obstructing lesions.    Multiple gallstones.    SARAH GUAJARDO M.D., ATTENDING RADIOLOGIST  This document has been electronically signed. Oct 12 2020  6:51PM    ROSSI HOBSON M.D., ATTENDING RADIOLOGIST  This document has been electronically signed. Oct 13 2020 10:05AM  Addend:ROSSI HOBSON M.D., ATTENDING RADIOLOGIST  This addendum was electronically signed on: Oct 13 2020 11:20AM.    Imaging  Reviewed:  YES    Consultant(s) Notes Reviewed:   YES      Plan of care was discussed with patient and /or primary care giver; all questions and concerns were addressed

## 2020-10-14 NOTE — CHART NOTE - TREATMENT: THE FOLLOWING DIET HAS BEEN RECOMMENDED
Advance diet with nutritional supplement as medically feasible     Diet, NPO after Midnight:      NPO Start Date: 14-Oct-2020,   NPO Start Time: 23:59 (10-14-20 @ 09:55) [Active]  Diet, Clear Liquid (10-13-20 @ 13:04) [Active]

## 2020-10-14 NOTE — PROGRESS NOTE ADULT - SUBJECTIVE AND OBJECTIVE BOX
[facility]   MD FIDELINA LEDESMA MD RUORU WONG, PA    TEL:  OFFICE: [phone]  DR CHAMBERS CELL: [phone]  RAMIRO HERNANDEZ CELL: [phone]  DR. THOMAS CELL: [phone]  DR. CRABTREE CELL: [phone]    FROM 5 PM - 7 AM PLEASE CALL ANSWERING SERVICE: [phone]    RENAL FOLLOW UP NOTE--Date of Service 10-14-20 @ 09:54  --------------------------------------------------------------------------------  HPI:      Pt seen and examined at bedside.       PAST HISTORY  --------------------------------------------------------------------------------  No significant changes to PMH, PSH, FHx, SHx, unless otherwise noted    ALLERGIES & MEDICATIONS  --------------------------------------------------------------------------------  Allergies    No Known Allergies    Intolerances      Standing Inpatient Medications  cefTRIAXone   IVPB      cefTRIAXone   IVPB 1000 milliGRAM(s) IV Intermittent every 24 hours  insulin lispro (HumaLOG) corrective regimen sliding scale   SubCutaneous three times a day before meals  iron sucrose IVPB 200 milliGRAM(s) IV Intermittent once  octreotide  Infusion 50 MICROgram(s)/Hr IV Continuous <Continuous>  pantoprazole  Injectable 40 milliGRAM(s) IV Push two times a day    PRN Inpatient Medications  morphine  - Injectable 2 milliGRAM(s) IV Push every 6 hours PRN      REVIEW OF SYSTEMS  --------------------------------------------------------------------------------  General: no fever  CVS: no chest pain  RESP: no sob, no cough  ABD: no abdominal pain  MSK: +edema     VITALS/PHYSICAL EXAM  --------------------------------------------------------------------------------  T(C): 36.3 (10-14-20 @ 05:07), Max: 36.8 (10-13-20 @ 16:47)  HR: 77 (10-14-20 @ 05:07) (77 - 98)  BP: 137/56 (10-14-20 @ 05:07) (119/50 - 141/54)  RR: 18 (10-14-20 @ 05:07) (18 - 18)  SpO2: 97% (10-14-20 @ 05:07) (97% - 100%)  Wt(kg): --  Height (cm): 182.9 (10-12-20 @ 15:51)  Weight (kg): 77.1 (10-12-20 @ 15:51)  BMI (kg/m2): 23 (10-12-20 @ 15:51)  BSA (m2): 1.99 (10-12-20 @ 15:51)      10-13-20 @ 07:01  -  10-14-20 @ 07:00  --------------------------------------------------------  IN: 0 mL / OUT: 775 mL / NET: -775 mL      Physical Exam:  	Gen: NAD  	HEENT: MMM  	Pulm: CTA B/L  	CV: S1S2  	Abd: Soft, +BS  	Ext: + LE edema B/L                      Neuro: Awake   	Skin: Warm and Dry   	Vascular access: no hd cathter           supra [facility] ccater  LABS/STUDIES  --------------------------------------------------------------------------------              7.5    5.18  >-----------<  234      [10-14-20 @ 06:37]              22.0     134  |  102  |  29  ----------------------------<  205      [10-14-20 @ 06:37]  4.1   |  27  |  1.29        Ca     8.0     [10-14-20 @ 06:37]      Mg     1.6     [10-13-20 @ 04:26]      Phos  2.5     [10-13-20 @ 04:26]    TPro  5.5  /  Alb  2.1  /  TBili  0.6  /  DBili  x   /  AST  21  /  ALT  17  /  AlkPhos  52  [10-14-20 @ 06:37]    PT/INR: PT 13.9 , INR 1.18       [10-13-20 @ 04:26]  PTT: 22.7       [10-13-20 @ 04:26]    Troponin <0.015      [10-13-20 @ 04:26]        [10-13-20 @ 17:52]    Creatinine Trend:  SCr 1.29 [10-14 @ 06:37]  SCr 1.43 [10-13 @ 04:26]  SCr 1.84 [10-12 @ 16:50]    Urinalysis - [10-12-20 @ 17:24]      Color Yellow / Appearance Slightly Turbid / SG 1.020 / pH 5.0      Gluc Negative / Ketone Negative  / Bili Negative / Urobili Negative       Blood Moderate / Protein 100 / Leuk Est Moderate / Nitrite Negative      RBC 2-5 / WBC 26-50 / Hyaline  / Gran  / Sq Epi  / Non Sq Epi Occasional / Bacteria Few      Iron 11, TIBC 316, %sat 4      [10-12-20 @ 16:50]  Ferritin 13      [10-13-20 @ 09:35]  TSH 6.58      [10-13-20 @ 04:26]  Lipid: chol 110, TG 89, HDL 25, LDL 67      [10-13-20 @ 04:26]    HBsAg Nonreact      [10-13-20 @ 11:55]  HCV 0.14, Nonreact      [10-13-20 @ 11:55]    Rheumatoid Factor <10      [10-14-20 @ 00:22]

## 2020-10-14 NOTE — PROGRESS NOTE ADULT - ASSESSMENT
80M from home, self-ambulating with PMH of chronic anemia, DM, chronic suprapubic catheter, chronic leg swelling, comes to the ED after a syncopal episode   nephrology consulted for IAN      Ian  scr 1.8 on admission   Ina likely sec to severe anemia (Hb ~3)  s/p 3 units pRBC   Renal function improved   s/p Ct A/P with contrast on 10/12-- Monitor for JACK  Monitor at present  Avoid further nephrotoxics, NSAIDs RCa    Proteinuria/ hematuria  in setting of UTI  Repeat UA after treatment    Right hydroureteronephrosis  Ct A/P with right hydroureteronephrosis  Recommend Urology evaluation    LE edema  Left doppler neg for DVT  Consider lasix

## 2020-10-14 NOTE — DIETITIAN INITIAL EVALUATION ADULT. - PERTINENT MEDS FT
MEDICATIONS  (STANDING):  cefTRIAXone   IVPB      cefTRIAXone   IVPB 1000 milliGRAM(s) IV Intermittent every 24 hours  insulin lispro (HumaLOG) corrective regimen sliding scale   SubCutaneous three times a day before meals  iron sucrose IVPB 200 milliGRAM(s) IV Intermittent once  octreotide  Infusion 50 MICROgram(s)/Hr (10 mL/Hr) IV Continuous <Continuous>  pantoprazole  Injectable 40 milliGRAM(s) IV Push two times a day

## 2020-10-14 NOTE — PROGRESS NOTE ADULT - ASSESSMENT
Assessment and Recommendation:   · Assessment	  80M from home, self-ambulating with PMH of chronic anemia, DM, chronic suprapubic catheter, chronic leg swelling, comes to the ED after a syncopal episode today. Pt was in his usual state of health until 4 days ago when he first noted black tarry stool. He had a repeat episode the nest day and has been feeling weak and lethargic since then. He called his doctor friends in [place] who suggested him to visit ED. Today morning when he got off from his bed he was noted to be dizzy and then fell down. Pt doesn't remember distinctly but thinks he had LOC. Pt denies any chest pain, shortness of breath, palpitations, N/V, Abd pain, diarrhea or any NSAID or blood thinner use.   Pt stays alone at home. 8 months ago his wife was diagnosed with Brain cancer and is admitted in the [facility].   Pt denies any smoking, alcohol or any form of substance abuse.     CT abd- Cirrohtic liver with portal HTN, varices, concerns for SVT     Hem/OncHx: Pt states Dx with Melanoma 2014, treated with resection only. Prostate Cancer Dx 2015, s/p radiation, no further tx given and in remission. Pt states he has long hx of anemia, but no prior transfusions. 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     Problem# Anemia  p/w syncope, melena x 4 days, weakness, fatigue and Hgb=3.9  s/p 3 units PRBC   Hgb stable at 7.5  Etiology: Acute GIB/FRANKLIN/Hypothyroidism/KENNY/Cirrhosis  CTA A/P- Cirrhotic liver with stigmata of portal hypertension with extensive left gastric, splenic and mesenteric varices. 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  Chest CT: nondisplaced acute horizontal fracture at the inferior T5 vertebra with involvement of the inferior endplate. A few small lung nodules in the upper lobes bilaterally; if the patient's is in the high risk category (i.e. smoker), follow-up chest CT may be pursued in 12 months to ensure stability. B/L mild Pl Ef and mild pericardial effusion  hepatitis panel (-)  B12/folate/RF nl  Ferritin=13, Iron Sat 4%  TSH=6.5, Cr=1.4, Retic 2.8%  VDB=516, Haptoglobin =155  abdominal distention with known cirrhosis  f/u ct chest to r/o any underlying malignancy   appreciate GI consult, plan for EGD/colonoscopy on 10/15  B/L LE edema L>R, doppler LLE (-) but limited visualization of veins due to edema, monitor  Rec's:  -CBC q12  -Continue Venofer 200mg QD x 4 days  -Abd US evaluate ascites, possible paracentesis  -Transfuse PRBC if Hgb <7.0 or if symptomatic  -await GI work-up, plan for scopes  -IV PPI     Problem# Syncope  EKG - NSR   trops x1 negative   Carotid and Echo ordered  mgmt as per Medicine and Cardiology Teams    Problem# VTE/GI Prophylaxis   Acute GIB, continue SCD  Continue PPI    Thank you for the consult. Will continue to monitor the patient.  Please call with any questions [phone]  Above reviewed with Attending Dr. Danielle     Assessment and Recommendation:   · Assessment	  80M from home, self-ambulating with PMH of chronic anemia, DM, chronic suprapubic catheter, chronic leg swelling, comes to the ED after a syncopal episode today. Pt was in his usual state of health until 4 days ago when he first noted black tarry stool. He had a repeat episode the nest day and has been feeling weak and lethargic since then. He called his doctor friends in [place] who suggested him to visit ED. Today morning when he got off from his bed he was noted to be dizzy and then fell down. Pt doesn't remember distinctly but thinks he had LOC. Pt denies any chest pain, shortness of breath, palpitations, N/V, Abd pain, diarrhea or any NSAID or blood thinner use.   Pt stays alone at home. 8 months ago his wife was diagnosed with Brain cancer and is admitted in the [facility].   Pt denies any smoking, alcohol or any form of substance abuse.     CT abd- Cirrohtic liver with portal HTN, varices, concerns for SVT     Hem/OncHx: Pt states Dx with Melanoma 2014, treated with resection only. Prostate Cancer Dx 2015, s/p radiation, no further tx given and in remission. Pt states he has long hx of anemia, but no prior transfusions. Dx with cirrhosis 2017 due to Hepatitis and follows with Hepatology at [facility], on ?lasix, paracentesis approx q 6 months.     Problem# Anemia  p/w syncope, melena x 4 days, weakness, fatigue and Hgb=3.9  s/p 3 units PRBC   Hgb stable at 7.5  Etiology: Acute GIB/FRANKLIN/Hypothyroidism/KENNY/Cirrhosis  CTA A/P- Cirrhotic liver with stigmata of portal hypertension with extensive left gastric, splenic and mesenteric varices. 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  Chest CT: nondisplaced acute horizontal fracture at the inferior T5 vertebra with involvement of the inferior endplate. A few small lung nodules in the upper lobes bilaterally; if the patient's is in the high risk category (i.e. smoker), follow-up chest CT may be pursued in 12 months to ensure stability. B/L mild Pl Ef and mild pericardial effusion  hepatitis panel (-)  B12/folate/RF nl  Ferritin=13, Iron Sat 4%  TSH=6.5, Cr=1.4, Retic 2.8%  MTY=744, Haptoglobin =155  abdominal distention with known cirrhosis  f/u ct chest to r/o any underlying malignancy   appreciate GI consult, plan for EGD/colonoscopy on 10/15  B/L LE edema L>R, doppler LLE (-) but limited visualization of veins due to edema, monitor  Rec's:  -CBC q12  -Continue Venofer 200mg QD x 4 days  -Abd US evaluate ascites, possible paracentesis  -Transfuse PRBC if Hgb <7.0 or if symptomatic  -await GI work-up, plan for scopes  -IV PPI     Problem# Syncope  EKG - NSR   trops x1 negative   Carotid - 50-69% right ICA diameter stenosis.  Echo -EF 55%  mgmt as per Medicine and Cardiology Teams    Problem# Penile Pain  ? joya placement?  morphine and lidocaine cream not helpful  pain mgmt as per Medicine Team    Problem# VTE/GI Prophylaxis   Acute GIB, continue SCD  Continue PPI    Thank you for the consult. Will continue to monitor the patient.  Please call with any questions [phone]  Above reviewed with Attending Dr. Danielle     Assessment and Recommendation:   · Assessment	  80M from home, self-ambulating with PMH of chronic anemia, DM, chronic suprapubic catheter, chronic leg swelling, comes to the ED after a syncopal episode today. Pt was in his usual state of health until 4 days ago when he first noted black tarry stool. He had a repeat episode the nest day and has been feeling weak and lethargic since then. He called his doctor friends in [place] who suggested him to visit ED. Today morning when he got off from his bed he was noted to be dizzy and then fell down. Pt doesn't remember distinctly but thinks he had LOC. Pt denies any chest pain, shortness of breath, palpitations, N/V, Abd pain, diarrhea or any NSAID or blood thinner use.   Pt stays alone at home. 8 months ago his wife was diagnosed with Brain cancer and is admitted in the [facility].   Pt denies any smoking, alcohol or any form of substance abuse.     CT abd- Cirrohtic liver with portal HTN, varices, concerns for SVT     Hem/OncHx: Pt states Dx with Melanoma 2014, treated with resection only. Prostate Cancer Dx 2015, s/p radiation, no further tx given and in remission. Pt states he has long hx of anemia, but no prior transfusions. 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     Problem# SEVERE  Anemia  p/w syncope, melena x 4 days, weakness, fatigue and Hgb=3.9  s/p 3 units PRBC   Hgb stable at 7.5  Etiology: Acute GIB/FRANKLIN/Hypothyroidism/KENNY/Cirrhosis  CTA A/P- Cirrhotic liver with stigmata of portal hypertension with extensive left gastric, splenic and mesenteric varices. 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  Chest CT: nondisplaced acute horizontal fracture at the inferior T5 vertebra with involvement of the inferior endplate. A few small lung nodules in the upper lobes bilaterally; if the patient's is in the high risk category (i.e. smoker), follow-up chest CT may be pursued in 12 months to ensure stability. B/L mild Pl Ef and mild pericardial effusion  hepatitis panel (-)  B12/folate/RF nl  Ferritin=13, Iron Sat 4%  TSH=6.5, Cr=1.4, Retic 2.8%  IBD=132, Haptoglobin =155  abdominal distention with known cirrhosis  f/u ct chest to r/o any underlying malignancy   appreciate GI consult, plan for EGD/colonoscopy on 10/15  B/L LE edema L>R, doppler LLE (-) but limited visualization of veins due to edema, monitor  Rec's:  -CBC q12  -Continue Venofer 200mg QD x 4 days  -Abd US evaluate ascites, possible paracentesis  -Transfuse PRBC if Hgb <7.0 or if symptomatic  -await GI work-up, plan for scopes  -IV PPI     Problem# Syncope  EKG - NSR   trops x1 negative   Carotid - 50-69% right ICA diameter stenosis.  Echo -EF 55%  mgmt as per Medicine and Cardiology Teams    Problem# Penile Pain  ? joya placement?  morphine and lidocaine cream not helpful  pain mgmt as per Medicine Team    Problem# VTE/GI Prophylaxis   Acute GIB, continue SCD  Continue PPI    Thank you for the consult. Will continue to monitor the patient.  Please call with any questions [phone]  Above reviewed with Attending Dr. Danielle

## 2020-10-14 NOTE — PROGRESS NOTE ADULT - PROBLEM SELECTOR PLAN 3
Patient has PMH of liver cirrhosis  CT- Cirrhotic liver with portal HTN, varices, concerns for SVT  oncology [facility] group onboard  GI dr. Alarcon onboard  EGD on Thursday  f/u recommendation.

## 2020-10-14 NOTE — PROGRESS NOTE ADULT - PROBLEM SELECTOR PLAN 1
Pt admitted for syncopal episode, most likely 2/2 gi bleeding  Pt had melena x 4 days prior to admission, no active bleeding in hospital  No prior episodes before.  CT abd- Cirrhotic liver with portal HTN, varices, concerns for SVT   GI dr. Alarcon  EGD and colonoscopy on Thursday  Hb on admission 3.8   s/p 3U prbc (H&H 8.1/23.4)  Venofer 200mg IVPB daily x 4 days  Octreotide 500ug daily x 5 days  Will f/u CBC q 6-8h  hepatitis panel negative  ct chest  nondisplaced acute horizontal fracture at the inferior T5   ortho consulted, no intervention at this time  Hematology- [facility]. Pt admitted for syncopal episode, most likely 2/2 gi bleeding  Pt had melena x 4 days prior to admission, no active bleeding in hospital  No prior episodes before.  CT abd- Cirrhotic liver with portal HTN, varices, concerns for SVT   GI dr. Alarcon  EGD and colonoscopy on Thursday  Hb on admission 3.8   s/p 3U prbc (H&H 7.5/22.00) slight decrease from Tuesday  Venofer 200mg IVPB daily x 4 days  Octreotide 500ug daily x 5 days  Will f/u CBC q 6-8h  hepatitis panel negative  ct chest  nondisplaced acute horizontal fracture at the inferior T5   ortho consulted, no intervention at this time  Hematology- [facility].

## 2020-10-15 ENCOUNTER — RESULT REVIEW (OUTPATIENT)
Age: 80
End: 2020-10-15

## 2020-10-15 ENCOUNTER — TRANSCRIPTION ENCOUNTER (OUTPATIENT)
Age: 80
End: 2020-10-15

## 2020-10-15 LAB
-  AMIKACIN: SIGNIFICANT CHANGE UP
-  AZTREONAM: SIGNIFICANT CHANGE UP
-  CEFEPIME: SIGNIFICANT CHANGE UP
-  CEFTAZIDIME: SIGNIFICANT CHANGE UP
-  CIPROFLOXACIN: SIGNIFICANT CHANGE UP
-  GENTAMICIN: SIGNIFICANT CHANGE UP
-  IMIPENEM: SIGNIFICANT CHANGE UP
-  LEVOFLOXACIN: SIGNIFICANT CHANGE UP
-  MEROPENEM: SIGNIFICANT CHANGE UP
-  PIPERACILLIN/TAZOBACTAM: SIGNIFICANT CHANGE UP
-  TOBRAMYCIN: SIGNIFICANT CHANGE UP
ALBUMIN SERPL ELPH-MCNC: 2.2 G/DL — LOW (ref 3.5–5)
ALP SERPL-CCNC: 56 U/L — SIGNIFICANT CHANGE UP (ref 40–120)
ALT FLD-CCNC: 18 U/L DA — SIGNIFICANT CHANGE UP (ref 10–60)
ANION GAP SERPL CALC-SCNC: 5 MMOL/L — SIGNIFICANT CHANGE UP (ref 5–17)
AST SERPL-CCNC: 20 U/L — SIGNIFICANT CHANGE UP (ref 10–40)
BILIRUB SERPL-MCNC: 0.6 MG/DL — SIGNIFICANT CHANGE UP (ref 0.2–1.2)
BUN SERPL-MCNC: 17 MG/DL — SIGNIFICANT CHANGE UP (ref 7–18)
CALCIUM SERPL-MCNC: 8 MG/DL — LOW (ref 8.4–10.5)
CHLORIDE SERPL-SCNC: 99 MMOL/L — SIGNIFICANT CHANGE UP (ref 96–108)
CO2 SERPL-SCNC: 29 MMOL/L — SIGNIFICANT CHANGE UP (ref 22–31)
CREAT SERPL-MCNC: 1.16 MG/DL — SIGNIFICANT CHANGE UP (ref 0.5–1.3)
GLUCOSE BLDC GLUCOMTR-MCNC: 154 MG/DL — HIGH (ref 70–99)
GLUCOSE BLDC GLUCOMTR-MCNC: 183 MG/DL — HIGH (ref 70–99)
GLUCOSE BLDC GLUCOMTR-MCNC: 203 MG/DL — HIGH (ref 70–99)
GLUCOSE BLDC GLUCOMTR-MCNC: 225 MG/DL — HIGH (ref 70–99)
GLUCOSE BLDC GLUCOMTR-MCNC: 250 MG/DL — HIGH (ref 70–99)
GLUCOSE BLDC GLUCOMTR-MCNC: 283 MG/DL — HIGH (ref 70–99)
GLUCOSE SERPL-MCNC: 179 MG/DL — HIGH (ref 70–99)
HCT VFR BLD CALC: 24.3 % — LOW (ref 39–50)
HGB BLD-MCNC: 8.2 G/DL — LOW (ref 13–17)
MCHC RBC-ENTMCNC: 27.5 PG — SIGNIFICANT CHANGE UP (ref 27–34)
MCHC RBC-ENTMCNC: 33.7 GM/DL — SIGNIFICANT CHANGE UP (ref 32–36)
MCV RBC AUTO: 81.5 FL — SIGNIFICANT CHANGE UP (ref 80–100)
METHOD TYPE: SIGNIFICANT CHANGE UP
NRBC # BLD: 0 /100 WBCS — SIGNIFICANT CHANGE UP (ref 0–0)
PLATELET # BLD AUTO: 256 K/UL — SIGNIFICANT CHANGE UP (ref 150–400)
POTASSIUM SERPL-MCNC: 4.1 MMOL/L — SIGNIFICANT CHANGE UP (ref 3.5–5.3)
POTASSIUM SERPL-SCNC: 4.1 MMOL/L — SIGNIFICANT CHANGE UP (ref 3.5–5.3)
PROT SERPL-MCNC: 5.8 G/DL — LOW (ref 6–8.3)
RBC # BLD: 2.98 M/UL — LOW (ref 4.2–5.8)
RBC # FLD: 17.2 % — HIGH (ref 10.3–14.5)
SODIUM SERPL-SCNC: 133 MMOL/L — LOW (ref 135–145)
WBC # BLD: 6.99 K/UL — SIGNIFICANT CHANGE UP (ref 3.8–10.5)
WBC # FLD AUTO: 6.99 K/UL — SIGNIFICANT CHANGE UP (ref 3.8–10.5)

## 2020-10-15 PROCEDURE — 88305 TISSUE EXAM BY PATHOLOGIST: CPT | Mod: 26

## 2020-10-15 PROCEDURE — 76705 ECHO EXAM OF ABDOMEN: CPT | Mod: 26

## 2020-10-15 PROCEDURE — 88312 SPECIAL STAINS GROUP 1: CPT | Mod: 26

## 2020-10-15 RX ORDER — IRON SUCROSE 20 MG/ML
200 INJECTION, SOLUTION INTRAVENOUS ONCE
Refills: 0 | Status: COMPLETED | OUTPATIENT
Start: 2020-10-15 | End: 2020-10-16

## 2020-10-15 RX ORDER — MEROPENEM 1 G/30ML
500 INJECTION INTRAVENOUS EVERY 8 HOURS
Refills: 0 | Status: DISCONTINUED | OUTPATIENT
Start: 2020-10-15 | End: 2020-10-15

## 2020-10-15 RX ORDER — MEROPENEM 1 G/30ML
INJECTION INTRAVENOUS
Refills: 0 | Status: DISCONTINUED | OUTPATIENT
Start: 2020-10-15 | End: 2020-10-15

## 2020-10-15 RX ORDER — MEROPENEM 1 G/30ML
500 INJECTION INTRAVENOUS ONCE
Refills: 0 | Status: COMPLETED | OUTPATIENT
Start: 2020-10-15 | End: 2020-10-15

## 2020-10-15 RX ORDER — INSULIN LISPRO 100/ML
VIAL (ML) SUBCUTANEOUS
Refills: 0 | Status: DISCONTINUED | OUTPATIENT
Start: 2020-10-15 | End: 2020-10-17

## 2020-10-15 RX ORDER — INSULIN LISPRO 100/ML
VIAL (ML) SUBCUTANEOUS EVERY 6 HOURS
Refills: 0 | Status: DISCONTINUED | OUTPATIENT
Start: 2020-10-15 | End: 2020-10-15

## 2020-10-15 RX ADMIN — OCTREOTIDE ACETATE 10 MICROGRAM(S)/HR: 200 INJECTION, SOLUTION INTRAVENOUS; SUBCUTANEOUS at 01:51

## 2020-10-15 RX ADMIN — MEROPENEM 100 MILLIGRAM(S): 1 INJECTION INTRAVENOUS at 12:13

## 2020-10-15 RX ADMIN — PANTOPRAZOLE SODIUM 40 MILLIGRAM(S): 20 TABLET, DELAYED RELEASE ORAL at 06:12

## 2020-10-15 RX ADMIN — PANTOPRAZOLE SODIUM 40 MILLIGRAM(S): 20 TABLET, DELAYED RELEASE ORAL at 17:47

## 2020-10-15 RX ADMIN — Medication 2: at 21:43

## 2020-10-15 RX ADMIN — Medication 1: at 12:13

## 2020-10-15 RX ADMIN — Medication 2: at 06:25

## 2020-10-15 RX ADMIN — Medication 3: at 17:47

## 2020-10-15 NOTE — PROGRESS NOTE ADULT - PROBLEM SELECTOR PLAN 1
Pt admitted for syncopal episode, most likely 2/2 gi bleeding  Pt had melena x 4 days prior to admission, no active bleeding in hospital  No prior episodes before.  CT abd- Cirrhotic liver with portal HTN, varices, concerns for SVT   GI dr. Alarcon  EGD and colonoscopy done  Hb on admission 3.8   s/p 3U prbc (H&H 7.5/22.00) slight decrease from Tuesday  Venofer 200mg IVPB daily x 4 days  f/u CBC in AM  hepatitis panel negative  ct chest  nondisplaced acute horizontal fracture at the inferior T5   ortho consulted, no intervention at this time  Hematology- [facility].

## 2020-10-15 NOTE — DISCHARGE NOTE PROVIDER - PROVIDER TOKENS
PROVIDER:[TOKEN:[[id number]]] PROVIDER:[TOKEN:[[id number]]],PROVIDER:[TOKEN:[[id number]]]

## 2020-10-15 NOTE — PROGRESS NOTE ADULT - PROBLEM SELECTOR PLAN 3
Patient has PMH of liver cirrhosis  CT- Cirrhotic liver with portal HTN, varices, concerns for SVT  oncology [facility] onboard  GI dr. Alarcon onboard  EGD and Colonoscopy done pending results.  f/u recommendation.

## 2020-10-15 NOTE — PROGRESS NOTE ADULT - ASSESSMENT
80M from home, self-ambulating with PMH of chronic anemia, DM, chronic suprapubic catheter, chronic leg swelling, comes to the ED after a syncopal episode   nephrology consulted for IAN      Ian  scr 1.8 on admission   Ian likely sec to severe anemia (Hb ~3)  s/p 3 units pRBC   Renal function improved   s/p Ct A/P with contrast on 10/12-- Monitor for JACK  Monitor at present  Avoid further nephrotoxics, NSAIDs RCa    Proteinuria/ hematuria  in setting of UTI  Repeat UA after treatment    Right hydroureteronephrosis  Ct A/P with right hydroureteronephrosis  Recommend Urology evaluation    LE edema  Left doppler neg for DVT  ECHo EF >55  Consider lasix

## 2020-10-15 NOTE — PROGRESS NOTE ADULT - ASSESSMENT
Assessment and Recommendation:   · Assessment	  80M from home, self-ambulating with PMH of chronic anemia, DM, chronic suprapubic catheter, chronic leg swelling, comes to the ED after a syncopal episode today. Pt was in his usual state of health until 4 days ago when he first noted black tarry stool. He had a repeat episode the nest day and has been feeling weak and lethargic since then. He called his doctor friends in [place] who suggested him to visit ED. Today morning when he got off from his bed he was noted to be dizzy and then fell down. Pt doesn't remember distinctly but thinks he had LOC. Pt denies any chest pain, shortness of breath, palpitations, N/V, Abd pain, diarrhea or any NSAID or blood thinner use.   Pt stays alone at home. 8 months ago his wife was diagnosed with Brain cancer and is admitted in the [facility].   Pt denies any smoking, alcohol or any form of substance abuse.     CT abd- Cirrohtic liver with portal HTN, varices, concerns for SVT     Hem/OncHx: Pt states Dx with Melanoma 2014, treated with resection only. Prostate Cancer Dx 2015, s/p radiation, no further tx given and in remission. Pt states he has long hx of anemia, but no prior transfusions. Dx with cirrhosis 2017 due to Hepatitis and follows with Hepatology at [facility], on ?lasix, paracentesis approx q 6 months.     Problem# SEVERE  Anemia  p/w syncope, melena x 4 days, weakness, fatigue and Hgb=3.9  s/p 3 units PRBC   Hgb improved to 8.2  Etiology: Acute GIB/FRANKLIN/Hypothyroidism/KENNY/Cirrhosis  CTA A/P- Cirrhotic liver with stigmata of portal hypertension with extensive left gastric, splenic and mesenteric varices. 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  Chest CT: nondisplaced acute horizontal fracture at the inferior T5 vertebra with involvement of the inferior endplate. A few small lung nodules in the upper lobes bilaterally; if the patient's is in the high risk category (i.e. smoker), follow-up chest CT may be pursued in 12 months to ensure stability. B/L mild Pl Ef and mild pericardial effusion  hepatitis panel (-)  B12/folate/RF nl  Ferritin=13, Iron Sat 4%  TSH=6.5, Cr=1.4, Retic 2.8%  SKM=770, Haptoglobin =155  abdominal distention with known cirrhosis  appreciate GI consult, s/p EGD/colonoscopy on 10/14 which showed esophageal varix, gastritis, Bx taken  B/L LE edema L>R, doppler LLE (-) but limited visualization of veins due to edema, monitor  Rec's:  -CBC daily  -Continue Venofer 200mg QD x 4 days  -Abd US evaluate ascites, possible paracentesis  -Transfuse PRBC if Hgb <7.0 or if symptomatic  -IV PPI     Problem# Syncope  EKG - NSR   trops x1 negative   Carotid - 50-69% right ICA diameter stenosis.  Echo -EF 55%  mgmt as per Medicine and Cardiology Teams    Problem# Penile Pain  ? joya placement?  morphine and lidocaine cream not helpful  pain mgmt as per Medicine Team  evaluated by Urology and SPT exchanged    Problem# VTE/GI Prophylaxis   Acute GIB, continue SCD  Continue PPI    Thank you for the consult. Will continue to monitor the patient.  Please call with any questions [phone]  Above reviewed with Attending Dr. Castaneda     Assessment and Recommendation:   · Assessment	  80M from home, self-ambulating with PMH of chronic anemia, DM, chronic suprapubic catheter, chronic leg swelling, comes to the ED after a syncopal episode today. Pt was in his usual state of health until 4 days ago when he first noted black tarry stool. He had a repeat episode the nest day and has been feeling weak and lethargic since then. He called his doctor friends in [place] who suggested him to visit ED. Today morning when he got off from his bed he was noted to be dizzy and then fell down. Pt doesn't remember distinctly but thinks he had LOC. Pt denies any chest pain, shortness of breath, palpitations, N/V, Abd pain, diarrhea or any NSAID or blood thinner use.   Pt stays alone at home. 8 months ago his wife was diagnosed with Brain cancer and is admitted in the [facility].   Pt denies any smoking, alcohol or any form of substance abuse.     CT abd- Cirrohtic liver with portal HTN, varices, concerns for SVT     Hem/OncHx: Pt states Dx with Melanoma 2014, treated with resection only. Prostate Cancer Dx 2015, s/p radiation, no further tx given and in remission. Pt states he has long hx of anemia, but no prior transfusions. Dx with cirrhosis 2017 due to Hepatitis and follows with Hepatology at [facility], on ?lasix, paracentesis approx q 6 months.     Problem# SEVERE  Anemia  p/w syncope, melena x 4 days, weakness, fatigue and Hgb=3.9  s/p 3 units PRBC   Hgb improved to 8.2  Etiology: Acute GIB/FRANKLIN/Hypothyroidism/KENNY/Cirrhosis  CTA A/P- Cirrhotic liver with stigmata of portal hypertension with extensive left gastric, splenic and mesenteric varices. 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  Chest CT: nondisplaced acute horizontal fracture at the inferior T5 vertebra with involvement of the inferior endplate. A few small lung nodules in the upper lobes bilaterally; if the patient's is in the high risk category (i.e. smoker), follow-up chest CT may be pursued in 12 months to ensure stability. B/L mild Pl Ef and mild pericardial effusion  hepatitis panel (-)  B12/folate/RF nl  Ferritin=13, Iron Sat 4%  TSH=6.5, Cr=1.4, Retic 2.8%  HGH=357, Haptoglobin =155  abdominal distention with known cirrhosis  appreciate GI consult, s/p EGD/colonoscopy on 10/14 which showed esophageal varix, gastritis, Bx taken  B/L LE edema L>R, doppler LLE (-) but limited visualization of veins due to edema, monitor  Rec's:  -CBC daily  -Continue Venofer 200mg QD x 4 days  -Abd US evaluate ascites, possible paracentesis  -Transfuse PRBC if Hgb <7.0 or if symptomatic  -Upon discharge pt should f/u with Hematologist Dr. Hinojosa in 1-2 weeks    Problem# Syncope  EKG - NSR   trops x1 negative   Carotid - 50-69% right ICA diameter stenosis.  Echo -EF 55%  mgmt as per Medicine and Cardiology Teams    Problem# Penile Pain  ? joya placement?  morphine and lidocaine cream not helpful  pain mgmt as per Medicine Team  evaluated by Urology and SPT exchanged    Problem# VTE/GI Prophylaxis   Acute GIB, continue SCD  Continue PPI    Thank you for the consult. Will continue to monitor the patient.  Please call with any questions [phone]  Above reviewed with Attending Dr. Castaneda

## 2020-10-15 NOTE — CONSULT NOTE ADULT - ASSESSMENT
80 y.o. M with chronic SPT    -18Fr SPT to be changed at bedside  -Pt to f/u with own urologist for scheduled appointments and exchanges

## 2020-10-15 NOTE — CHART NOTE - NSCHARTNOTEFT_GEN_A_CORE
Colonoscopy Report  Indication: Anemia   Referring:    Instrument:    Anesthesia: MAC  Consent:  Informed consent was obtained from the patient after providing any opportunity for questions  Procedure: After placing the patient in the left lateral decubitus position, the colonoscope was gently inserted into the rectum and advanced to the cecum. Color, texture, mucosa, and anatomy of the colon were carefully examined with the scope. The patient tolerated the procedure well. After completion of the exam, the patient was transferred to the recovery room.     Preparation: Poor . BBPS R=1/2 T= 1/2 L=2   Findings:   Anal Canal	Normal  Rectum	Normal  Sigmoid Colon 	Normal  Descending Colon	Normal  Splenic Flexure	Normal  Transverse Colon	Normal  Hepatic Flexure	Normal  Ascending Colon	 Normal  Cecum	Normal  Ileo-cecal Valve	Normal  Ileum 	    EBL:0    Impression:  1- Poor prep. 2- No gross lesions 3- No evidence of bleeding     Recommendations: Refer to endoscopy report for further recommendations           Attending:       Odell Alarcon M.D.   _____________________________________________________________________________________________                                                                               Esophagogastroduodenoscopy Report      Indication: Anemia  Referring MD:   Anesthesia: MAC  Consent:  Informed consent was obtained from the patient after providing any opportunity for questions  Procedure: The gastroscope was gently passed through the incisoral orifice into the oral cavity and under direct visualization the esophagus was intubated. The endoscope was passed down the esophagus, through the stomach and into duodenum . Color, texture, mucosa and anatomy of the esophagus, stomach, and duodenum were carefully examined with the scope. The patient tolerated the procedure well. After completion of the examination, the patient was transferred to the recovery room.   Preparation: NPO   Findings:     Oropharynx	Normal    Esophagus	One large row of esophageal varix. No high risk stigmata of bleeding     EG-junction	Normal    Cardia:	Normal.    Body:	Normal     Antrum:	Mild gastritis biopsy taken [1]    Pylorus:	Normal.    Duodenal Bulb:	Normal     2nd portion:	Normal    3rd portion:	Normal.    EBL:0      Impression: 1- Esophageal varix.  2-No evidence of bleeding     Plan: 1-  Resume diet and medications 2-Start nadolol daily 3- D/C  octreotide             Attending:       Odell Alarcon M.D.   10-15-20 @ 08:22      PLEASE REFER TO PAPER CHART FOR PICTURES

## 2020-10-15 NOTE — PROGRESS NOTE ADULT - PROBLEM SELECTOR PLAN 4
Pt has PMH of DM   Home meds Metformin 1000mg   Regular diet with nutritional supplement  Humalog sliding scale as per protocol  monitor FS.

## 2020-10-15 NOTE — DISCHARGE NOTE PROVIDER - NSDCMRMEDTOKEN_GEN_ALL_CORE_FT
Lasix 40 mg oral tablet: 1 tab(s) orally 3 times a week  metFORMIN 1000 mg oral tablet: 1 tab(s) orally 2 times a day   Lasix 40 mg oral tablet: 1 tab(s) orally 3 times a week  metFORMIN 1000 mg oral tablet: 1 tab(s) orally 2 times a day  nadolol 40 mg oral tablet: 1 tab(s) orally once a day   nadolol 40 mg oral tablet: 1 tab(s) orally once a day MDD:2  traMADol 50 mg oral tablet: 1 tab(s) orally 2 times a day, As Needed MDD:2   Lasix 40 mg oral tablet: 1 tab(s) orally 3 times a week  metFORMIN 1000 mg oral tablet: 1 tab(s) orally 2 times a day  nadolol 40 mg oral tablet: 1 tab(s) orally once a day MDD:2  traMADol 50 mg oral tablet: 1 tab(s) orally 2 times a day, As Needed MDD:2

## 2020-10-15 NOTE — CONSULT NOTE ADULT - SUBJECTIVE AND OBJECTIVE BOX
Patient is a 80y old  Male who presents with a chief complaint of SYNCOPY (15 Oct 2020 11:11)      HPI  Called see and eval 80y.o. Male w/PMH significant for urinary retention secondary to BPH for SPT exchange. Pt admitted 10/12/2020 for syncopal episode. Pt has chronic SPT which was due for exchange yesterday by Dr. Meade. Pt states he had failed TURPs in the past and did not want more surgical intervention so SPT was placed in May this year. SPT has been changed in the office monthly. Currently resting comfortably. SPT with analia UO.    PAST MEDICAL & SURGICAL HISTORY:  Anemia    DM (diabetes mellitus)    MEDICATIONS  (STANDING):  insulin lispro (HumaLOG) corrective regimen sliding scale   SubCutaneous every 6 hours  iron sucrose IVPB 200 milliGRAM(s) IV Intermittent once  meropenem  IVPB      meropenem  IVPB 500 milliGRAM(s) IV Intermittent every 8 hours  pantoprazole  Injectable 40 milliGRAM(s) IV Push two times a day    MEDICATIONS  (PRN):  lidocaine 2% Gel 1 Application(s) Topical every 6 hours PRN moderate pain  melatonin 5 milliGRAM(s) Oral at bedtime PRN Sleep  morphine  - Injectable 2 milliGRAM(s) IV Push every 6 hours PRN Severe Pain (7 - 10)      Allergies    No Known Allergies    Vital Signs Last 24 Hrs  T(C): 36.9 (15 Oct 2020 05:15), Max: 36.9 (14 Oct 2020 13:39)  T(F): 98.4 (15 Oct 2020 05:15), Max: 98.4 (14 Oct 2020 13:39)  HR: 84 (15 Oct 2020 05:15) (79 - 92)  BP: 128/53 (15 Oct 2020 05:15) (128/53 - 147/68)  BP(mean): --  RR: 18 (15 Oct 2020 05:15) (16 - 18)  SpO2: 100% (15 Oct 2020 05:15) (100% - 100%)    Physical:  Gen: A&Ox3. NAD  Abd: Soft ND, NT. SPT site clean, dry.    I&O's Detail    14 Oct 2020 07:01  -  15 Oct 2020 07:00  --------------------------------------------------------  IN:  Total IN: 0 mL    OUT:    Indwelling Catheter - Suprapubic (mL): 1225 mL  Total OUT: 1225 mL    Total NET: -1225 mL    LABS:                        8.2    6.99  )-----------( 256      ( 15 Oct 2020 06:42 )             24.3              10-15    133<L>  |  99  |  17  ----------------------------<  179<H>  4.1   |  29  |  1.16    Ca    8.0<L>      15 Oct 2020 06:42    TPro  5.8<L>  /  Alb  2.2<L>  /  TBili  0.6  /  DBili  x   /  AST  20  /  ALT  18  /  AlkPhos  56  10-15              RADIOLOGY & ADDITIONAL STUDIES:  < from: CT Angio Abdomen and Pelvis w/ IV Cont (10.12.20 @ 18:15) >  KIDNEYS/URETERS: Right extrarenal pelvis with mild hydroureteronephrosis tracing to the level of the junction of the mid and distal ureter. No radiopaque stone is identified.    BLADDER: Suprapubic catheter in place. Collapsed.  REPRODUCTIVE ORGANS: Surgical clips. Prostatectomy?    < end of copied text >

## 2020-10-15 NOTE — PHYSICAL THERAPY INITIAL EVALUATION ADULT - CRITERIA FOR SKILLED THERAPEUTIC INTERVENTIONS
functional limitations in following categories/impairments found/rehab potential/therapy frequency/anticipated equipment needs at discharge/risk reduction/prevention/anticipated discharge recommendation/predicted duration of therapy intervention

## 2020-10-15 NOTE — DISCHARGE NOTE PROVIDER - CARE PROVIDERS DIRECT ADDRESSES
,DirectAddress_Unknown ,DirectAddress_Unknown,[email or web address]

## 2020-10-15 NOTE — PROGRESS NOTE ADULT - SUBJECTIVE AND OBJECTIVE BOX
[facility] [place]   MD FIDELINA LEDESMA MD RUORU WONG, PA    TEL:  OFFICE: [phone]  DR CHAMBERS CELL: [phone]  RAMIRO HERNANDEZ CELL: [phone]  DR. THOMAS CELL: [phone]  DR. CRABTREE CELL: [phone]    FROM 5 PM - 7 AM PLEASE CALL ANSWERING SERVICE: [phone]    RENAL FOLLOW UP NOTE--Date of Service 10-15-20 @ 11:11  --------------------------------------------------------------------------------  HPI:      Pt seen and examined at bedside.   Denies SOB, chest pain     PAST HISTORY  --------------------------------------------------------------------------------  No significant changes to PMH, PSH, FHx, SHx, unless otherwise noted    ALLERGIES & MEDICATIONS  --------------------------------------------------------------------------------  Allergies    No Known Allergies    Intolerances      Standing Inpatient Medications  insulin lispro (HumaLOG) corrective regimen sliding scale   SubCutaneous every 6 hours  meropenem  IVPB      meropenem  IVPB 500 milliGRAM(s) IV Intermittent once  meropenem  IVPB 500 milliGRAM(s) IV Intermittent every 8 hours  octreotide  Infusion 50 MICROgram(s)/Hr IV Continuous <Continuous>  pantoprazole  Injectable 40 milliGRAM(s) IV Push two times a day    PRN Inpatient Medications  lidocaine 2% Gel 1 Application(s) Topical every 6 hours PRN  melatonin 5 milliGRAM(s) Oral at bedtime PRN  morphine  - Injectable 2 milliGRAM(s) IV Push every 6 hours PRN      REVIEW OF SYSTEMS  --------------------------------------------------------------------------------  General: no fever  CVS: no chest pain  RESP: no sob, no cough  ABD: no abdominal pain  MSK: + edema     VITALS/PHYSICAL EXAM  --------------------------------------------------------------------------------  T(C): 36.9 (10-15-20 @ 05:15), Max: 36.9 (10-14-20 @ 13:39)  HR: 84 (10-15-20 @ 05:15) (79 - 92)  BP: 128/53 (10-15-20 @ 05:15) (128/53 - 147/68)  RR: 18 (10-15-20 @ 05:15) (16 - 18)  SpO2: 100% (10-15-20 @ 05:15) (100% - 100%)  Wt(kg): --        10-14-20 @ 07:01  -  10-15-20 @ 07:00  --------------------------------------------------------  IN: 0 mL / OUT: 1225 mL / NET: -1225 mL      Physical Exam:  	Gen: NAD  	HEENT: MMM  	Pulm: CTA B/L  	CV: S1S2  	Abd: Soft, +BS  	Ext: +LE edema B/L                      Neuro: Awake   	Skin: Warm and Dry   	Vascular access: no hd catheter           supra pubic catheter  LABS/STUDIES  --------------------------------------------------------------------------------              8.2    6.99  >-----------<  256      [10-15-20 @ 06:42]              24.3     133  |  99  |  17  ----------------------------<  179      [10-15-20 @ 06:42]  4.1   |  29  |  1.16        Ca     8.0     [10-15-20 @ 06:42]    TPro  5.8  /  Alb  2.2  /  TBili  0.6  /  DBili  x   /  AST  20  /  ALT  18  /  AlkPhos  56  [10-15-20 @ 06:42]              [10-13-20 @ 17:52]    Creatinine Trend:  SCr 1.16 [10-15 @ 06:42]  SCr 1.29 [10-14 @ 06:37]  SCr 1.43 [10-13 @ 04:26]  SCr 1.84 [10-12 @ 16:50]    Urinalysis - [10-12-20 @ 17:24]      Color Yellow / Appearance Slightly Turbid / SG 1.020 / pH 5.0      Gluc Negative / Ketone Negative  / Bili Negative / Urobili Negative       Blood Moderate / Protein 100 / Leuk Est Moderate / Nitrite Negative      RBC 2-5 / WBC 26-50 / Hyaline  / Gran  / Sq Epi  / Non Sq Epi Occasional / Bacteria Few      Iron 11, TIBC 316, %sat 4      [10-12-20 @ 16:50]  Ferritin 13      [10-13-20 @ 09:35]  TSH 6.58      [10-13-20 @ 04:26]  Lipid: chol 110, TG 89, HDL 25, LDL 67      [10-13-20 @ 04:26]    HBsAg Nonreact      [10-13-20 @ 11:55]  HCV 0.14, Nonreact      [10-13-20 @ 11:55]    GEO: titer Negative, pattern --      [10-13-20 @ 21:48]  Rheumatoid Factor <10      [10-14-20 @ 00:22]  SPEP Interpretation: Hypoalbuminemia      [10-13-20 @ 21:48]

## 2020-10-15 NOTE — PROGRESS NOTE ADULT - NSTELEHEALTH_GEN_ALL_CORE
Your child was diagnosed with vomiting and diarrhea. Antibiotics are not helpful with symptoms such as this. Make sure he or she is drinking plenty of fluids. May need to try smaller volumes more frequently for vomiting. If your child has diarrhea, can try a probiotic of choice such a culturelle or florastor to help with the diarrhea. Resuming a normal diet can also help with loose stools. Seek medical care for decreased intake or urine output, lethargy or worsening symptoms.      Vomiting and Diarrhea, Infant  Throwing up (vomiting) is a reflex where stomach contents come out of the mouth. Vomiting is different than spitting up. It is more forceful and contains more than a few spoonfuls of stomach contents. Diarrhea is frequent loose and watery bowel movements. Vomiting and diarrhea are symptoms of a condition or disease, usually in the stomach and intestines. In infants, vomiting and diarrhea can quickly cause severe loss of body fluids (dehydration).  CAUSES   The most common cause of vomiting and diarrhea is a virus called the stomach flu (gastroenteritis). Vomiting and diarrhea can also be caused by:  · Other viruses.  · Medicines.    · Eating foods that are difficult to digest or undercooked.    · Food poisoning.  · Bacteria.  · Parasites.  DIAGNOSIS   Your caregiver will perform a physical exam. Your infant may need to take an imaging test such as an X-ray or provide a urine, blood, or stool sample for testing if the vomiting and diarrhea are severe or do not improve after a few days. Tests may also be done if the reason for the vomiting is not clear.   TREATMENT   Vomiting and diarrhea often stop without treatment. If your infant is dehydrated, fluid replacement may be given. If your infant is severely dehydrated, he or she may have to stay at the hospital overnight.   HOME CARE INSTRUCTIONS   · Your infant should continue to breastfeed or bottle-feed to prevent dehydration.  · If your infant vomits right  after feeding, feed for shorter periods of time more often. Try offering the breast or bottle for 5 minutes every 30 minutes. If vomiting is better after 3-4 hours, return to the normal feeding schedule.  · Record fluid intake and urine output. Dry diapers for longer than usual or poor urine output may indicate dehydration. Signs of dehydration include:  ¨ Thirst.    ¨ Dry lips and mouth.    ¨ Sunken eyes.    ¨ Sunken soft spot on the head.    ¨ Dark urine and decreased urine production.    ¨ Decreased tear production.  · If your infant is dehydrated or becomes dehydrated, follow rehydration instructions as directed by your caregiver.  · Follow diarrhea diet instructions as directed by your caregiver.  · Do not force your infant to feed.    · If your infant has started solid foods, do not introduce new solids at this time.  · Avoid giving your child:  ¨ Foods or drinks high in sugar.  ¨ Carbonated drinks.  ¨ Juice.  ¨ Drinks with caffeine.  · Prevent diaper rash by:    ¨ Changing diapers frequently.    ¨ Cleaning the diaper area with warm water on a soft cloth.    ¨ Making sure your infant's skin is dry before putting on a diaper.    ¨ Applying a diaper ointment.    SEEK MEDICAL CARE IF:   · Your infant refuses fluids.  · Your infant's symptoms of dehydration do not go away in 24 hours.    SEEK IMMEDIATE MEDICAL CARE IF:   · Your infant who is younger than 2 months is vomiting and not just spitting up.    · Your infant is unable to keep fluids down.   · Your infant's vomiting gets worse or is not better in 12 hours.    · Your infant has blood or green matter (bile) in his or her vomit.    · Your infant has severe diarrhea or has diarrhea for more than 24 hours.    · Your infant has blood in his or her stool or the stool looks black and tarry.    · Your infant has a hard or bloated stomach.    · Your infant has not urinated in 6-8 hours, or your infant has only urinated a small amount of very dark urine.    · Your  infant shows any symptoms of severe dehydration. These include:    ¨ Extreme thirst.    ¨ Cold hands and feet.    ¨ Rapid breathing or pulse.    ¨ Blue lips.    ¨ Extreme fussiness or sleepiness.    ¨ Difficulty being awakened.    ¨ Minimal urine production.    ¨ No tears.    · Your infant who is younger than 3 months has a fever.    · Your infant who is older than 3 months has a fever and persistent symptoms.    · Your infant who is older than 3 months has a fever and symptoms suddenly get worse.    MAKE SURE YOU:   · Understand these instructions.  · Will watch your child's condition.  · Will get help right away if your child is not doing well or gets worse.     This information is not intended to replace advice given to you by your health care provider. Make sure you discuss any questions you have with your health care provider.     Document Released: 08/28/2006 Document Revised: 10/08/2014 Document Reviewed: 06/25/2014  [email or web address] Interactive Patient Education ©2016 [email or web address]    Vomiting  Vomiting occurs when stomach contents are thrown up and out the mouth. Many children notice nausea before vomiting. The most common cause of vomiting is a viral infection (gastroenteritis), also known as stomach flu. Other less common causes of vomiting include:  · Food poisoning.  · Ear infection.  · Migraine headache.  · Medicine.  · Kidney infection.  · Appendicitis.  · Meningitis.  · Head injury.  HOME CARE INSTRUCTIONS  · Give medicines only as directed by your child's health care provider.  · Follow the health care provider's recommendations on caring for your child. Recommendations may include:  ¨ Not giving your child food or fluids for the first hour after vomiting.  ¨ Giving your child fluids after the first hour has passed without vomiting. Several special blends of salts and sugars (oral rehydration solutions) are available. Ask your health care provider which one you should use. Encourage your child to drink 1-2  No teaspoons of the selected oral rehydration fluid every 20 minutes after an hour has passed since vomiting.  ¨ Encouraging your child to drink 1 tablespoon of clear liquid, such as water, every 20 minutes for an hour if he or she is able to keep down the recommended oral rehydration fluid.  ¨ Doubling the amount of clear liquid you give your child each hour if he or she still has not vomited again. Continue to give the clear liquid to your child every 20 minutes.  ¨ Giving your child bland food after eight hours have passed without vomiting. This may include bananas, applesauce, toast, rice, or crackers. Your child's health care provider can advise you on which foods are best.  ¨ Resuming your child's normal diet after 24 hours have passed without vomiting.  · It is more important to encourage your child to drink than to eat.  · Have everyone in your household practice good hand washing to avoid passing potential illness.  SEEK MEDICAL CARE IF:  · Your child has a fever.  · You cannot get your child to drink, or your child is vomiting up all the liquids you offer.  · Your child's vomiting is getting worse.  · You notice signs of dehydration in your child:  ¨ Dark urine, or very little or no urine.  ¨ Cracked lips.  ¨ Not making tears while crying.  ¨ Dry mouth.  ¨ Sunken eyes.  ¨ Sleepiness.  ¨ Weakness.  · If your child is one year old or younger, signs of dehydration include:  ¨ Sunken soft spot on his or her head.  ¨ Fewer than five wet diapers in 24 hours.  ¨ Increased fussiness.  SEEK IMMEDIATE MEDICAL CARE IF:  · Your child's vomiting lasts more than 24 hours.  · You see blood in your child's vomit.  · Your child's vomit looks like coffee grounds.  · Your child has bloody or black stools.  · Your child has a severe headache or a stiff neck or both.  · Your child has a rash.  · Your child has abdominal pain.  · Your child has difficulty breathing or is breathing very fast.  · Your child's heart rate is very  fast.  · Your child feels cold and clammy to the touch.  · Your child seems confused.  · You are unable to wake up your child.  · Your child has pain while urinating.  MAKE SURE YOU:   · Understand these instructions.  · Will watch your child's condition.  · Will get help right away if your child is not doing well or gets worse.     This information is not intended to replace advice given to you by your health care provider. Make sure you discuss any questions you have with your health care provider.     Document Released: 07/15/2015 Document Reviewed: 07/15/2015  [employer] Interactive Patient Education ©2016 [employer]

## 2020-10-15 NOTE — PROCEDURE NOTE - NSURITECHNIQUE_GU_A_CORE
The catheter was secured with a securement device (e.g. StatLock)/The collection bag is below the level of the patient and urinary bladder/All applicable medical record documentation is completed/Proper hand hygiene was performed/Sterile gloves were worn for the duration of the procedure/A sterile drape was used to cover all adjacent areas/The catheter was appropriately lubricated/The site was cleaned with soap/water and sterile solution (betadine)/The urinary drainage system is closed at the end of the procedure

## 2020-10-15 NOTE — DISCHARGE NOTE PROVIDER - CARE PROVIDER_API CALL
Andra Tamayo)  Internal Medicine  [address] [place] [address]  Phone: [phone]  Fax: [phone]  Follow Up Time:    Andra Tamayo)  Internal Medicine  [address] [place] [address]  Phone: [phone]  Fax: [phone]  Follow Up Time:     Odell Alarcon)  Gastroenterology; Internal Medicine  [address] [place] [address]  Phone: [phone]  Fax: [phone]  Follow Up Time:

## 2020-10-15 NOTE — PHYSICAL THERAPY INITIAL EVALUATION ADULT - ADDITIONAL COMMENTS
As per pt, he was not able to perform household chores on his own and without assistance As per pt, he was not able to perform household chores on his own and without assistance. Wife has been sick and in the hospital for the past 8 months.

## 2020-10-15 NOTE — DISCHARGE NOTE PROVIDER - HOSPITAL COURSE
Patient is 80y  from home, self-ambulating with PMH of chronic anemia, DM, chronic suprapubic catheter, chronic leg swelling, comes to the ED after a syncopal episode. Pt was in his usual state of health until 4 days prior to ED visit when he first noted black tarry stool. He had a repeat episode the next day and has been feeling weak and lethargic since then.      Patient was admitted to medicine for systematic anemia and syncopal episode.  Patient received total of  3 U PRBC for low H&H (3.9/12.7), today H&H 8.1/23.4. Patient had abdominal CT angio done showing Cirrhotic liver. GI dr. Alarcon on board, patient had EGD and colonoscopy. Patient also is followed by heme/oncology for anemia.  Patient seen by nephrology dr. Forman for KENNY on admission most likely due to severe anemia which improved. Cardiology consult obtain, pt seen by Dr. Sanchez echo results in mild (stage 1) diastolic disfunction, EF >55%. As per dr. Sanchez syncope mediated by blood loss and anemia secondary to GI bleeding. Patient assessed by nutritionist and has been determined to have a diagnosis of severe protein-calorie malnutrition with recommendation to advance diet with nutritional supplement. Patient was evaluated by PT with recommendation   for rolling walker, shower chair and home PT.      [id number] incomplete Patient is 80y  from home, self-ambulating with PMH of chronic anemia, DM, chronic suprapubic catheter, chronic leg swelling, comes to the ED after a syncopal episode. Pt was in his usual state of health until 4 days prior to ED visit when he first noted black tarry stool. He had a repeat episode the next day and has been feeling weak and lethargic since then.      Patient was admitted to medicine for systematic anemia and syncopal episode.  Patient received total of  3 U PRBC for low H&H (3.9/12.7), today H&H 8.1/23.4. Patient had abdominal CT angio done showing Cirrhotic liver. GI dr. Alarcon on board, patient had EGD and colonoscopy. Patient also is followed by heme/oncology for anemia.  Patient seen by nephrology dr. Forman for KENNY on admission most likely due to severe anemia which improved. Cardiology consult obtain, pt seen by Dr. Sanchez echo results in mild (stage 1) diastolic disfunction, EF >55%. As per dr. Sanchez syncope mediated by blood loss and anemia secondary to GI bleeding. Patient assessed by nutritionist and has been determined to have a diagnosis of severe protein-calorie malnutrition with recommendation to advance diet with nutritional supplement. Patient was evaluated by PT with recommendation   for rolling walker, shower chair and home PT.      [id number] Patient is 80y  from home, self-ambulating with PMH of chronic anemia, DM, chronic suprapubic catheter, chronic leg swelling, comes to the ED after a syncopal episode. He had a repeat episode the next day and has been feeling weak and lethargic since then.      Patient was admitted to medicine for systematic anemia and syncopal episode.  Patient received total of  3 U PRBC for low hemoglobin (3.9/12.7) which increased to (8.1/23.4) after transfusion. Patient had abdominal CT angio done showing Cirrhotic liver. GI dr. Alarcon on board, patient had EGD revealing esophageal varices w/o bleeding and normal colonoscopy. Patient also is followed by heme/oncology for anemia.  Patient seen by nephrology dr. Forman for KENNY on admission most likely due to severe anemia which improved. Cardiology consult obtain, pt seen by Dr. Sanchez echo results in mild (stage 1) diastolic disfunction, EF >55%. As per dr. Sanchez syncope mediated by blood loss and anemia secondary to GI bleeding. Patient assessed by nutritionist and has been determined to have a diagnosis of severe protein-calorie malnutrition with recommendation to advance diet with nutritional supplement. Patient was evaluated by PT with recommendation for rolling walker, shower chair and home PT.      [id number] Patient is 81y/o male from home,  with PMH of chronic anemia, DM, chronic suprapubic catheter, chronic leg swelling, comes to the ED after a syncopal episode. Pt found to have hemoglobin of 3.6.  Patient was admitted to medicine for systematic anemia and syncopal episode.  Patient received total of  3 U PRBC for low hemoglobin with improvement of h/h to 8.1/23.4. Patient had abdominal CT angio done showing Cirrhotic liver with stigmata of portal hypertension.  GI dr. Alarcon consulted. Patient underwent  EGD and colonoscopy that showed no acute finding or active bleeding.   Patient was also  followed by heme/oncology team for anemia and nephrology  for KENNY   Transthoracic echo showed mild (stage 1) diastolic disfunction, EF >55%.   Urology was consulted for suprapubic catheter change. New 18 Fr SPT was inserted and patient was advised to follow up with urology on outpatient bases with Dr Meade.  Creatinine WNL   Hemoglobin remained stable and there were no signs of active bleeding   Patient was evaluated by Physical Therapy with recommendation for home PT, rolling walker and shower chair.   Patient medically optimized to be discharged home with recommendation to follow up with his PCP, hematology/oncology and urology within 1-2 weeks from discharge   Given patient's improved clinical status and current hemodynamic stability, decision was made to discharge.  Please refer to patient's complete medical chart with documents for a full hospital course, for this is only a brief summary. Patient is 79y/o male from home,  with PMH of chronic anemia, DM, chronic suprapubic catheter, chronic leg swelling, comes to the ED after a syncopal episode. Pt found to have hemoglobin of 3.6.  Patient was admitted to medicine for systematic anemia and syncopal episode.  Patient received total of  3 U PRBC for low hemoglobin with improvement of h/h to 8.1/23.4. Patient had abdominal CT angio done showing Cirrhotic liver with stigmata of portal hypertension.  GI dr. Alarcon consulted. Patient underwent  EGD and colonoscopy that showed no acute finding or active bleeding.  He was recommended by GI to start nadolol on discharge.  Patient was also  followed by heme/oncology team for anemia and nephrology  for KENNY   Transthoracic echo showed mild (stage 1) diastolic disfunction, EF >55%.   Urology was consulted for suprapubic catheter change. New 18 Fr SPT was inserted and patient was advised to follow up with urology on outpatient bases with Dr Meade.  Creatinine WNL   Hemoglobin remained stable and there were no signs of active bleeding   Patient was evaluated by Physical Therapy with recommendation for home PT, rolling walker and shower chair.   Patient medically optimized to be discharged home with recommendation to follow up with his PCP, hematology/oncology and urology within 1-2 weeks from discharge   Given patient's improved clinical status and current hemodynamic stability, decision was made to discharge.  Please refer to patient's complete medical chart with documents for a full hospital course, for this is only a brief summary.

## 2020-10-15 NOTE — PROGRESS NOTE ADULT - SUBJECTIVE AND OBJECTIVE BOX
NP Note     Patient is a 80y old  Male who presents with a chief complaint of SYNCOPY (15 Oct 2020 13:22)    HPI:  Patient is 80y  from home, self-ambulating with PMH of chronic anemia, DM, chronic suprapubic catheter, chronic leg swelling, comes to the ED after a syncopal episode. Pt was in his usual state of health until 4 days prior to ED visit when he first noted black tarry stool. He had a repeat episode the next day and has been feeling weak and lethargic since then.      Patient was admitted to medicine for systematic anemia and syncopal episode.  Patient received total of  3 U PRBC for low H&H (3.9/12.7), today H&H 8.1/23.4. 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 GI dr. Alarcon onboard, patient scheduled for EGD and colonoscopy for Thursday. Patient also is followed by heme/oncology for anemia, recommended f/u CBC q 6-8h and blood transfusion if hemoglobin drops <7.  Patient seen by nephrology dr. Forman for KENNY on admission which is improving (Cr 1.43 down from 1.84 on admission) most likely due to severe anemia. Cardiology consult obtain, pt seen by Dr. Sanchez echo results in mild (stage 1) diastolic disfunction, EF >55%. As per dr. Sanchez syncope mediated by blood loss and anemia secondary to GI bleeding. Patient assessed by nutritionist for potential malnutrition and has been determined to have a diagnosis/diagnoses of Severe protein-calorie malnutrition. Recommendation to advance diet with nutritional supplement as medically feasible.      Patient seen and examined at bedside.      INTERVAL HPI/OVERNIGHT EVENTS: no new complaints    MEDICATIONS  (STANDING):  insulin lispro (HumaLOG) corrective regimen sliding scale   SubCutaneous every 6 hours  iron sucrose IVPB 200 milliGRAM(s) IV Intermittent once  meropenem  IVPB      meropenem  IVPB 500 milliGRAM(s) IV Intermittent every 8 hours  pantoprazole  Injectable 40 milliGRAM(s) IV Push two times a day    MEDICATIONS  (PRN):  lidocaine 2% Gel 1 Application(s) Topical every 6 hours PRN moderate pain  melatonin 5 milliGRAM(s) Oral at bedtime PRN Sleep  morphine  - Injectable 2 milliGRAM(s) IV Push every 6 hours PRN Severe Pain (7 - 10)      __________________________________________________  REVIEW OF SYSTEMS:    CONSTITUTIONAL: No fever,   EYES: no acute visual disturbances  NECK: No pain or stiffness  RESPIRATORY: No cough; No shortness of breath  CARDIOVASCULAR: No chest pain, no palpitations  GASTROINTESTINAL: No pain. No nausea or vomiting; No diarrhea   NEUROLOGICAL: No headache or numbness, no tremors  MUSCULOSKELETAL: No joint pain, no muscle pain  GENITOURINARY: no dysuria, no frequency, no hesitancy  PSYCHIATRY: no depression , no anxiety  ALL OTHER  ROS negative        Vital Signs Last 24 Hrs  T(C): 36.9 (15 Oct 2020 05:15), Max: 36.9 (15 Oct 2020 05:15)  T(F): 98.4 (15 Oct 2020 05:15), Max: 98.4 (15 Oct 2020 05:15)  HR: 84 (15 Oct 2020 05:15) (84 - 92)  BP: 128/53 (15 Oct 2020 05:15) (128/53 - 147/68)  BP(mean): --  RR: 18 (15 Oct 2020 05:15) (16 - 18)  SpO2: 100% (15 Oct 2020 05:15) (100% - 100%)    ________________________________________________  PHYSICAL EXAM:  GENERAL: NAD  HEENT: Normocephalic;  conjunctivae and sclerae clear; moist mucous membranes;   NECK : supple  CHEST/LUNG: Clear to auscultation bilaterally with good air entry   HEART: S1 S2  regular; no murmurs, gallops or rubs  ABDOMEN: Soft, Nontender, Nondistended; Bowel sounds present  EXTREMITIES: no cyanosis; no edema; no calf tenderness  SKIN: warm and dry; no rash  NERVOUS SYSTEM:  Awake and alert; Oriented  to place, person and time ; no new deficits    _________________________________________________  LABS:                        8.2    6.99  )-----------( 256      ( 15 Oct 2020 06:42 )             24.3     10-15    133<L>  |  99  |  17  ----------------------------<  179<H>  4.1   |  29  |  1.16    Ca    8.0<L>      15 Oct 2020 06:42    TPro  5.8<L>  /  Alb  2.2<L>  /  TBili  0.6  /  DBili  x   /  AST  20  /  ALT  18  /  AlkPhos  56  10-15        CAPILLARY BLOOD GLUCOSE      POCT Blood Glucose.: 183 mg/dL (15 Oct 2020 12:07)  POCT Blood Glucose.: 203 mg/dL (15 Oct 2020 06:18)  POCT Blood Glucose.: 250 mg/dL (15 Oct 2020 01:21)  POCT Blood Glucose.: 306 mg/dL (14 Oct 2020 21:26)  POCT Blood Glucose.: 210 mg/dL (14 Oct 2020 16:32)        RADIOLOGY & ADDITIONAL TESTS:    Imaging  Reviewed:  YES/NO    Consultant(s) Notes Reviewed:   YES/ No      Plan of care was discussed with patient and /or primary care giver; all questions and concerns were addressed NP Note     Patient is a 80y old  Male who presents with a chief complaint of SYNCOPY (15 Oct 2020 13:22)    HPI:  Patient is 80y  from home, self-ambulating with PMH of chronic anemia, DM, chronic suprapubic catheter, chronic leg swelling, comes to the ED after a syncopal episode. Pt was in his usual state of health until 4 days prior to ED visit when he first noted black tarry stool. He had a repeat episode the next day and has been feeling weak and lethargic since then.      Patient was admitted to medicine for systematic anemia and syncopal episode.  Patient received total of  3 U PRBC for low H&H (3.9/12.7), today H&H 8.1/23.4. 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 GI dr. Alarcon onboard, patient scheduled for EGD and colonoscopy for Thursday. Patient also is followed by heme/oncology for anemia, recommended f/u CBC q 6-8h and blood transfusion if hemoglobin drops <7.  Patient seen by nephrology dr. Forman for KENNY on admission which is improving (Cr 1.43 down from 1.84 on admission) most likely due to severe anemia. Cardiology consult obtain, pt seen by Dr. Sanchez echo results in mild (stage 1) diastolic disfunction, EF >55%. As per dr. Sanchez syncope mediated by blood loss and anemia secondary to GI bleeding. Patient assessed by nutritionist for potential malnutrition and has been determined to have a diagnosis/diagnoses of Severe protein-calorie malnutrition. Recommendation to advance diet with nutritional supplement as medically feasible.      Patient seen and examined at bedside.      INTERVAL HPI/OVERNIGHT EVENTS: no new complaints    MEDICATIONS  (STANDING):  insulin lispro (HumaLOG) corrective regimen sliding scale   SubCutaneous every 6 hours  iron sucrose IVPB 200 milliGRAM(s) IV Intermittent once  meropenem  IVPB      meropenem  IVPB 500 milliGRAM(s) IV Intermittent every 8 hours  pantoprazole  Injectable 40 milliGRAM(s) IV Push two times a day    MEDICATIONS  (PRN):  lidocaine 2% Gel 1 Application(s) Topical every 6 hours PRN moderate pain  melatonin 5 milliGRAM(s) Oral at bedtime PRN Sleep  morphine  - Injectable 2 milliGRAM(s) IV Push every 6 hours PRN Severe Pain (7 - 10)      __________________________________________________  REVIEW OF SYSTEMS:    CONSTITUTIONAL: No fever,   EYES: no acute visual disturbances  NECK: No pain or stiffness  RESPIRATORY: No cough; No shortness of breath  CARDIOVASCULAR: No chest pain, no palpitations  GASTROINTESTINAL: No pain. No nausea or vomiting; No diarrhea   NEUROLOGICAL: No headache or numbness, no tremors  MUSCULOSKELETAL: No joint pain, no muscle pain  GENITOURINARY: suprapubic catheter,, no frequency, no hesitancy  PSYCHIATRY: no depression , no anxiety  ALL OTHER  ROS negative        Vital Signs Last 24 Hrs  T(C): 36.9 (15 Oct 2020 05:15), Max: 36.9 (15 Oct 2020 05:15)  T(F): 98.4 (15 Oct 2020 05:15), Max: 98.4 (15 Oct 2020 05:15)  HR: 84 (15 Oct 2020 05:15) (84 - 92)  BP: 128/53 (15 Oct 2020 05:15) (128/53 - 147/68)  BP(mean): --  RR: 18 (15 Oct 2020 05:15) (16 - 18)  SpO2: 100% (15 Oct 2020 05:15) (100% - 100%)    ________________________________________________  PHYSICAL EXAM:  GENERAL: NAD  HEENT: Normocephalic;  conjunctivae and sclerae clear; moist mucous membranes;   NECK : supple  CHEST/LUNG: Clear to auscultation bilaterally with good air entry   HEART: S1 S2  regular; no murmurs, gallops or rubs  ABDOMEN: Soft, Nontender, distended,  ascites,  Bowel sounds present  EXTREMITIES: no cyanosis; no edema; no calf tenderness  SKIN: warm and dry; no rash  NERVOUS SYSTEM:  Awake and alert; Oriented  to place, person and time ; no new deficits    _________________________________________________  LABS:                        8.2    6.99  )-----------( 256      ( 15 Oct 2020 06:42 )             24.3     10-15    133<L>  |  99  |  17  ----------------------------<  179<H>  4.1   |  29  |  1.16    Ca    8.0<L>      15 Oct 2020 06:42    TPro  5.8<L>  /  Alb  2.2<L>  /  TBili  0.6  /  DBili  x   /  AST  20  /  ALT  18  /  AlkPhos  56  10-15        CAPILLARY BLOOD GLUCOSE      POCT Blood Glucose.: 183 mg/dL (15 Oct 2020 12:07)  POCT Blood Glucose.: 203 mg/dL (15 Oct 2020 06:18)  POCT Blood Glucose.: 250 mg/dL (15 Oct 2020 01:21)  POCT Blood Glucose.: 306 mg/dL (14 Oct 2020 21:26)  POCT Blood Glucose.: 210 mg/dL (14 Oct 2020 16:32)        RADIOLOGY & ADDITIONAL TESTS:      EXAM:  CT ANGIO ABD PELV (W)AW IC                            PROCEDURE DATE:  10/12/2020          INTERPRETATION:  CLINICAL INFORMATION: GI bleeding. Abdominal pain.    COMPARISON: None.    PROCEDURE:  CT of the Abdomen and Pelvis was performed with and without intravenous contrast.  Precontrast, Arterial and Delayed phases were performed.  Intravenous contrast: 90 ml Omnipaque 350. 10 ml discarded.  Oral contrast: None.  Sagittal and coronal reformats were performed.    FINDINGS:  LOWER CHEST:Trace bilateral pleural effusion and trace pericardial effusion.    LIVER: Shrunken liver with nodularity likely indicating cirrhosis.  BILE DUCTS: Normal caliber.  GALLBLADDER: Multiple gallstones without evidence of cholecystitis.  SPLEEN: Within normal limits.  PANCREAS: Within normal limits.  ADRENALS: Within normal limits.  KIDNEYS/URETERS: Right extrarenal pelvis with mild hydroureteronephrosis tracing to the level of the junction of the mid and distal ureter. No radiopaque stone is identified.    BLADDER: Suprapubic catheter in place. Collapsed.  REPRODUCTIVE ORGANS: Surgical clips. Prostatectomy?    BOWEL: No bowel obstruction. Appendix is not visualized.  PERITONEUM: Massive ascites. No hemoperitoneum.  VESSELS: No active extravasation. Prominent portal vein and superior mesenteric vein with small sized splenic vein. Extensive collateral veins around the stomach, spleen and mesentery in the left mid abdomen (19-and 90). Moderate atherosclerotic disease.  RETROPERITONEUM/LYMPH NODES: No lymphadenopathy.  ABDOMINAL WALL: Mild anasarca.  BONES: Degenerative changes.    IMPRESSION:  No active extravasation.    Cirrhotic liver with stigmata of portal hypertension with extensive left gastric, splenic and mesenteric varices.    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    Mild right hydroureteronephrosis without demonstratable obstructing lesions.    Multiple gallstones.      SARAH GUAJARDO M.D., ATTENDING RADIOLOGIST  This document has been electronically signed. Oct 12 2020  6:51PM      EXAM:  CT CHEST                            *** ADDENDUM 10/13/2020  ***    NP Mr. Del Toro is informed.    *** END OF ADDENDUM 10/13/2020  ***      PROCEDURE DATE:  10/13/2020          INTERPRETATION:  Chest CT without IV contrast    Indication: Dyspnea.    Technique: Axial multidetector CT images of the chest are acquired without IV contrast.    Comparison: No prior chest CT is available for comparison. Reference is made with a previous abdominal CT dated 10/12/2020.    Findings: Mild bilateral pleural effusions. Mild pericardial effusion. The heart is not enlarged. Aortic, coronary artery, and mitral annular calcifications are present. Mild ectasia of the ascending aorta at 3.7 cm in diameter. Allowing for the noncontrast technique, there isno grossly enlarged mediastinal, hilar, or axillary lymph node.    The trachea and central bronchi are patent. Small linear densities in the trachea, likely representing mucus secretion.    No evidence of pneumothorax, pulmonary consolidation or mass. Small calcified granuloma in the right upper lobe. A few small noncalcified nonspecific lung nodules in the upper lobes bilaterally measuring up to 5 mm in the left upper lobe (image 55 series 3); if the patient's is in the high risk category, follow-up chest CT may be pursued in 12 months to ensure stability. Small bilateral atelectasis.    Limited sections through the upper abdomen again demonstrate nodular contour of the liver, suggestive of cirrhosis. Cholelithiasis. Large ascites again noted. IV contrast excretion in the left collecting system from recent contrast abdominal CT.    Ankylosing spondylitis. Apparent nondisplaced acute horizontal fracture at the inferior T5 vertebra with involvement of the inferior endplate (image 96 series7 and image 87 series 9).    Impression: Apparent nondisplaced acute horizontal fracture at the inferior T5 vertebra with involvement of the inferior endplate.    A few small lung nodules in the upper lobes bilaterally; if the patient's is in the high risk category (i.e. smoker), follow-up chest CT may be pursued in 12 months to ensure stability.    No evidence of pneumothorax, pulmonary consolidation or mass.    Small linear densities in the trachea, likely representing mucus secretion.    Mild bilateral pleural effusions.    Mild pericardial effusion.    ***Please see the addendum at the top of this report. It may contain additional important information or changes.****      ROSSI HOBSON M.D., ATTENDING RADIOLOGIST  This document has been electronically signed. Oct 13 2020 10:05AM  Addend:ROSSI HOBSON M.D., ATTENDING RADIOLOGIST  This addendum was electronically signed on: Oct 13 2020 11:20AM.        EXAM:  US ABDOMEN LIMITED                            PROCEDURE DATE:  10/15/2020          INTERPRETATION:  CLINICAL INDICATION: 80 years  Male with Ascites    r/o Ascites. Cirrhosis.    COMPARISON: CT abdomen and pelvis 10/12/2020    TECHNIQUE: Limited abdominal ultrasound    FINDINGS/  IMPRESSION:    Limited abdominal ultrasound demonstrates moderate ascites throughout the abdomen. In the right lower quadrant, the pocket of ascites measures 13.4 cm in AP dimension from the nearest bowel.        CELINA RODRIGUEZ M.D., ATTENDING RADIOLOGIST  This document has been electronically signed. Oct 15 2020 10:56AM    Imaging  Reviewed:  YES    Consultant(s) Notes Reviewed:   YES      Plan of care was discussed with patient and /or primary care giver; all questions and concerns were addressed NP Note     Patient is a 80y old  Male who presents with a chief complaint of SYNCOPY (15 Oct 2020 13:22)    HPI:  Patient is 80y  from home, self-ambulating with PMH of chronic anemia, DM, chronic suprapubic catheter, chronic leg swelling, comes to the ED after a syncopal episode. Pt was in his usual state of health until 4 days prior to ED visit when he first noted black tarry stool. He had a repeat episode the next day and has been feeling weak and lethargic since then.      Patient was admitted to medicine for systematic anemia and syncopal episode.  Patient received total of  3 U PRBC for low H&H. 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 GI dr. Alarcon onboard, patient undergo EGD and colonoscopy today. Patient also is followed by heme/oncology team for anemia.  Patient seen by nephrology dr. Forman for KENNY on admission which is improving most likely due to severe anemia. Cardiology consult obtain, pt seen by Dr. Sanchez echo results in mild (stage 1) diastolic disfunction, EF >55%. As per dr. Sanchez syncope mediated by blood loss and anemia secondary to GI bleeding. SP catheter changed by urology today, f/u with urologist as outpatient. Patient assessed by nutritionist for potential malnutrition and has been determined to have a diagnosis/diagnoses of Severe protein-calorie malnutrition. Recommendation to advance  diet with nutritional supplement as medically feasible.      Patient seen and examined at bedside.      INTERVAL HPI/OVERNIGHT EVENTS: no new complaints    MEDICATIONS  (STANDING):  insulin lispro (HumaLOG) corrective regimen sliding scale   SubCutaneous every 6 hours  iron sucrose IVPB 200 milliGRAM(s) IV Intermittent once  meropenem  IVPB      meropenem  IVPB 500 milliGRAM(s) IV Intermittent every 8 hours  pantoprazole  Injectable 40 milliGRAM(s) IV Push two times a day    MEDICATIONS  (PRN):  lidocaine 2% Gel 1 Application(s) Topical every 6 hours PRN moderate pain  melatonin 5 milliGRAM(s) Oral at bedtime PRN Sleep  morphine  - Injectable 2 milliGRAM(s) IV Push every 6 hours PRN Severe Pain (7 - 10)      __________________________________________________  REVIEW OF SYSTEMS:    CONSTITUTIONAL: No fever,   EYES: no acute visual disturbances  NECK: No pain or stiffness  RESPIRATORY: No cough; No shortness of breath  CARDIOVASCULAR: No chest pain, no palpitations  GASTROINTESTINAL: No pain. No nausea or vomiting; No diarrhea   NEUROLOGICAL: No headache or numbness, no tremors  MUSCULOSKELETAL: No joint pain, no muscle pain  GENITOURINARY: suprapubic catheter,, no frequency, no hesitancy  PSYCHIATRY: no depression , no anxiety  ALL OTHER  ROS negative        Vital Signs Last 24 Hrs  T(C): 36.9 (15 Oct 2020 05:15), Max: 36.9 (15 Oct 2020 05:15)  T(F): 98.4 (15 Oct 2020 05:15), Max: 98.4 (15 Oct 2020 05:15)  HR: 84 (15 Oct 2020 05:15) (84 - 92)  BP: 128/53 (15 Oct 2020 05:15) (128/53 - 147/68)  BP(mean): --  RR: 18 (15 Oct 2020 05:15) (16 - 18)  SpO2: 100% (15 Oct 2020 05:15) (100% - 100%)    ________________________________________________  PHYSICAL EXAM:  GENERAL: NAD  HEENT: Normocephalic;  conjunctivae and sclerae clear; moist mucous membranes;   NECK : supple  CHEST/LUNG: Clear to auscultation bilaterally with good air entry   HEART: S1 S2  regular; no murmurs, gallops or rubs  ABDOMEN: Soft, Nontender, distended,  ascites,  Bowel sounds present  EXTREMITIES: no cyanosis; no edema; no calf tenderness  SKIN: warm and dry; no rash  NERVOUS SYSTEM:  Awake and alert; Oriented  to place, person and time ; no new deficits    _________________________________________________  LABS:                        8.2    6.99  )-----------( 256      ( 15 Oct 2020 06:42 )             24.3     10-15    133<L>  |  99  |  17  ----------------------------<  179<H>  4.1   |  29  |  1.16    Ca    8.0<L>      15 Oct 2020 06:42    TPro  5.8<L>  /  Alb  2.2<L>  /  TBili  0.6  /  DBili  x   /  AST  20  /  ALT  18  /  AlkPhos  56  10-15        CAPILLARY BLOOD GLUCOSE      POCT Blood Glucose.: 183 mg/dL (15 Oct 2020 12:07)  POCT Blood Glucose.: 203 mg/dL (15 Oct 2020 06:18)  POCT Blood Glucose.: 250 mg/dL (15 Oct 2020 01:21)  POCT Blood Glucose.: 306 mg/dL (14 Oct 2020 21:26)  POCT Blood Glucose.: 210 mg/dL (14 Oct 2020 16:32)        RADIOLOGY & ADDITIONAL TESTS:      EXAM:  CT ANGIO ABD PELV (W)AW IC                            PROCEDURE DATE:  10/12/2020          INTERPRETATION:  CLINICAL INFORMATION: GI bleeding. Abdominal pain.    COMPARISON: None.    PROCEDURE:  CT of the Abdomen and Pelvis was performed with and without intravenous contrast.  Precontrast, Arterial and Delayed phases were performed.  Intravenous contrast: 90 ml Omnipaque 350. 10 ml discarded.  Oral contrast: None.  Sagittal and coronal reformats were performed.    FINDINGS:  LOWER CHEST:Trace bilateral pleural effusion and trace pericardial effusion.    LIVER: Shrunken liver with nodularity likely indicating cirrhosis.  BILE DUCTS: Normal caliber.  GALLBLADDER: Multiple gallstones without evidence of cholecystitis.  SPLEEN: Within normal limits.  PANCREAS: Within normal limits.  ADRENALS: Within normal limits.  KIDNEYS/URETERS: Right extrarenal pelvis with mild hydroureteronephrosis tracing to the level of the junction of the mid and distal ureter. No radiopaque stone is identified.    BLADDER: Suprapubic catheter in place. Collapsed.  REPRODUCTIVE ORGANS: Surgical clips. Prostatectomy?    BOWEL: No bowel obstruction. Appendix is not visualized.  PERITONEUM: Massive ascites. No hemoperitoneum.  VESSELS: No active extravasation. Prominent portal vein and superior mesenteric vein with small sized splenic vein. Extensive collateral veins around the stomach, spleen and mesentery in the left mid abdomen (19-and 90). Moderate atherosclerotic disease.  RETROPERITONEUM/LYMPH NODES: No lymphadenopathy.  ABDOMINAL WALL: Mild anasarca.  BONES: Degenerative changes.    IMPRESSION:  No active extravasation.    Cirrhotic liver with stigmata of portal hypertension with extensive left gastric, splenic and mesenteric varices.    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    Mild right hydroureteronephrosis without demonstratable obstructing lesions.    Multiple gallstones.      SARAH GUAJARDO M.D., ATTENDING RADIOLOGIST  This document has been electronically signed. Oct 12 2020  6:51PM      EXAM:  CT CHEST                            *** ADDENDUM 10/13/2020  ***    NP Mr. Del Toro is informed.    *** END OF ADDENDUM 10/13/2020  ***      PROCEDURE DATE:  10/13/2020          INTERPRETATION:  Chest CT without IV contrast    Indication: Dyspnea.    Technique: Axial multidetector CT images of the chest are acquired without IV contrast.    Comparison: No prior chest CT is available for comparison. Reference is made with a previous abdominal CT dated 10/12/2020.    Findings: Mild bilateral pleural effusions. Mild pericardial effusion. The heart is not enlarged. Aortic, coronary artery, and mitral annular calcifications are present. Mild ectasia of the ascending aorta at 3.7 cm in diameter. Allowing for the noncontrast technique, there isno grossly enlarged mediastinal, hilar, or axillary lymph node.    The trachea and central bronchi are patent. Small linear densities in the trachea, likely representing mucus secretion.    No evidence of pneumothorax, pulmonary consolidation or mass. Small calcified granuloma in the right upper lobe. A few small noncalcified nonspecific lung nodules in the upper lobes bilaterally measuring up to 5 mm in the left upper lobe (image 55 series 3); if the patient's is in the high risk category, follow-up chest CT may be pursued in 12 months to ensure stability. Small bilateral atelectasis.    Limited sections through the upper abdomen again demonstrate nodular contour of the liver, suggestive of cirrhosis. Cholelithiasis. Large ascites again noted. IV contrast excretion in the left collecting system from recent contrast abdominal CT.    Ankylosing spondylitis. Apparent nondisplaced acute horizontal fracture at the inferior T5 vertebra with involvement of the inferior endplate (image 96 series7 and image 87 series 9).    Impression: Apparent nondisplaced acute horizontal fracture at the inferior T5 vertebra with involvement of the inferior endplate.    A few small lung nodules in the upper lobes bilaterally; if the patient's is in the high risk category (i.e. smoker), follow-up chest CT may be pursued in 12 months to ensure stability.    No evidence of pneumothorax, pulmonary consolidation or mass.    Small linear densities in the trachea, likely representing mucus secretion.    Mild bilateral pleural effusions.    Mild pericardial effusion.    ***Please see the addendum at the top of this report. It may contain additional important information or changes.****      ROSSI HOBSON M.D., ATTENDING RADIOLOGIST  This document has been electronically signed. Oct 13 2020 10:05AM  Addend:ROSSI HOBSON M.D., ATTENDING RADIOLOGIST  This addendum was electronically signed on: Oct 13 2020 11:20AM.        EXAM:  US ABDOMEN LIMITED                            PROCEDURE DATE:  10/15/2020          INTERPRETATION:  CLINICAL INDICATION: 80 years  Male with Ascites    r/o Ascites. Cirrhosis.    COMPARISON: CT abdomen and pelvis 10/12/2020    TECHNIQUE: Limited abdominal ultrasound    FINDINGS/  IMPRESSION:    Limited abdominal ultrasound demonstrates moderate ascites throughout the abdomen. In the right lower quadrant, the pocket of ascites measures 13.4 cm in AP dimension from the nearest bowel.        CELINA RODRIGUEZ M.D., ATTENDING RADIOLOGIST  This document has been electronically signed. Oct 15 2020 10:56AM    Imaging  Reviewed:  YES    Consultant(s) Notes Reviewed:   YES      Plan of care was discussed with patient and /or primary care giver; all questions and concerns were addressed

## 2020-10-15 NOTE — PROCEDURE NOTE - NSPROCDETAILS_GEN_ALL_CORE
sterile technique, non-indwelling urinary device inserted/a urinary catheter insertion kit was used for all insertion materials/sterile technique, indwelling urinary device inserted/sterile technique, old cysto removed, new tube inserted/prior to placement, an active physician order for the placement of a urinary catheter was verified

## 2020-10-15 NOTE — PROGRESS NOTE ADULT - SUBJECTIVE AND OBJECTIVE BOX
Patient denies chest pain or shortness of breath.   Review of systems otherwise (-)  	  MEDICATIONS:  MEDICATIONS  (STANDING):  insulin lispro (HumaLOG) corrective regimen sliding scale   SubCutaneous three times a day before meals  iron sucrose IVPB 200 milliGRAM(s) IV Intermittent once  melatonin 3 milliGRAM(s) Oral at bedtime  pantoprazole  Injectable 40 milliGRAM(s) IV Push two times a day      LABS:	 	    CARDIAC MARKERS:                                8.3    6.00  )-----------( 264      ( 16 Oct 2020 06:39 )             25.2     Hemoglobin: 8.3 g/dL (10-16 @ 06:39)  Hemoglobin: 8.2 g/dL (10-15 @ 06:42)  Hemoglobin: 8.4 g/dL (10-14 @ 16:01)  Hemoglobin: 7.5 g/dL (10-14 @ 06:37)  Hemoglobin: 7.9 g/dL (10-14 @ 00:09)      10-16    132<L>  |  101  |  16  ----------------------------<  186<H>  4.2   |  28  |  1.04    Ca    8.0<L>      16 Oct 2020 06:39    TPro  5.4<L>  /  Alb  2.0<L>  /  TBili  0.9  /  DBili  x   /  AST  19  /  ALT  17  /  AlkPhos  55  10-16    Creatinine Trend: 1.04<--, 1.16<--, 1.29<--, 1.43<--, 1.84<--      PHYSICAL EXAM:  T(C): 36.2 (10-16-20 @ 05:20), Max: 37.3 (10-15-20 @ 21:09)  HR: 92 (10-16-20 @ 05:20) (75 - 92)  BP: 135/50 (10-16-20 @ 05:20) (112/75 - 140/59)  RR: 17 (10-16-20 @ 05:20) (17 - 18)  SpO2: 98% (10-16-20 @ 05:20) (98% - 100%)  Wt(kg): --  I&O's Summary    15 Oct 2020 07:01  -  16 Oct 2020 07:00  --------------------------------------------------------  IN: 0 mL / OUT: 450 mL / NET: -450 mL          Gen: Appears well in NAD  HEENT:  (-)icterus (-)pallor  CV: N S1 S2 1/6 AMBREEN (+)2 Pulses B/l  Resp:  Clear to ausculatation B/L, normal effort  GI: (+) BS Soft, NT, ND  Lymph:  (+)Edema, (-)obvious lymphadenopathy  Skin: Warm to touch, Normal turgor  Psych: Appropriate mood and affect      TELEMETRY: 	  OFF        ASSESSMENT/PLAN: 	80y  Male PMH of chronic anemia, Cirrhosis, DM, chronic suprapubic catheter, chronic leg swelling, comes to the ED after a syncopal episode and suspected GI bleed.      - Suspect LOC was a hemodynamically mediated event in the setting of acute blood loss anemia  - Echo with no pertinent findings  - GI f/u  - MOnitor H/H  - No need for further inpatient cardiac work up.    Lele Sanchez MD, [facility]  BEEPER [phone]

## 2020-10-15 NOTE — DISCHARGE NOTE PROVIDER - NSDCCPCAREPLAN_GEN_ALL_CORE_FT
PRINCIPAL DISCHARGE DIAGNOSIS  Diagnosis: Symptomatic anemia  Assessment and Plan of Treatment:       SECONDARY DISCHARGE DIAGNOSES  Diagnosis: Cirrhosis  Assessment and Plan of Treatment: Cirrhosis     PRINCIPAL DISCHARGE DIAGNOSIS  Diagnosis: Symptomatic anemia  Assessment and Plan of Treatment: You were diagnosed with anemia  Symptoms to report, bleeding, palpitations, fatigue, pale skin, cold skin, dizziness. Take medications as ordered by PCP        SECONDARY DISCHARGE DIAGNOSES  Diagnosis: Cirrhosis  Assessment and Plan of Treatment: You have liver cirrhosis  Cirrhosis is scarring of the liver which can cause heavy bleeding, swelling, & breathing problems  Call your doctor with belly & leg swelling, shortness of breath, bruising or bleeding easily, feeling full, tired, trouble getting enough or too much sleep, yellowing of skin or whites of eye, sudden confusion, or coma  Causes may include heavy alcohol use, hepatitis B or C, or fatty liver.    You can help yourself by avoiding alcohol, speak with your doctor before starting on any new medication, herbs, vitamins, or supplements which may cause more damage to the liver  Treatment includes treating the cause, reducing blood pressure, low salt diet, diuretics, belly fluid drainage, treating infections, getting vaccinations to prevent common infections, &/or lactulose to improve confusion       PRINCIPAL DISCHARGE DIAGNOSIS  Diagnosis: Symptomatic anemia  Assessment and Plan of Treatment: You were diagnosed with anemia  Symptoms to report, bleeding, palpitations, fatigue, pale skin, cold skin, dizziness. Take medications as ordered by PCP  Please follow up with your doctor within one week.        SECONDARY DISCHARGE DIAGNOSES  Diagnosis: Cirrhosis  Assessment and Plan of Treatment: You have liver cirrhosis  Cirrhosis is scarring of the liver which can cause heavy bleeding, swelling, & breathing problems  Call your doctor with belly & leg swelling, shortness of breath, bruising or bleeding easily, feeling full, tired, trouble getting enough or too much sleep, yellowing of skin or whites of eye, sudden confusion, or coma  Causes may include heavy alcohol use, hepatitis B or C, or fatty liver.    You can help yourself by avoiding alcohol, speak with your doctor before starting on any new medication, herbs, vitamins, or supplements which may cause more damage to the liver  Treatment includes treating the cause, reducing blood pressure, low salt diet, diuretics, belly fluid drainage, treating infections, getting vaccinations to prevent common infections, &/or lactulose to improve confusion       PRINCIPAL DISCHARGE DIAGNOSIS  Diagnosis: Symptomatic anemia  Assessment and Plan of Treatment: You were diagnosed with anemia  Symptoms to report, bleeding, palpitations, fatigue, pale skin, cold skin, dizziness. Take medications as ordered by PCP  Please follow up with your doctor within one week.        SECONDARY DISCHARGE DIAGNOSES  Diagnosis: Cirrhosis  Assessment and Plan of Treatment: You have liver cirrhosis  Take Nadalol 40 mg daily as prescribed.   Follow up with Dr Hinojosa at his office in 1-2 weeks   Please call the office for appointment [phone]  Cirrhosis is scarring of the liver which can cause heavy bleeding, swelling, & breathing problems  Call your doctor with belly & leg swelling, shortness of breath, bruising or bleeding easily, feeling full, tired, trouble getting enough or too much sleep, yellowing of skin or whites of eye, sudden confusion, or coma  Causes may include heavy alcohol use, hepatitis B or C, or fatty liver.    You can help yourself by avoiding alcohol, speak with your doctor before starting on any new medication, herbs, vitamins, or supplements which may cause more damage to the liver  Treatment includes treating the cause, reducing blood pressure, low salt diet, diuretics, belly fluid drainage, treating infections, getting vaccinations to prevent common infections, &/or lactulose to improve confusion

## 2020-10-15 NOTE — PROGRESS NOTE ADULT - SUBJECTIVE AND OBJECTIVE BOX
complete note to follow     · Subjective and Objective:   History of Present Illness:  Reason for Admission: SYNCOPE  History of Present Illness:   80M from home, self-ambulating with PMH of chronic anemia, DM, chronic suprapubic catheter, chronic leg swelling, comes to the ED after a syncopal episode today. Pt was in his usual state of health until 4 days ago when he first noted black tarry stool. He had a repeat episode the nest day and has been feeling weak and lethargic since then. He called his doctor friends in [place] who suggested him to visit ED. Today morning when he got off from his bed he was noted to be dizzy and then fell down. Pt doesn't remember distinctly but thinks he had LOC. Pt denies any chest pain, shortness of breath, palpitations, N/V, Abd pain, diarrhea or any NSAID or blood thinner use.   Pt stays alone at home. 8 months ago his wife was diagnosed with Brain cancer and is admitted in the [facility].   Pt denies any smoking, alcohol or any form of substance abuse.     Hem/OncHx: Pt states Dx with Melanoma 2014, treated with resection only. Prostate Cancer Dx 2015, s/p radiation, no further tx given and in remission. Pt states he has long hx of anemia, but no prior transfusions. Dx with cirrhosis 2017 pt states due to Hepatitis (type unknown) and follows with Hepatology at [facility], on ?lasix, paracentesis approx q 6 months.     Interval: Pt seen and examined at bedside. On venofer, Hgb stable. S/p GI w/u 10/14    REVIEW OF SYSTEMS  Constitutional:   No fever, + fatigue, + pallor, no night sweats, no weight loss.  HEENT:   No eye pain, no vision changes, no icterus, no mouth ulcers.  Respiratory:   No shortness of breath, no cough, no respiratory distress.   Cardiovascular:   No chest pain, no palpitations.   Gastrointestinal: +abdominal distention, No abdominal pain, no nausea, no vomiting , no diarrhea, no constipation, no hematochezia, + melena.  Skin:   No rashes, no jaundice, no eczema.   Musculoskeletal:   No joint pain, + LE swelling, no myalgia.   Neurologic:   No headache, no seizure, no weakness.   Genitourinary:   No dysuria, no decreased urine output.  Psychiatric:  No depression, no anxiety,   Endocrine:   No thyroid disease, no diabetes.  Heme/Lymphatic:   No anemia, no blood transfusions, no lymph node enlargement, no bleeding, no bruising.  ___________________________________________________________________________________________  Allergies: No Known Allergies    Home Medications:  Pt states he takes a water pill two days/week (prescribed by Hepatologist)    MEDICATIONS  (STANDING):  cefTRIAXone   IVPB      insulin lispro (HumaLOG) corrective regimen sliding scale   SubCutaneous three times a day before meals  pantoprazole  Injectable 40 milliGRAM(s) IV Push two times a day    Patient History:     PAST MEDICAL HISTORY:  Anemia   DM (diabetes mellitus)  cirrhosis  Melanoma  Prostate CA    Social History: Pt stays alone at home. 8 months ago his wife was diagnosed with Brain cancer and is admitted in the [facility]. Pt usually gets his food delivered  Denies ETOH    FamHx: denies any bleeding disorders or cancer diagnosis    Physical Exam  General:  awake, pallor, NAD.  HEENT:    Normal appearance of conjunctiva, ears, nose, lips, oropharynx, and oral mucosa, anicteric.  Neck:  No masses, no asymmetry.  Lymph Nodes:  No lymphadenopathy.   Cardiovascular:  S1/S2, no murmur.  Respiratory:  CTA B/L, normal respiratory effort.   Abdominal:   tense, distended, BS +, unable to assess for hepatosplenomegaly  Extremities:   B/L LE pitting edema L>R with LLE + tenderness  Skin:   No rash, jaundice    Neuro: No focal deficits.     Labs:    CBC Full  -  ( 15 Oct 2020 06:42 )  WBC Count : 6.99 K/uL  RBC Count : 2.98 M/uL  Hemoglobin : 8.2 g/dL  Hematocrit : 24.3 %  Platelet Count - Automated : 256 K/uL  Mean Cell Volume : 81.5 fl  Mean Cell Hemoglobin : 27.5 pg  Mean Cell Hemoglobin Concentration : 33.7 gm/dL  Auto Neutrophil # : x  Auto Lymphocyte # : x  Auto Monocyte # : x  Auto Eosinophil # : x  Auto Basophil # : x  Auto Neutrophil % : x  Auto Lymphocyte % : x  Auto Monocyte % : x  Auto Eosinophil % : x  Auto Basophil % : x    10-15    133<L>  |  99  |  17  ----------------------------<  179<H>  4.1   |  29  |  1.16    Ca    8.0<L>      15 Oct 2020 06:42    TPro  5.8<L>  /  Alb  2.2<L>  /  TBili  0.6  /  DBili  x   /  AST  20  /  ALT  18  /  AlkPhos  56  10-15    < from: CT Angio Abdomen and Pelvis w/ IV Cont (10.12.20 @ 18:15) >  EXAM:  CT ANGIO ABD PELV (W)AW IC                            PROCEDURE DATE:  10/12/2020          INTERPRETATION:  CLINICAL INFORMATION: GI bleeding. Abdominal pain.    COMPARISON: None.    PROCEDURE:  CT of the Abdomen and Pelvis was performed with and without intravenous contrast.  Precontrast, Arterial and Delayed phases were performed.  Intravenous contrast: 90 ml Omnipaque 350. 10 ml discarded.  Oral contrast: None.  Sagittal and coronal reformats were performed.    FINDINGS:  LOWER CHEST:Trace bilateral pleural effusion and trace pericardial effusion.    LIVER: Shrunken liver with nodularity likely indicating cirrhosis.  BILE DUCTS: Normal caliber.  GALLBLADDER: Multiple gallstones without evidence of cholecystitis.  SPLEEN: Within normal limits.  PANCREAS: Within normal limits.  ADRENALS: Within normal limits.  KIDNEYS/URETERS: Right extrarenal pelvis with mild hydroureteronephrosis tracing to the level of the junction of the mid and distal ureter. No radiopaque stone is identified.    BLADDER: Suprapubic catheter in place. Collapsed.  REPRODUCTIVE ORGANS: Surgical clips. Prostatectomy?    BOWEL: No bowel obstruction. Appendix is not visualized.  PERITONEUM: Massive ascites. No hemoperitoneum.  VESSELS: No active extravasation. Prominent portal vein and superior mesenteric vein with small sized splenic vein. Extensive collateral veins around the stomach, spleen and mesentery in the left mid abdomen (19-and 90). Moderate atherosclerotic disease.  RETROPERITONEUM/LYMPH NODES: No lymphadenopathy.  ABDOMINAL WALL: Mild anasarca.  BONES: Degenerative changes.    IMPRESSION:  No active extravasation.    Cirrhotic liver with stigmata of portal hypertension with extensive left gastric, splenic and mesenteric varices.    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    Mild right hydroureteronephrosis without demonstratable obstructing lesions.    Multiple gallstones.            SARAH GUAJARDO M.D., ATTENDING RADIOLOGIST  This document has been electronically signed. Oct 12 2020  6:51PM    < end of copied text >  < from: CT Chest No Cont (10.13.20 @ 09:27) >    EXAM:  CT CHEST                            *** ADDENDUM 10/13/2020  ***    NP Mr. Del Toro is informed.    *** END OF ADDENDUM 10/13/2020  ***      PROCEDURE DATE:  10/13/2020          INTERPRETATION:  Chest CT without IV contrast    Indication: Dyspnea.    Technique: Axial multidetector CT images of the chest are acquired without IV contrast.    Comparison: No prior chest CT is available for comparison. Reference is made with a previous abdominal CT dated 10/12/2020.    Findings: Mild bilateral pleural effusions. Mild pericardial effusion. The heart is not enlarged. Aortic, coronary artery, and mitral annular calcifications are present. Mild ectasia of the ascending aorta at 3.7 cm in diameter. Allowing for the noncontrast technique, there isno grossly enlarged mediastinal, hilar, or axillary lymph node.    The trachea and central bronchi are patent. Small linear densities in the trachea, likely representing mucus secretion.    No evidence of pneumothorax, pulmonary consolidation or mass. Small calcified granuloma in the right upper lobe. A few small noncalcified nonspecific lung nodules in the upper lobes bilaterally measuring up to 5 mm in the left upper lobe (image 55 series 3); if the patient's is in the high risk category, follow-up chest CT may be pursued in 12 months to ensure stability. Small bilateral atelectasis.    Limited sections through the upper abdomen again demonstrate nodular contour of the liver, suggestive of cirrhosis. Cholelithiasis. Large ascites again noted. IV contrast excretion in the left collecting system from recent contrast abdominal CT.    Ankylosing spondylitis. Apparent nondisplaced acute horizontal fracture at the inferior T5 vertebra with involvement of the inferior endplate (image 96 series7 and image 87 series 9).    Impression: Apparent nondisplaced acute horizontal fracture at the inferior T5 vertebra with involvement of the inferior endplate.    A few small lung nodules in the upper lobes bilaterally; if the patient's is in the high risk category (i.e. smoker), follow-up chest CT may be pursued in 12 months to ensure stability.    No evidence of pneumothorax, pulmonary consolidation or mass.    Small linear densities in the trachea, likely representing mucus secretion.    Mild bilateral pleural effusions.    Mild pericardial effusion.    ***Please see the addendum at the top of this report. It may contain additional important information or changes.****        ROSSI HOBSON M.D., ATTENDING RADIOLOGIST  This document has been electronically signed. Oct 13 2020 10:05AM  Addend:ROSSI HOBSON M.D., ATTENDING RADIOLOGIST  This addendum was electronically signed on: Oct 13 2020 11:20AM.    < end of copied text >    < from: US Duplex Venous Lower Ext Ltd, Left (10.13.20 @ 16:17) >  EXAM:  US DPLX LWR EXT VEINS LTD LT                            PROCEDURE DATE:  10/13/2020          INTERPRETATION:  CLINICAL INFORMATION: Left lower extremity swelling    COMPARISON: None available.    TECHNIQUE: Duplex sonography of the LEFT LOWER extremity veins with color and spectral Doppler, with and without compression. Note that the left lung gastrocnemius veins are not visualized.    FINDINGS:    There is normal compressibility of the left common femoral, femoral and popliteal veins.  Doppler examination shows normal spontaneous and phasic flow.    No calf vein thrombosis is detected. Calf edema is present.    IMPRESSION:  No evidence of left lower extremity deep venous thrombosis.   Please note, there is limited visualization of the left calf veins due to calf edema.  Therefore, if  clinical concern persists, reevaluation can be performed after 5 to 7 days to evaluate for propagation of clot.                KATRIN WHITE M.D., ATTENDING RADIOLOGIST  This document has been electronically signed. Oct 13 2020  4:33PM    < end of copied text >    < from: Transthoracic Echocardiogram (10.14.20 @ 09:40) >    Patient name: AYE MELENDEZ  YOB: 1940   Age: 80 (M)   MR#: [id number]  Study Date: 10/14/2020  [facility]  Study quality: Fair  Referring Physician:  SILAS DIAZ MD  Blood Pressure: 146/67 mmHg  Height: 183 cm  Weight: 77 kg  BSA: 2 m2  ------------------------------------------------------------------------    PROCEDURE: Transthoracic echocardiogram with 2-D, M-Mode  and complete spectral and color flow Doppler.  INDICATION: Syncope and Collapse (R55)  HISTORY:  ------------------------------------------------------------------------  DIMENSIONS:  Dimensions:     Normal Values:  LA:     3.8 cm    2.0 - 4.0 cm  Ao:     3.1 cm    2.0 - 3.8 cm  SEPTUM: 1.2 cm    0.6 - 1.2 cm  PWT:    1.0 cm 0.6 - 1.1 cm  LVIDd:  3.4 cm    3.0 - 5.6 cm  LVIDs:  2.4 cm    1.8 - 4.0 cm      Derived Variables:  LVMI: 57 g/m2  RWT: 0.58  Ejection Fraction Visual Estimate: >55 %    ------------------------------------------------------------------------  OBSERVATIONS:  Mitral Valve: Mitral annular calcification. Trace mitral  regurgitation.  Aortic Root: Normal aortic root.  Aortic Valve: Calcified aortic valve with normal opening.  Left Atrium: Normal left atrium.  LA volume index = 26  cc/m2.  Left Ventricle: Normal left ventricular systolic function.   Mild diastolic dysfunction (stage I). Increased relative  wall thickness with normal left ventricular (LV) mass  index, consistent with concentric LV remodeling.  Right Heart: Normal right atrium. Normal right ventricular  size and function. There is mild tricuspid regurgitation.  There is trace pulmonic regurgitation.  Pericardium/PleuraTrivial pericardial effusion.  Hemodynamic: RA Pressure is 8 mm Hg. RV systolic pressure  is 24 mm Hg.  ------------------------------------------------------------------------  CONCLUSIONS:  1. Mitral annular calcification. Trace mitral  regurgitation.  2. Increased relative wall thickness with normal left  ventricular (LV) mass index, consistent with concentric LV  remodeling.  3. Normal left ventricular systolic function.   Mild  diastolic dysfunction (stage I).  4. Normal right ventricular size and function.  5. Trivial pericardial effusion.    ------------------------------------------------------------------------  Confirmed on  10/14/2020 - 13:32:32 by Lele Sanchez MD  ------------------------------------------------------------------------    < end of copied text >  < from: US Duplex Carotid Arteries Complete, Bilateral (10.13.20 @ 11:46) >    EXAM:  US DPLX CAROTIDS COMPL BI                            PROCEDURE DATE:  10/13/2020          INTERPRETATION:  ULTRASOUND OF THE CAROTID ARTERIES    CLINICAL INDICATION: Syncope.    TECHNIQUE:   Doppler ultrasonography of the carotid arteries was performed utilizing grayscale, color and spectral Doppler.   The magnitude of stenosis was estimated based on established tables of systolic peak velocity related to the magnitude of carotid stenosis.    COMPARISON: No prior studies available for comparison.    FINDINGS:  Atherosclerotic plaques are identified within the bilateral common carotid arteries, carotid bulbs, as well as within the bilateral internal and external carotid arteries.    Antegrade flow is present in both vertebral arteries.    Velocities expressed in centimeters per second are as follows:    RIGHT:  Proximal CCA = 95.7; Distal CCA = 98.3; Proximal ICA = 209.3; Distal ICA = 106.6;  ECA = 143.8  LEFT:    Proximal CCA = 97.0; Distal CCA = 115.9; Proximal ICA = 90.4; Distal ICA = 95.6;  ECA = 104.8    Right peak systolic IC/CC velocity ratio: 2.1  Left peak systolic IC/CC velocity ratio: 0.8    IMPRESSION:  1.  Right carotid system:  50-69% right ICA diameter stenosis.  2.  Left carotid system:  No hemodynamically significant stenosis is found.  3. CTA/MRA evaluation can be performed as clinically desired.            EMILIE URENA M.D., ATTENDING RADIOLOGIST  This document has been electronically signed. Oct 13 2020 11:59AM    < end of copied text >    < from: CT Chest No Cont (10.13.20 @ 09:27) >  EXAM:  CT CHEST                            *** ADDENDUM 10/13/2020  ***    NP Mr. Del Toro is informed.    *** END OF ADDENDUM 10/13/2020  ***      PROCEDURE DATE:  10/13/2020          INTERPRETATION:  Chest CT without IV contrast    Indication: Dyspnea.    Technique: Axial multidetector CT images of the chest are acquired without IV contrast.    Comparison: No prior chest CT is available for comparison. Reference is made with a previous abdominal CT dated 10/12/2020.    Findings: Mild bilateral pleural effusions. Mild pericardial effusion. The heart is not enlarged. Aortic, coronary artery, and mitral annular calcifications are present. Mild ectasia of the ascending aorta at 3.7 cm in diameter. Allowing for the noncontrast technique, there isno grossly enlarged mediastinal, hilar, or axillary lymph node.    The trachea and central bronchi are patent. Small linear densities in the trachea, likely representing mucus secretion.    No evidence of pneumothorax, pulmonary consolidation or mass. Small calcified granuloma in the right upper lobe. A few small noncalcified nonspecific lung nodules in the upper lobes bilaterally measuring up to 5 mm in the left upper lobe (image 55 series 3); if the patient's is in the high risk category, follow-up chest CT may be pursued in 12 months to ensure stability. Small bilateral atelectasis.    Limited sections through the upper abdomen again demonstrate nodular contour of the liver, suggestive of cirrhosis. Cholelithiasis. Large ascites again noted. IV contrast excretion in the left collecting system from recent contrast abdominal CT.    Ankylosing spondylitis. Apparent nondisplaced acute horizontal fracture at the inferior T5 vertebra with involvement of the inferior endplate (image 96 series7 and image 87 series 9).    Impression: Apparent nondisplaced acute horizontal fracture at the inferior T5 vertebra with involvement of the inferior endplate.    A few small lung nodules in the upper lobes bilaterally; if the patient's is in the high risk category (i.e. smoker), follow-up chest CT may be pursued in 12 months to ensure stability.    No evidence of pneumothorax, pulmonary consolidation or mass.    Small linear densities in the trachea, likely representing mucus secretion.    Mild bilateral pleural effusions.    Mild pericardial effusion.    ***Please see the addendum at the top of this report. It may contain additional important information or changes.****        ROSSI HOBSON M.D., ATTENDING RADIOLOGIST  This document has been electronically signed. Oct 13 2020 10:05AM  Addend:ROSSI HOBSON M.D., ATTENDING RADIOLOGIST  This addendum was electronically signed on: Oct 13 2020 11:20AM.    < end of copied text >     · Subjective and Objective:   History of Present Illness:  Reason for Admission: SYNCOPE  History of Present Illness:   80M from home, self-ambulating with PMH of chronic anemia, DM, chronic suprapubic catheter, chronic leg swelling, comes to the ED after a syncopal episode today. Pt was in his usual state of health until 4 days ago when he first noted black tarry stool. He had a repeat episode the nest day and has been feeling weak and lethargic since then. He called his doctor friends in [place] who suggested him to visit ED. Today morning when he got off from his bed he was noted to be dizzy and then fell down. Pt doesn't remember distinctly but thinks he had LOC. Pt denies any chest pain, shortness of breath, palpitations, N/V, Abd pain, diarrhea or any NSAID or blood thinner use.   Pt stays alone at home. 8 months ago his wife was diagnosed with Brain cancer and is admitted in the [facility].   Pt denies any smoking, alcohol or any form of substance abuse.     Hem/OncHx: Pt states Dx with Melanoma 2014, treated with resection only. Prostate Cancer Dx 2015, s/p radiation, no further tx given and in remission. Pt states he has long hx of anemia, but no prior transfusions. Dx with cirrhosis 2017 pt states due to Hepatitis (type unknown) and follows with Hepatology at [facility], on ?lasix, paracentesis approx q 6 months.     Interval: Pt seen and examined at bedside. Pt concerned about being discharged because he has LE weakness. On venofer, Hgb stable. S/p GI w/u 10/14 +esophageal varices    REVIEW OF SYSTEMS  Constitutional:   No fever, + fatigue, + pallor, no night sweats, no weight loss.  HEENT:   No eye pain, no vision changes, no icterus, no mouth ulcers.  Respiratory:   No shortness of breath, no cough, no respiratory distress.   Cardiovascular:   No chest pain, no palpitations.   Gastrointestinal: +abdominal distention, No abdominal pain, no nausea, no vomiting , no diarrhea, no constipation, no hematochezia, + melena.  Skin:   No rashes, no jaundice, no eczema.   Musculoskeletal:   No joint pain, + LE swelling, no myalgia.   Neurologic:   No headache, no seizure, no weakness.   Genitourinary:   No dysuria, no decreased urine output.  Psychiatric:  No depression, no anxiety,   Endocrine:   No thyroid disease, no diabetes.  Heme/Lymphatic:   long-term hx anemia, no hx of transfusions, no lymph node enlargement, no bleeding, no bruising.  ___________________________________________________________________________________________  Allergies: No Known Allergies    Home Medications:  Pt states he takes a water pill two days/week (prescribed by Hepatologist)    MEDICATIONS  (STANDING):  cefTRIAXone   IVPB      insulin lispro (HumaLOG) corrective regimen sliding scale   SubCutaneous three times a day before meals  pantoprazole  Injectable 40 milliGRAM(s) IV Push two times a day    Patient History:     PAST MEDICAL HISTORY:  Anemia   DM (diabetes mellitus)  cirrhosis  Melanoma  Prostate CA    Social History: Pt stays alone at home. 8 months ago his wife was diagnosed with Brain cancer and is admitted in the [facility]. Pt usually gets his food delivered  Denies ETOH    FamHx: denies any bleeding disorders or cancer diagnosis    Physical Exam  General:  awake, pallor, NAD.  HEENT:    Normal appearance of conjunctiva, ears, nose, lips, oropharynx, and oral mucosa, anicteric.  Neck:  No masses, no asymmetry.  Lymph Nodes:  No lymphadenopathy.   Cardiovascular:  S1/S2, no murmur.  Respiratory:  CTA B/L, normal respiratory effort.   Abdominal:   tense, distended, BS +, unable to assess for hepatosplenomegaly  Extremities:   B/L LE pitting edema L>R with LLE + tenderness  Skin:   No rash, jaundice    Neuro: No focal deficits.     Labs:    CBC Full  -  ( 15 Oct 2020 06:42 )  WBC Count : 6.99 K/uL  RBC Count : 2.98 M/uL  Hemoglobin : 8.2 g/dL  Hematocrit : 24.3 %  Platelet Count - Automated : 256 K/uL  Mean Cell Volume : 81.5 fl  Mean Cell Hemoglobin : 27.5 pg  Mean Cell Hemoglobin Concentration : 33.7 gm/dL  Auto Neutrophil # : x  Auto Lymphocyte # : x  Auto Monocyte # : x  Auto Eosinophil # : x  Auto Basophil # : x  Auto Neutrophil % : x  Auto Lymphocyte % : x  Auto Monocyte % : x  Auto Eosinophil % : x  Auto Basophil % : x    10-15    133<L>  |  99  |  17  ----------------------------<  179<H>  4.1   |  29  |  1.16    Ca    8.0<L>      15 Oct 2020 06:42    TPro  5.8<L>  /  Alb  2.2<L>  /  TBili  0.6  /  DBili  x   /  AST  20  /  ALT  18  /  AlkPhos  56  10-15    < from: CT Angio Abdomen and Pelvis w/ IV Cont (10.12.20 @ 18:15) >  EXAM:  CT ANGIO ABD PELV (W)AW IC                            PROCEDURE DATE:  10/12/2020          INTERPRETATION:  CLINICAL INFORMATION: GI bleeding. Abdominal pain.    COMPARISON: None.    PROCEDURE:  CT of the Abdomen and Pelvis was performed with and without intravenous contrast.  Precontrast, Arterial and Delayed phases were performed.  Intravenous contrast: 90 ml Omnipaque 350. 10 ml discarded.  Oral contrast: None.  Sagittal and coronal reformats were performed.    FINDINGS:  LOWER CHEST:Trace bilateral pleural effusion and trace pericardial effusion.    LIVER: Shrunken liver with nodularity likely indicating cirrhosis.  BILE DUCTS: Normal caliber.  GALLBLADDER: Multiple gallstones without evidence of cholecystitis.  SPLEEN: Within normal limits.  PANCREAS: Within normal limits.  ADRENALS: Within normal limits.  KIDNEYS/URETERS: Right extrarenal pelvis with mild hydroureteronephrosis tracing to the level of the junction of the mid and distal ureter. No radiopaque stone is identified.    BLADDER: Suprapubic catheter in place. Collapsed.  REPRODUCTIVE ORGANS: Surgical clips. Prostatectomy?    BOWEL: No bowel obstruction. Appendix is not visualized.  PERITONEUM: Massive ascites. No hemoperitoneum.  VESSELS: No active extravasation. Prominent portal vein and superior mesenteric vein with small sized splenic vein. Extensive collateral veins around the stomach, spleen and mesentery in the left mid abdomen (19-and 90). Moderate atherosclerotic disease.  RETROPERITONEUM/LYMPH NODES: No lymphadenopathy.  ABDOMINAL WALL: Mild anasarca.  BONES: Degenerative changes.    IMPRESSION:  No active extravasation.    Cirrhotic liver with stigmata of portal hypertension with extensive left gastric, splenic and mesenteric varices.    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    Mild right hydroureteronephrosis without demonstratable obstructing lesions.    Multiple gallstones.            SARAH GUAJARDO M.D., ATTENDING RADIOLOGIST  This document has been electronically signed. Oct 12 2020  6:51PM    < end of copied text >  < from: CT Chest No Cont (10.13.20 @ 09:27) >    EXAM:  CT CHEST                            *** ADDENDUM 10/13/2020  ***    NP Mr. Del Toro is informed.    *** END OF ADDENDUM 10/13/2020  ***      PROCEDURE DATE:  10/13/2020          INTERPRETATION:  Chest CT without IV contrast    Indication: Dyspnea.    Technique: Axial multidetector CT images of the chest are acquired without IV contrast.    Comparison: No prior chest CT is available for comparison. Reference is made with a previous abdominal CT dated 10/12/2020.    Findings: Mild bilateral pleural effusions. Mild pericardial effusion. The heart is not enlarged. Aortic, coronary artery, and mitral annular calcifications are present. Mild ectasia of the ascending aorta at 3.7 cm in diameter. Allowing for the noncontrast technique, there isno grossly enlarged mediastinal, hilar, or axillary lymph node.    The trachea and central bronchi are patent. Small linear densities in the trachea, likely representing mucus secretion.    No evidence of pneumothorax, pulmonary consolidation or mass. Small calcified granuloma in the right upper lobe. A few small noncalcified nonspecific lung nodules in the upper lobes bilaterally measuring up to 5 mm in the left upper lobe (image 55 series 3); if the patient's is in the high risk category, follow-up chest CT may be pursued in 12 months to ensure stability. Small bilateral atelectasis.    Limited sections through the upper abdomen again demonstrate nodular contour of the liver, suggestive of cirrhosis. Cholelithiasis. Large ascites again noted. IV contrast excretion in the left collecting system from recent contrast abdominal CT.    Ankylosing spondylitis. Apparent nondisplaced acute horizontal fracture at the inferior T5 vertebra with involvement of the inferior endplate (image 96 series7 and image 87 series 9).    Impression: Apparent nondisplaced acute horizontal fracture at the inferior T5 vertebra with involvement of the inferior endplate.    A few small lung nodules in the upper lobes bilaterally; if the patient's is in the high risk category (i.e. smoker), follow-up chest CT may be pursued in 12 months to ensure stability.    No evidence of pneumothorax, pulmonary consolidation or mass.    Small linear densities in the trachea, likely representing mucus secretion.    Mild bilateral pleural effusions.    Mild pericardial effusion.    ***Please see the addendum at the top of this report. It may contain additional important information or changes.****        ROSSI HOBSON M.D., ATTENDING RADIOLOGIST  This document has been electronically signed. Oct 13 2020 10:05AM  Addend:ROSSI HOBSON M.D., ATTENDING RADIOLOGIST  This addendum was electronically signed on: Oct 13 2020 11:20AM.    < end of copied text >    < from: US Duplex Venous Lower Ext Ltd, Left (10.13.20 @ 16:17) >  EXAM:  US DPLX LWR EXT VEINS LTD LT                            PROCEDURE DATE:  10/13/2020          INTERPRETATION:  CLINICAL INFORMATION: Left lower extremity swelling    COMPARISON: None available.    TECHNIQUE: Duplex sonography of the LEFT LOWER extremity veins with color and spectral Doppler, with and without compression. Note that the left lung gastrocnemius veins are not visualized.    FINDINGS:    There is normal compressibility of the left common femoral, femoral and popliteal veins.  Doppler examination shows normal spontaneous and phasic flow.    No calf vein thrombosis is detected. Calf edema is present.    IMPRESSION:  No evidence of left lower extremity deep venous thrombosis.   Please note, there is limited visualization of the left calf veins due to calf edema.  Therefore, if  clinical concern persists, reevaluation can be performed after 5 to 7 days to evaluate for propagation of clot.                KATRIN WHITE M.D., ATTENDING RADIOLOGIST  This document has been electronically signed. Oct 13 2020  4:33PM    < end of copied text >    < from: Transthoracic Echocardiogram (10.14.20 @ 09:40) >    Patient name: AYE MELENDEZ  YOB: 1940   Age: 80 (M)   MR#: [id number]  Study Date: 10/14/2020  Location: [address] [place], [place] [address]ographer: Oscar Ramon RDCS  Study quality: Fair  Referring Physician:  SILAS DIAZ MD  Blood Pressure: 146/67 mmHg  Height: 183 cm  Weight: 77 kg  BSA: 2 m2  ------------------------------------------------------------------------    PROCEDURE: Transthoracic echocardiogram with 2-D, M-Mode  and complete spectral and color flow Doppler.  INDICATION: Syncope and Collapse (R55)  HISTORY:  ------------------------------------------------------------------------  DIMENSIONS:  Dimensions:     Normal Values:  LA:     3.8 cm    2.0 - 4.0 cm  Ao:     3.1 cm    2.0 - 3.8 cm  SEPTUM: 1.2 cm    0.6 - 1.2 cm  PWT:    1.0 cm 0.6 - 1.1 cm  LVIDd:  3.4 cm    3.0 - 5.6 cm  LVIDs:  2.4 cm    1.8 - 4.0 cm      Derived Variables:  LVMI: 57 g/m2  RWT: 0.58  Ejection Fraction Visual Estimate: >55 %    ------------------------------------------------------------------------  OBSERVATIONS:  Mitral Valve: Mitral annular calcification. Trace mitral  regurgitation.  Aortic Root: Normal aortic root.  Aortic Valve: Calcified aortic valve with normal opening.  Left Atrium: Normal left atrium.  LA volume index = 26  cc/m2.  Left Ventricle: Normal left ventricular systolic function.   Mild diastolic dysfunction (stage I). Increased relative  wall thickness with normal left ventricular (LV) mass  index, consistent with concentric LV remodeling.  Right Heart: Normal right atrium. Normal right ventricular  size and function. There is mild tricuspid regurgitation.  There is trace pulmonic regurgitation.  Pericardium/PleuraTrivial pericardial effusion.  Hemodynamic: RA Pressure is 8 mm Hg. RV systolic pressure  is 24 mm Hg.  ------------------------------------------------------------------------  CONCLUSIONS:  1. Mitral annular calcification. Trace mitral  regurgitation.  2. Increased relative wall thickness with normal left  ventricular (LV) mass index, consistent with concentric LV  remodeling.  3. Normal left ventricular systolic function.   Mild  diastolic dysfunction (stage I).  4. Normal right ventricular size and function.  5. Trivial pericardial effusion.    ------------------------------------------------------------------------  Confirmed on  10/14/2020 - 13:32:32 by Lele Sanchez MD  ------------------------------------------------------------------------    < end of copied text >  < from: US Duplex Carotid Arteries Complete, Bilateral (10.13.20 @ 11:46) >    EXAM:  US DPLX CAROTIDS COMPL BI                            PROCEDURE DATE:  10/13/2020          INTERPRETATION:  ULTRASOUND OF THE CAROTID ARTERIES    CLINICAL INDICATION: Syncope.    TECHNIQUE:   Doppler ultrasonography of the carotid arteries was performed utilizing grayscale, color and spectral Doppler.   The magnitude of stenosis was estimated based on established tables of systolic peak velocity related to the magnitude of carotid stenosis.    COMPARISON: No prior studies available for comparison.    FINDINGS:  Atherosclerotic plaques are identified within the bilateral common carotid arteries, carotid bulbs, as well as within the bilateral internal and external carotid arteries.    Antegrade flow is present in both vertebral arteries.    Velocities expressed in centimeters per second are as follows:    RIGHT:  Proximal CCA = 95.7; Distal CCA = 98.3; Proximal ICA = 209.3; Distal ICA = 106.6;  ECA = 143.8  LEFT:    Proximal CCA = 97.0; Distal CCA = 115.9; Proximal ICA = 90.4; Distal ICA = 95.6;  ECA = 104.8    Right peak systolic IC/CC velocity ratio: 2.1  Left peak systolic IC/CC velocity ratio: 0.8    IMPRESSION:  1.  Right carotid system:  50-69% right ICA diameter stenosis.  2.  Left carotid system:  No hemodynamically significant stenosis is found.  3. CTA/MRA evaluation can be performed as clinically desired.            EMILIE URENA M.D., ATTENDING RADIOLOGIST  This document has been electronically signed. Oct 13 2020 11:59AM    < end of copied text >    < from: CT Chest No Cont (10.13.20 @ 09:27) >  EXAM:  CT CHEST                            *** ADDENDUM 10/13/2020  ***    NP Mr. Del Toro is informed.    *** END OF ADDENDUM 10/13/2020  ***      PROCEDURE DATE:  10/13/2020          INTERPRETATION:  Chest CT without IV contrast    Indication: Dyspnea.    Technique: Axial multidetector CT images of the chest are acquired without IV contrast.    Comparison: No prior chest CT is available for comparison. Reference is made with a previous abdominal CT dated 10/12/2020.    Findings: Mild bilateral pleural effusions. Mild pericardial effusion. The heart is not enlarged. Aortic, coronary artery, and mitral annular calcifications are present. Mild ectasia of the ascending aorta at 3.7 cm in diameter. Allowing for the noncontrast technique, there isno grossly enlarged mediastinal, hilar, or axillary lymph node.    The trachea and central bronchi are patent. Small linear densities in the trachea, likely representing mucus secretion.    No evidence of pneumothorax, pulmonary consolidation or mass. Small calcified granuloma in the right upper lobe. A few small noncalcified nonspecific lung nodules in the upper lobes bilaterally measuring up to 5 mm in the left upper lobe (image 55 series 3); if the patient's is in the high risk category, follow-up chest CT may be pursued in 12 months to ensure stability. Small bilateral atelectasis.    Limited sections through the upper abdomen again demonstrate nodular contour of the liver, suggestive of cirrhosis. Cholelithiasis. Large ascites again noted. IV contrast excretion in the left collecting system from recent contrast abdominal CT.    Ankylosing spondylitis. Apparent nondisplaced acute horizontal fracture at the inferior T5 vertebra with involvement of the inferior endplate (image 96 series7 and image 87 series 9).    Impression: Apparent nondisplaced acute horizontal fracture at the inferior T5 vertebra with involvement of the inferior endplate.    A few small lung nodules in the upper lobes bilaterally; if the patient's is in the high risk category (i.e. smoker), follow-up chest CT may be pursued in 12 months to ensure stability.    No evidence of pneumothorax, pulmonary consolidation or mass.    Small linear densities in the trachea, likely representing mucus secretion.    Mild bilateral pleural effusions.    Mild pericardial effusion.    ***Please see the addendum at the top of this report. It may contain additional important information or changes.****        ROSSI HOBSON M.D., ATTENDING RADIOLOGIST  This document has been electronically signed. Oct 13 2020 10:05AM  Addend:ROSSI HOBSON M.D., ATTENDING RADIOLOGIST  This addendum was electronically signed on: Oct 13 2020 11:20AM.    < end of copied text >

## 2020-10-15 NOTE — PROGRESS NOTE ADULT - PROBLEM SELECTOR PLAN 5
No PMH of KENNY  most likely due to anemia  Nephrology dr. Forman onboard  CR 1.16 improving  f/u cmp in AM. No PMH of KENNY  most likely due to anemia  Nephrology dr. Forman onboard  CR 1.16 improving  pt has supra pubic catheter  18Fr SPT to be changed at bedside by urology  f/u cmp in AM.

## 2020-10-16 LAB
-  AMIKACIN: SIGNIFICANT CHANGE UP
-  AMOXICILLIN/CLAVULANIC ACID: SIGNIFICANT CHANGE UP
-  AMPICILLIN/SULBACTAM: SIGNIFICANT CHANGE UP
-  AMPICILLIN: SIGNIFICANT CHANGE UP
-  AZTREONAM: SIGNIFICANT CHANGE UP
-  CEFAZOLIN: SIGNIFICANT CHANGE UP
-  CEFEPIME: SIGNIFICANT CHANGE UP
-  CEFOXITIN: SIGNIFICANT CHANGE UP
-  CEFTRIAXONE: SIGNIFICANT CHANGE UP
-  CIPROFLOXACIN: SIGNIFICANT CHANGE UP
-  ERTAPENEM: SIGNIFICANT CHANGE UP
-  GENTAMICIN: SIGNIFICANT CHANGE UP
-  IMIPENEM: SIGNIFICANT CHANGE UP
-  LEVOFLOXACIN: SIGNIFICANT CHANGE UP
-  MEROPENEM: SIGNIFICANT CHANGE UP
-  NITROFURANTOIN: SIGNIFICANT CHANGE UP
-  PIPERACILLIN/TAZOBACTAM: SIGNIFICANT CHANGE UP
-  TIGECYCLINE: SIGNIFICANT CHANGE UP
-  TOBRAMYCIN: SIGNIFICANT CHANGE UP
-  TRIMETHOPRIM/SULFAMETHOXAZOLE: SIGNIFICANT CHANGE UP
ALBUMIN SERPL ELPH-MCNC: 2 G/DL — LOW (ref 3.5–5)
ALP SERPL-CCNC: 55 U/L — SIGNIFICANT CHANGE UP (ref 40–120)
ALT FLD-CCNC: 17 U/L DA — SIGNIFICANT CHANGE UP (ref 10–60)
ANION GAP SERPL CALC-SCNC: 3 MMOL/L — LOW (ref 5–17)
AST SERPL-CCNC: 19 U/L — SIGNIFICANT CHANGE UP (ref 10–40)
BILIRUB SERPL-MCNC: 0.9 MG/DL — SIGNIFICANT CHANGE UP (ref 0.2–1.2)
BUN SERPL-MCNC: 16 MG/DL — SIGNIFICANT CHANGE UP (ref 7–18)
CALCIUM SERPL-MCNC: 8 MG/DL — LOW (ref 8.4–10.5)
CHLORIDE SERPL-SCNC: 101 MMOL/L — SIGNIFICANT CHANGE UP (ref 96–108)
CO2 SERPL-SCNC: 28 MMOL/L — SIGNIFICANT CHANGE UP (ref 22–31)
CREAT SERPL-MCNC: 1.04 MG/DL — SIGNIFICANT CHANGE UP (ref 0.5–1.3)
CULTURE RESULTS: SIGNIFICANT CHANGE UP
GLUCOSE BLDC GLUCOMTR-MCNC: 193 MG/DL — HIGH (ref 70–99)
GLUCOSE BLDC GLUCOMTR-MCNC: 208 MG/DL — HIGH (ref 70–99)
GLUCOSE BLDC GLUCOMTR-MCNC: 235 MG/DL — HIGH (ref 70–99)
GLUCOSE BLDC GLUCOMTR-MCNC: 235 MG/DL — HIGH (ref 70–99)
GLUCOSE BLDC GLUCOMTR-MCNC: 257 MG/DL — HIGH (ref 70–99)
GLUCOSE SERPL-MCNC: 186 MG/DL — HIGH (ref 70–99)
HCT VFR BLD CALC: 25.2 % — LOW (ref 39–50)
HGB BLD-MCNC: 8.3 G/DL — LOW (ref 13–17)
MCHC RBC-ENTMCNC: 27.7 PG — SIGNIFICANT CHANGE UP (ref 27–34)
MCHC RBC-ENTMCNC: 32.9 GM/DL — SIGNIFICANT CHANGE UP (ref 32–36)
MCV RBC AUTO: 84 FL — SIGNIFICANT CHANGE UP (ref 80–100)
METHOD TYPE: SIGNIFICANT CHANGE UP
NRBC # BLD: 0 /100 WBCS — SIGNIFICANT CHANGE UP (ref 0–0)
ORGANISM # SPEC MICROSCOPIC CNT: SIGNIFICANT CHANGE UP
PLATELET # BLD AUTO: 264 K/UL — SIGNIFICANT CHANGE UP (ref 150–400)
POTASSIUM SERPL-MCNC: 4.2 MMOL/L — SIGNIFICANT CHANGE UP (ref 3.5–5.3)
POTASSIUM SERPL-SCNC: 4.2 MMOL/L — SIGNIFICANT CHANGE UP (ref 3.5–5.3)
PROT SERPL-MCNC: 5.4 G/DL — LOW (ref 6–8.3)
RBC # BLD: 3 M/UL — LOW (ref 4.2–5.8)
RBC # FLD: 18.6 % — HIGH (ref 10.3–14.5)
SODIUM SERPL-SCNC: 132 MMOL/L — LOW (ref 135–145)
SPECIMEN SOURCE: SIGNIFICANT CHANGE UP
WBC # BLD: 6 K/UL — SIGNIFICANT CHANGE UP (ref 3.8–10.5)
WBC # FLD AUTO: 6 K/UL — SIGNIFICANT CHANGE UP (ref 3.8–10.5)

## 2020-10-16 RX ORDER — TRAMADOL HYDROCHLORIDE 50 MG/1
1 TABLET ORAL
Qty: 14 | Refills: 0
Start: 2020-10-16 | End: 2020-10-22

## 2020-10-16 RX ORDER — LANOLIN ALCOHOL/MO/W.PET/CERES
3 CREAM (GRAM) TOPICAL AT BEDTIME
Refills: 0 | Status: DISCONTINUED | OUTPATIENT
Start: 2020-10-16 | End: 2020-10-17

## 2020-10-16 RX ORDER — NADOLOL 80 MG/1
1 TABLET ORAL
Qty: 30 | Refills: 0
Start: 2020-10-16 | End: 2020-11-14

## 2020-10-16 RX ADMIN — Medication 3: at 12:06

## 2020-10-16 RX ADMIN — Medication 2: at 17:00

## 2020-10-16 RX ADMIN — PANTOPRAZOLE SODIUM 40 MILLIGRAM(S): 20 TABLET, DELAYED RELEASE ORAL at 17:31

## 2020-10-16 RX ADMIN — MORPHINE SULFATE 2 MILLIGRAM(S): 50 CAPSULE, EXTENDED RELEASE ORAL at 20:50

## 2020-10-16 RX ADMIN — Medication 1: at 08:16

## 2020-10-16 RX ADMIN — IRON SUCROSE 110 MILLIGRAM(S): 20 INJECTION, SOLUTION INTRAVENOUS at 12:02

## 2020-10-16 RX ADMIN — MORPHINE SULFATE 2 MILLIGRAM(S): 50 CAPSULE, EXTENDED RELEASE ORAL at 20:20

## 2020-10-16 RX ADMIN — PANTOPRAZOLE SODIUM 40 MILLIGRAM(S): 20 TABLET, DELAYED RELEASE ORAL at 05:22

## 2020-10-16 RX ADMIN — Medication 3 MILLIGRAM(S): at 22:10

## 2020-10-16 NOTE — PROGRESS NOTE ADULT - PROBLEM SELECTOR PLAN 2
Pt admitted for symptomatic anemia  Patient has hx of chronic anemia secondary to liver cirrhosis  given 3U PRBC   hematology [facility] onboard  Venofor 200mg IVPB daily x 4 days  f/u CBC q 6-8 h
Pt admitted for symptomatic anemia  Patient has hx of chronic anemia secondary to liver cirrhosis  given 3U PRBC   hematology [facility] onboard  Venofor 200mg IVPB daily x 4 days  f/u CBC q 6-8 h.
Patient has PMH of liver cirrhosis  CT- Cirrhotic liver with portal HTN, varices   oncology [facility] onboard  GI dr. Alarcon onboard  US abd + moderate ascites
Pt admitted for symptomatic anemia  Patient has hx of chronic anemia secondary to liver cirrhosis  given 3U PRBC   hematology [facility] onboard  Venofor 200mg IVPB daily x 4 days  f/u CBC in AM
Reinforced with patient the need for chronic liver disease workup with his PCP . He would like ot go home.   D/C on nadolol for diagnosed varices
ECHO noted   Appreciate cardiology input

## 2020-10-16 NOTE — PROGRESS NOTE ADULT - ASSESSMENT
80M from home, self-ambulating with PMH of chronic anemia, DM, chronic suprapubic catheter, chronic leg swelling, comes to the ED after a syncopal episode   nephrology consulted for IAN      Ian  scr 1.8 on admission   Ian likely sec to severe anemia (Hb ~3)  s/p 3 units pRBC   Renal function improved   s/p Ct A/P with contrast on 10/12-- Monitor for JACK  Monitor at present  Avoid further nephrotoxics, NSAIDs RCa    Proteinuria/ hematuria  in setting of UTI  Repeat UA after treatment    Right hydroureteronephrosis  Ct A/P with right hydroureteronephrosis  Urology follow up    LE edema  Left doppler neg for DVT  ECHo EF >55  Consider lasix

## 2020-10-16 NOTE — PROGRESS NOTE ADULT - PROBLEM SELECTOR PLAN 4
No PMH of KENNY  most likely due to anemia  Nephrology dr. Forman onboard  CR 1.16 improving  pt has supra pubic catheter  18Fr SPT to be changed at bedside by urology  f/u cmp in AM.

## 2020-10-16 NOTE — PROGRESS NOTE ADULT - SUBJECTIVE AND OBJECTIVE BOX
[facility]   MD FIDELINA LEDESMA MD RUORU WONG, PA    TEL:  OFFICE: [phone]  DR CHAMBERS CELL: [phone]  RAMIRO HERNANDEZ CELL: [phone]  DR. THOMAS CELL: [phone]  DR. CRABTREE CELL: [phone]    FROM 5 PM - 7 AM PLEASE CALL ANSWERING SERVICE: [phone]    RENAL FOLLOW UP NOTE--Date of Service 10-16-20 @ 10:50  --------------------------------------------------------------------------------  HPI:      Pt seen and examined at bedside.       PAST HISTORY  --------------------------------------------------------------------------------  No significant changes to PMH, PSH, FHx, SHx, unless otherwise noted    ALLERGIES & MEDICATIONS  --------------------------------------------------------------------------------  Allergies    No Known Allergies    Intolerances      Standing Inpatient Medications  insulin lispro (HumaLOG) corrective regimen sliding scale   SubCutaneous three times a day before meals  iron sucrose IVPB 200 milliGRAM(s) IV Intermittent once  melatonin 3 milliGRAM(s) Oral at bedtime  pantoprazole  Injectable 40 milliGRAM(s) IV Push two times a day    PRN Inpatient Medications  lidocaine 2% Gel 1 Application(s) Topical every 6 hours PRN  morphine  - Injectable 2 milliGRAM(s) IV Push every 6 hours PRN      REVIEW OF SYSTEMS  --------------------------------------------------------------------------------  General: no fever    MSK: + edema     VITALS/PHYSICAL EXAM  --------------------------------------------------------------------------------  T(C): 36.2 (10-16-20 @ 05:20), Max: 37.3 (10-15-20 @ 21:09)  HR: 92 (10-16-20 @ 05:20) (75 - 92)  BP: 135/50 (10-16-20 @ 05:20) (112/75 - 140/59)  RR: 17 (10-16-20 @ 05:20) (17 - 18)  SpO2: 98% (10-16-20 @ 05:20) (98% - 100%)  Wt(kg): --        10-15-20 @ 07:01  -  10-16-20 @ 07:00  --------------------------------------------------------  IN: 0 mL / OUT: 450 mL / NET: -450 mL      Physical Exam:  	Gen: NAD  	HEENT: MMM  	Pulm: CTA B/L  	CV: S1S2  	Abd: Soft, +BS  	Ext: + LE edema B/L                      Neuro: Awake   	Skin: Warm and Dry   	Vascular access: no hd catheter           supra punib catheter  LABS/STUDIES  --------------------------------------------------------------------------------              8.3    6.00  >-----------<  264      [10-16-20 @ 06:39]              25.2     132  |  101  |  16  ----------------------------<  186      [10-16-20 @ 06:39]  4.2   |  28  |  1.04        Ca     8.0     [10-16-20 @ 06:39]    TPro  5.4  /  Alb  2.0  /  TBili  0.9  /  DBili  x   /  AST  19  /  ALT  17  /  AlkPhos  55  [10-16-20 @ 06:39]          Creatinine Trend:  SCr 1.04 [10-16 @ 06:39]  SCr 1.16 [10-15 @ 06:42]  SCr 1.29 [10-14 @ 06:37]  SCr 1.43 [10-13 @ 04:26]  SCr 1.84 [10-12 @ 16:50]    Urinalysis - [10-12-20 @ 17:24]      Color Yellow / Appearance Slightly Turbid / SG 1.020 / pH 5.0      Gluc Negative / Ketone Negative  / Bili Negative / Urobili Negative       Blood Moderate / Protein 100 / Leuk Est Moderate / Nitrite Negative      RBC 2-5 / WBC 26-50 / Hyaline  / Gran  / Sq Epi  / Non Sq Epi Occasional / Bacteria Few      Iron 11, TIBC 316, %sat 4      [10-12-20 @ 16:50]  Ferritin 13      [10-13-20 @ 09:35]  TSH 6.58      [10-13-20 @ 04:26]  Lipid: chol 110, TG 89, HDL 25, LDL 67      [10-13-20 @ 04:26]    HBsAg Nonreact      [10-13-20 @ 11:55]  HCV 0.14, Nonreact      [10-13-20 @ 11:55]    GEO: titer Negative, pattern --      [10-13-20 @ 21:48]  Rheumatoid Factor <10      [10-14-20 @ 00:22]  SPEP Interpretation: Hypoalbuminemia      [10-13-20 @ 21:48]

## 2020-10-16 NOTE — PROGRESS NOTE ADULT - ASSESSMENT
Patient is 81y/o male from home,  with PMH of chronic anemia, DM, chronic suprapubic catheter, chronic leg swelling, comes to the ED after a syncopal episode. Pt found to have hemoglobin of 3.6.  Patient was admitted to medicine for systematic anemia and syncopal episode.  Patient received total of  3 U PRBC for low hemoglobin with improvement of h/h to 8.1/23.4. Patient had abdominal CT angio done showing Cirrhotic liver with stigmata of portal hypertension.  GI dr. Alarcon consulted. Patient underwent  EGD and colonoscopy that showed no acute finding or active bleeding.   Patient was also  followed by heme/oncology team for anemia and nephrology  for KENNY   Transthoracic echo showed mild (stage 1) diastolic disfunction, EF >55%.   Urology was consulted for suprapubic catheter change. New 18 Fr SPT was inserted and patient was advised to follow up with urology on outpatient bases with Dr Meade.  Creatinine WNL   Hemoglobin remained stable and there were no signs of active bleeding   Patient was evaluated by Physical Therapy with recommendation for home PT, rolling walker and shower chair.     Pt seen at bedside, NAD, no new complaints, no SOB< chest pain, abd pain. VS stable, Hemoglobin remains stable

## 2020-10-16 NOTE — PROGRESS NOTE ADULT - SUBJECTIVE AND OBJECTIVE BOX
Patient denies chest pain or shortness of breath.   Review of systems otherwise (-)  	  insulin lispro (HumaLOG) corrective regimen sliding scale   SubCutaneous three times a day before meals  iron sucrose IVPB 200 milliGRAM(s) IV Intermittent once  lidocaine 2% Gel 1 Application(s) Topical every 6 hours PRN  melatonin 3 milliGRAM(s) Oral at bedtime  morphine  - Injectable 2 milliGRAM(s) IV Push every 6 hours PRN  pantoprazole  Injectable 40 milliGRAM(s) IV Push two times a day                            8.3    6.00  )-----------( 264      ( 16 Oct 2020 06:39 )             25.2       Hemoglobin: 8.3 g/dL (10-16 @ 06:39)  Hemoglobin: 8.2 g/dL (10-15 @ 06:42)  Hemoglobin: 8.4 g/dL (10-14 @ 16:01)  Hemoglobin: 7.5 g/dL (10-14 @ 06:37)  Hemoglobin: 7.9 g/dL (10-14 @ 00:09)      10-16    132<L>  |  101  |  16  ----------------------------<  186<H>  4.2   |  28  |  1.04    Ca    8.0<L>      16 Oct 2020 06:39    TPro  5.4<L>  /  Alb  2.0<L>  /  TBili  0.9  /  DBili  x   /  AST  19  /  ALT  17  /  AlkPhos  55  10-16    Creatinine Trend: 1.04<--, 1.16<--, 1.29<--, 1.43<--, 1.84<--    COAGS:           T(C): 36.2 (10-16-20 @ 05:20), Max: 37.3 (10-15-20 @ 21:09)  HR: 92 (10-16-20 @ 05:20) (75 - 92)  BP: 135/50 (10-16-20 @ 05:20) (112/75 - 140/59)  RR: 17 (10-16-20 @ 05:20) (17 - 18)  SpO2: 98% (10-16-20 @ 05:20) (98% - 100%)  Wt(kg): --    I&O's Summary    15 Oct 2020 07:01  -  16 Oct 2020 07:00  --------------------------------------------------------  IN: 0 mL / OUT: 450 mL / NET: -450 mL          Gen: Appears well in NAD  HEENT:  (-)icterus (-)pallor  CV: N S1 S2 1/6 AMBREEN (+)2 Pulses B/l  Resp:  Clear to ausculatation B/L, normal effort  GI: (+) BS Soft, NT, ND  Lymph:  (+)Edema, (-)obvious lymphadenopathy  Skin: Warm to touch, Normal turgor  Psych: Appropriate mood and affect      TELEMETRY: 	  OFF        ASSESSMENT/PLAN: 	80y  Male PMH of chronic anemia, Cirrhosis, DM, chronic suprapubic catheter, chronic leg swelling, comes to the ED after a syncopal episode and suspected GI bleed.      - Suspect LOC was a hemodynamically mediated event in the setting of acute blood loss anemia  - Echo with no pertinent findings  - GI f/u noted  -  evaluation noted  - MOnitor H/H  - no clinical CHF  - No need for further inpatient cardiac work up.    Lele Sanchez MD, [facility]  BEEPER [phone]

## 2020-10-16 NOTE — PROGRESS NOTE ADULT - PROBLEM SELECTOR PLAN 1
due to low hemoglobin, sp 3 units of PRBC -->h/h remains >8   Patient has hx of chronic anemia secondary to liver cirrhosis  Venofor 200mg IVPB daily x 4 days completed   monitor CBC   hematology [facility] onboard, outpatient follow up with Dr Hinojosa week after discharge

## 2020-10-16 NOTE — PROGRESS NOTE ADULT - SUBJECTIVE AND OBJECTIVE BOX
NP Note discussed with  primary attending    Patient is a 80y old  Male who presents with a chief complaint of SYNCOPY (16 Oct 2020 11:13)      INTERVAL HPI/OVERNIGHT EVENTS: no new complaints    MEDICATIONS  (STANDING):  insulin lispro (HumaLOG) corrective regimen sliding scale   SubCutaneous three times a day before meals  melatonin 3 milliGRAM(s) Oral at bedtime  pantoprazole  Injectable 40 milliGRAM(s) IV Push two times a day    MEDICATIONS  (PRN):  lidocaine 2% Gel 1 Application(s) Topical every 6 hours PRN moderate pain  morphine  - Injectable 2 milliGRAM(s) IV Push every 6 hours PRN Severe Pain (7 - 10)      __________________________________________________  REVIEW OF SYSTEMS:    CONSTITUTIONAL: No fever,   EYES: no acute visual disturbances  NECK: No pain or stiffness  RESPIRATORY: No cough; No shortness of breath  CARDIOVASCULAR: No chest pain, no palpitations  GASTROINTESTINAL: No pain. No nausea or vomiting; No diarrhea   NEUROLOGICAL: No headache or numbness, no tremors  MUSCULOSKELETAL: co legg weakness, "hard to walk"  GENITOURINARY: penile discomfort resolved   PSYCHIATRY: no depression , no anxiety  ALL OTHER  ROS negative        Vital Signs Last 24 Hrs  T(C): 36.2 (16 Oct 2020 05:20), Max: 37.3 (15 Oct 2020 21:09)  T(F): 97.1 (16 Oct 2020 05:20), Max: 99.2 (15 Oct 2020 21:09)  HR: 92 (16 Oct 2020 05:20) (75 - 92)  BP: 135/50 (16 Oct 2020 05:20) (112/75 - 140/59)  BP(mean): --  RR: 17 (16 Oct 2020 05:20) (17 - 18)  SpO2: 98% (16 Oct 2020 05:20) (98% - 100%)    ________________________________________________  PHYSICAL EXAM:  GENERAL: NAD  HEENT: Normocephalic;  conjunctivae and sclerae clear; moist mucous membranes;   NECK : supple  CHEST/LUNG: Clear to ausculitation bilaterally with good air entry   HEART: S1 S2  regular; no murmurs, gallops or rubs  ABDOMEN: Soft, Nontender, Nondistended; Bowel sounds present  : suprapubic cath   EXTREMITIES: no cyanosis; no edema; no calf tenderness  SKIN: warm and dry; no rash  NERVOUS SYSTEM:  Awake and alert; Oriented  to place, person and time ; no new deficits    _________________________________________________  LABS:                        8.3    6.00  )-----------( 264      ( 16 Oct 2020 06:39 )             25.2     10-16    132<L>  |  101  |  16  ----------------------------<  186<H>  4.2   |  28  |  1.04    Ca    8.0<L>      16 Oct 2020 06:39    TPro  5.4<L>  /  Alb  2.0<L>  /  TBili  0.9  /  DBili  x   /  AST  19  /  ALT  17  /  AlkPhos  55  10-16        CAPILLARY BLOOD GLUCOSE      POCT Blood Glucose.: 257 mg/dL (16 Oct 2020 11:41)  POCT Blood Glucose.: 193 mg/dL (16 Oct 2020 08:09)  POCT Blood Glucose.: 208 mg/dL (16 Oct 2020 06:52)  POCT Blood Glucose.: 154 mg/dL (15 Oct 2020 23:47)  POCT Blood Glucose.: 225 mg/dL (15 Oct 2020 21:22)  POCT Blood Glucose.: 283 mg/dL (15 Oct 2020 17:06)        RADIOLOGY & ADDITIONAL TESTS:    Imaging Personally Reviewed:  YES/NO    Consultant(s) Notes Reviewed:   YES/ No    Care Discussed with Consultants :     Plan of care was discussed with patient and /or primary care giver; all questions and concerns were addressed and care was aligned with patient's wishes.

## 2020-10-16 NOTE — PROGRESS NOTE ADULT - SUBJECTIVE AND OBJECTIVE BOX
Summary:   80y  Male      Subjective:   No complaints     Objective:    MEDICATIONS  (STANDING):  insulin lispro (HumaLOG) corrective regimen sliding scale   SubCutaneous three times a day before meals  melatonin 3 milliGRAM(s) Oral at bedtime  pantoprazole  Injectable 40 milliGRAM(s) IV Push two times a day    MEDICATIONS  (PRN):  lidocaine 2% Gel 1 Application(s) Topical every 6 hours PRN moderate pain  morphine  - Injectable 2 milliGRAM(s) IV Push every 6 hours PRN Severe Pain (7 - 10)              Vital Signs Last 24 Hrs  T(C): 36.7 (16 Oct 2020 14:49), Max: 37.3 (15 Oct 2020 21:09)  T(F): 98 (16 Oct 2020 14:49), Max: 99.2 (15 Oct 2020 21:09)  HR: 92 (16 Oct 2020 14:49) (90 - 92)  BP: 123/46 (16 Oct 2020 14:49) (123/46 - 140/59)  BP(mean): --  RR: 18 (16 Oct 2020 14:49) (17 - 18)  SpO2: 97% (16 Oct 2020 14:49) (97% - 100%)      General:  Well developed, well nourished, alert and active, no pallor, NAD.  HEENT:    Normal appearance of conjunctiva, ears, nose, lips, oropharynx, and oral mucosa, anicteric.  Neck:  No masses, no asymmetry.  Lymph Nodes:  No lymphadenopathy.   Cardiovascular:  RRR normal S1/S2, no murmur.  Respiratory:  CTA B/L, normal respiratory effort.   Abdominal:   soft, no masses or tenderness, normoactive BS, NT/ND, no HSM.  Extremities:   No clubbing or cyanosis, normal capillary refill, no edema.   Skin:   No rash, jaundice, lesions, eczema.   Musculoskeletal:  No joint swelling, erythema or tenderness.   Neuro: No focal deficits.   Other:       LABS:                        8.3    6.00  )-----------( 264      ( 16 Oct 2020 06:39 )             25.2     10-16    132<L>  |  101  |  16  ----------------------------<  186<H>  4.2   |  28  |  1.04    Ca    8.0<L>      16 Oct 2020 06:39    TPro  5.4<L>  /  Alb  2.0<L>  /  TBili  0.9  /  DBili  x   /  AST  19  /  ALT  17  /  AlkPhos  55  10-16          RADIOLOGY & ADDITIONAL TESTS:    · Note Type	Event Note  EGD and Colonoscopy      Colonoscopy Report  Indication: Anemia   Referring:    Instrument:    Anesthesia: MAC  Consent:  Informed consent was obtained from the patient after providing any opportunity for questions  Procedure: After placing the patient in the left lateral decubitus position, the colonoscope was gently inserted into the rectum and advanced to the cecum. Color, texture, mucosa, and anatomy of the colon were carefully examined with the scope. The patient tolerated the procedure well. After completion of the exam, the patient was transferred to the recovery room.     Preparation: Poor . BBPS R=1/2 T= 1/2 L=2   Findings:   Anal Canal	Normal  Rectum	Normal  Sigmoid Colon 	Normal  Descending Colon	Normal  Splenic Flexure	Normal  Transverse Colon	Normal  Hepatic Flexure	Normal  Ascending Colon	 Normal  Cecum	Normal  Ileo-cecal Valve	Normal  Ileum 	    EBL:0    Impression:  1- Poor prep. 2- No gross lesions 3- No evidence of bleeding     Recommendations: Refer to endoscopy report for further recommendations           Attending:       Odell Alarcon M.D.   _____________________________________________________________________________________________                                                                               Esophagogastroduodenoscopy Report      Indication: Anemia  Referring MD:   Anesthesia: MAC  Consent:  Informed consent was obtained from the patient after providing any opportunity for questions  Procedure: The gastroscope was gently passed through the incisoral orifice into the oral cavity and under direct visualization the esophagus was intubated. The endoscope was passed down the esophagus, through the stomach and into duodenum . Color, texture, mucosa and anatomy of the esophagus, stomach, and duodenum were carefully examined with the scope. The patient tolerated the procedure well. After completion of the examination, the patient was transferred to the recovery room.   Preparation: NPO   Findings:     Oropharynx	Normal    Esophagus	One large row of esophageal varix. No high risk stigmata of bleeding     EG-junction	Normal    Cardia:	Normal.    Body:	Normal     Antrum:	Mild gastritis biopsy taken [1]    Pylorus:	Normal.    Duodenal Bulb:	Normal     2nd portion:	Normal    3rd portion:	Normal.    EBL:0      Impression: 1- Esophageal varix.  2-No evidence of bleeding     Plan: 1-  Resume diet and medications 2-Start nadolol daily 3- D/C  octreotide             Attending:       Odell Alarcon M.D.   10-15-20 @ 08:22      PLEASE REFER TO PAPER CHART FOR PICTURES.      Electronic Signatures:  Odell Alarcon)  (Signed 15-Oct-2020 08:26)  	Authored: Note Type, Note      Last Updated: 15-Oct-2020 08:26 by Odell Alarcon)

## 2020-10-17 ENCOUNTER — TRANSCRIPTION ENCOUNTER (OUTPATIENT)
Age: 80
End: 2020-10-17

## 2020-10-17 VITALS
TEMPERATURE: 98 F | RESPIRATION RATE: 18 BRPM | HEART RATE: 82 BPM | DIASTOLIC BLOOD PRESSURE: 65 MMHG | SYSTOLIC BLOOD PRESSURE: 142 MMHG | OXYGEN SATURATION: 96 %

## 2020-10-17 LAB
ALBUMIN SERPL ELPH-MCNC: 2.2 G/DL — LOW (ref 3.5–5)
ALP SERPL-CCNC: 68 U/L — SIGNIFICANT CHANGE UP (ref 40–120)
ALT FLD-CCNC: 18 U/L DA — SIGNIFICANT CHANGE UP (ref 10–60)
ANION GAP SERPL CALC-SCNC: 3 MMOL/L — LOW (ref 5–17)
AST SERPL-CCNC: 21 U/L — SIGNIFICANT CHANGE UP (ref 10–40)
BILIRUB SERPL-MCNC: 0.7 MG/DL — SIGNIFICANT CHANGE UP (ref 0.2–1.2)
BUN SERPL-MCNC: 16 MG/DL — SIGNIFICANT CHANGE UP (ref 7–18)
CALCIUM SERPL-MCNC: 8.2 MG/DL — LOW (ref 8.4–10.5)
CHLORIDE SERPL-SCNC: 101 MMOL/L — SIGNIFICANT CHANGE UP (ref 96–108)
CO2 SERPL-SCNC: 30 MMOL/L — SIGNIFICANT CHANGE UP (ref 22–31)
CREAT SERPL-MCNC: 1.2 MG/DL — SIGNIFICANT CHANGE UP (ref 0.5–1.3)
GLUCOSE BLDC GLUCOMTR-MCNC: 204 MG/DL — HIGH (ref 70–99)
GLUCOSE SERPL-MCNC: 237 MG/DL — HIGH (ref 70–99)
HCT VFR BLD CALC: 26.4 % — LOW (ref 39–50)
HGB BLD-MCNC: 8.6 G/DL — LOW (ref 13–17)
MCHC RBC-ENTMCNC: 27.8 PG — SIGNIFICANT CHANGE UP (ref 27–34)
MCHC RBC-ENTMCNC: 32.6 GM/DL — SIGNIFICANT CHANGE UP (ref 32–36)
MCV RBC AUTO: 85.4 FL — SIGNIFICANT CHANGE UP (ref 80–100)
NRBC # BLD: 0 /100 WBCS — SIGNIFICANT CHANGE UP (ref 0–0)
PLATELET # BLD AUTO: 226 K/UL — SIGNIFICANT CHANGE UP (ref 150–400)
POTASSIUM SERPL-MCNC: 4.4 MMOL/L — SIGNIFICANT CHANGE UP (ref 3.5–5.3)
POTASSIUM SERPL-SCNC: 4.4 MMOL/L — SIGNIFICANT CHANGE UP (ref 3.5–5.3)
PROT SERPL-MCNC: 5.9 G/DL — LOW (ref 6–8.3)
RBC # BLD: 3.09 M/UL — LOW (ref 4.2–5.8)
RBC # FLD: 19.8 % — HIGH (ref 10.3–14.5)
SODIUM SERPL-SCNC: 134 MMOL/L — LOW (ref 135–145)
WBC # BLD: 5.89 K/UL — SIGNIFICANT CHANGE UP (ref 3.8–10.5)
WBC # FLD AUTO: 5.89 K/UL — SIGNIFICANT CHANGE UP (ref 3.8–10.5)

## 2020-10-17 PROCEDURE — 84165 PROTEIN E-PHORESIS SERUM: CPT

## 2020-10-17 PROCEDURE — 86901 BLOOD TYPING SEROLOGIC RH(D): CPT

## 2020-10-17 PROCEDURE — 86923 COMPATIBILITY TEST ELECTRIC: CPT

## 2020-10-17 PROCEDURE — 84443 ASSAY THYROID STIM HORMONE: CPT

## 2020-10-17 PROCEDURE — P9040: CPT

## 2020-10-17 PROCEDURE — 93880 EXTRACRANIAL BILAT STUDY: CPT

## 2020-10-17 PROCEDURE — 82746 ASSAY OF FOLIC ACID SERUM: CPT

## 2020-10-17 PROCEDURE — 85025 COMPLETE CBC W/AUTO DIFF WBC: CPT

## 2020-10-17 PROCEDURE — 87186 SC STD MICRODIL/AGAR DIL: CPT

## 2020-10-17 PROCEDURE — 85610 PROTHROMBIN TIME: CPT

## 2020-10-17 PROCEDURE — 83550 IRON BINDING TEST: CPT

## 2020-10-17 PROCEDURE — 86038 ANTINUCLEAR ANTIBODIES: CPT

## 2020-10-17 PROCEDURE — 83036 HEMOGLOBIN GLYCOSYLATED A1C: CPT

## 2020-10-17 PROCEDURE — 88312 SPECIAL STAINS GROUP 1: CPT

## 2020-10-17 PROCEDURE — 86769 SARS-COV-2 COVID-19 ANTIBODY: CPT

## 2020-10-17 PROCEDURE — 93971 EXTREMITY STUDY: CPT

## 2020-10-17 PROCEDURE — 85014 HEMATOCRIT: CPT

## 2020-10-17 PROCEDURE — 76705 ECHO EXAM OF ABDOMEN: CPT

## 2020-10-17 PROCEDURE — 93005 ELECTROCARDIOGRAM TRACING: CPT

## 2020-10-17 PROCEDURE — 86850 RBC ANTIBODY SCREEN: CPT

## 2020-10-17 PROCEDURE — 36430 TRANSFUSION BLD/BLD COMPNT: CPT

## 2020-10-17 PROCEDURE — 80053 COMPREHEN METABOLIC PANEL: CPT

## 2020-10-17 PROCEDURE — 84155 ASSAY OF PROTEIN SERUM: CPT

## 2020-10-17 PROCEDURE — 82272 OCCULT BLD FECES 1-3 TESTS: CPT

## 2020-10-17 PROCEDURE — 36415 COLL VENOUS BLD VENIPUNCTURE: CPT

## 2020-10-17 PROCEDURE — 87635 SARS-COV-2 COVID-19 AMP PRB: CPT

## 2020-10-17 PROCEDURE — 86431 RHEUMATOID FACTOR QUANT: CPT

## 2020-10-17 PROCEDURE — 87086 URINE CULTURE/COLONY COUNT: CPT

## 2020-10-17 PROCEDURE — 84484 ASSAY OF TROPONIN QUANT: CPT

## 2020-10-17 PROCEDURE — 83540 ASSAY OF IRON: CPT

## 2020-10-17 PROCEDURE — 88305 TISSUE EXAM BY PATHOLOGIST: CPT

## 2020-10-17 PROCEDURE — 84100 ASSAY OF PHOSPHORUS: CPT

## 2020-10-17 PROCEDURE — 85027 COMPLETE CBC AUTOMATED: CPT

## 2020-10-17 PROCEDURE — 80074 ACUTE HEPATITIS PANEL: CPT

## 2020-10-17 PROCEDURE — 82607 VITAMIN B-12: CPT

## 2020-10-17 PROCEDURE — 97162 PT EVAL MOD COMPLEX 30 MIN: CPT

## 2020-10-17 PROCEDURE — 85730 THROMBOPLASTIN TIME PARTIAL: CPT

## 2020-10-17 PROCEDURE — 85045 AUTOMATED RETICULOCYTE COUNT: CPT

## 2020-10-17 PROCEDURE — 71250 CT THORAX DX C-: CPT

## 2020-10-17 PROCEDURE — 99285 EMERGENCY DEPT VISIT HI MDM: CPT

## 2020-10-17 PROCEDURE — 85018 HEMOGLOBIN: CPT

## 2020-10-17 PROCEDURE — 80061 LIPID PANEL: CPT

## 2020-10-17 PROCEDURE — 74174 CTA ABD&PLVS W/CONTRAST: CPT

## 2020-10-17 PROCEDURE — 82962 GLUCOSE BLOOD TEST: CPT

## 2020-10-17 PROCEDURE — 82728 ASSAY OF FERRITIN: CPT

## 2020-10-17 PROCEDURE — 83735 ASSAY OF MAGNESIUM: CPT

## 2020-10-17 PROCEDURE — 83615 LACTATE (LD) (LDH) ENZYME: CPT

## 2020-10-17 PROCEDURE — 86900 BLOOD TYPING SEROLOGIC ABO: CPT

## 2020-10-17 PROCEDURE — 83010 ASSAY OF HAPTOGLOBIN QUANT: CPT

## 2020-10-17 PROCEDURE — 93306 TTE W/DOPPLER COMPLETE: CPT

## 2020-10-17 PROCEDURE — 81001 URINALYSIS AUTO W/SCOPE: CPT

## 2020-10-17 PROCEDURE — 83605 ASSAY OF LACTIC ACID: CPT

## 2020-10-17 RX ADMIN — PANTOPRAZOLE SODIUM 40 MILLIGRAM(S): 20 TABLET, DELAYED RELEASE ORAL at 07:08

## 2020-10-17 RX ADMIN — Medication 2: at 08:22

## 2020-10-17 NOTE — DISCHARGE NOTE NURSING/CASE MANAGEMENT/SOCIAL WORK - PATIENT PORTAL LINK FT
You can access the FollowMyHealth Patient Portal offered by [facility] by registering at the following website: [email or web address]. By joining [employer]’s FollowMyHealth portal, you will also be able to view your health information using other applications (apps) compatible with our system.

## 2020-10-17 NOTE — PROGRESS NOTE ADULT - SUBJECTIVE AND OBJECTIVE BOX
Patient denies chest pain or shortness of breath.   Review of systems otherwise (-)           insulin lispro (HumaLOG) corrective regimen sliding scale   SubCutaneous three times a day before meals  lidocaine 2% Gel 1 Application(s) Topical every 6 hours PRN  melatonin 3 milliGRAM(s) Oral at bedtime  morphine  - Injectable 2 milliGRAM(s) IV Push every 6 hours PRN  pantoprazole  Injectable 40 milliGRAM(s) IV Push two times a day                            8.3    6.00  )-----------( 264      ( 16 Oct 2020 06:39 )             25.2       Hemoglobin: 8.3 g/dL (10-16 @ 06:39)  Hemoglobin: 8.2 g/dL (10-15 @ 06:42)  Hemoglobin: 8.4 g/dL (10-14 @ 16:01)  Hemoglobin: 7.5 g/dL (10-14 @ 06:37)  Hemoglobin: 7.9 g/dL (10-14 @ 00:09)      10-16    132<L>  |  101  |  16  ----------------------------<  186<H>  4.2   |  28  |  1.04    Ca    8.0<L>      16 Oct 2020 06:39    TPro  5.4<L>  /  Alb  2.0<L>  /  TBili  0.9  /  DBili  x   /  AST  19  /  ALT  17  /  AlkPhos  55  10-16    Creatinine Trend: 1.04<--, 1.16<--, 1.29<--, 1.43<--, 1.84<--    COAGS:           T(C): 36.6 (10-17-20 @ 05:04), Max: 36.7 (10-16-20 @ 14:49)  HR: 82 (10-17-20 @ 05:04) (82 - 92)  BP: 142/65 (10-17-20 @ 05:04) (123/46 - 146/69)  RR: 18 (10-17-20 @ 05:04) (18 - 18)  SpO2: 96% (10-17-20 @ 05:04) (96% - 100%)  Wt(kg): --    I&O's Summary    16 Oct 2020 07:01  -  17 Oct 2020 07:00  --------------------------------------------------------  IN: 0 mL / OUT: 1030 mL / NET: -1030 mL      Gen: Appears well in NAD  HEENT:  (-)icterus (-)pallor  CV: N S1 S2 1/6 AMBREEN (+)2 Pulses B/l  Resp:  Clear to ausculatation B/L, normal effort  GI: (+) BS Soft, NT, ND  Lymph:  (+)Edema, (-)obvious lymphadenopathy  Skin: Warm to touch, Normal turgor  Psych: Appropriate mood and affect      TELEMETRY: 	  OFF        ASSESSMENT/PLAN: 	80y  Male PMH of chronic anemia, Cirrhosis, DM, chronic suprapubic catheter, chronic leg swelling, comes to the ED after a syncopal episode and suspected GI bleed.      - HD stable overnight   - Echo with no pertinent findings  - GI f/u noted  -  evaluation noted  - MOnitor H/H  - no clinical CHF  - No need for further inpatient cardiac work up.

## 2020-10-17 NOTE — PROGRESS NOTE ADULT - ATTENDING COMMENTS
CARDIOLOGY ATTENDING    Agree with above. No further inpatient cardiac workup expected.
I agree with the history / physical / assessment / plans of RICHARD Sauceda
Advanced care planning was discussed with patient and family.  Advanced care planning forms were reviewed and discussed.  Risks, benefits and alternatives of gastroenterologic procedures were discussed in detail and all questions were answered.
I reviewed patient's data and participated in the management of the patient along with Naida RAMOS as well as hematology/med oncology faculty during the daily heme/onc case review. I reviewed pertinent clinical information, PE,  labs as well as A/P as outline above, in agreement and edited as appropriate.
Patient seen and examined.     T(C): 36.7 (10-16-20 @ 14:49), Max: 36.7 (10-16-20 @ 14:49)  HR: 92 (10-16-20 @ 14:49) (92 - 92)  BP: 123/46 (10-16-20 @ 14:49) (123/46 - 123/46)  RR: 18 (10-16-20 @ 14:49) (18 - 18)  SpO2: 97% (10-16-20 @ 14:49) (97% - 97%)    Reviewed labs.    Symptomatic Anemia  Liver Cirrhosis with portal HTN and varices   KENNY   Suprapubic catheter     Out patient follow up. D/C planning.
Patient seen and examined.   T(C): 36.2 (10-15-20 @ 14:49), Max: 36.2 (10-15-20 @ 14:49)  HR: 79 (10-15-20 @ 14:58) (75 - 80)  BP: 112/75 (10-15-20 @ 14:58) (112/75 - 132/64)  RR: 18 (10-15-20 @ 14:58) (18 - 18)  SpO2: 99% (10-15-20 @ 14:58) (99% - 100%)    s/p EGD and Colonoscopy   CBC stable.   Urine cultures show pseudomonas and Citrobacter chronic joya.   Hold off abx. PT - Home with Home PT.   D/C mayuring.
Patient seen and examined.     T(C): 36.9 (10-14-20 @ 13:39), Max: 36.9 (10-14-20 @ 13:39)  HR: 79 (10-14-20 @ 13:39) (79 - 79)  BP: 134/53 (10-14-20 @ 13:39) (134/53 - 134/53)  RR: 18 (10-14-20 @ 13:39) (18 - 18)  SpO2: 100% (10-14-20 @ 13:39) (100% - 100%)    Symptomatic anemia  Acute GI Bleeding   Cirrhosis     Patient for EGD and colonoscopy am tomorrow. Monitor CBC.   Continue with iv Protonix. GI following. Cards consult appreciated. Reviewed Echo mild diastolic dysfunction.
80M from home, self-ambulating with PMH of chronic anemia, DM, chronic suprapubic catheter, chronic leg swelling, comes to the ED for dizziness associated black tarry stools x 3 days.     T(C): 36.4 (10-13-20 @ 21:19), Max: 36.8 (10-13-20 @ 16:47)  HR: 86 (10-13-20 @ 21:19) (86 - 98)  BP: 119/50 (10-13-20 @ 21:19) (119/50 - 141/54)  RR: 18 (10-13-20 @ 21:19) (18 - 18)  SpO2: 100% (10-13-20 @ 21:19) (100% - 100%)    Reviewed labs and imaging.   GI Bleed for EGD and colonoscopy.   Hep panel. Liver cirrhosis.

## 2020-10-29 NOTE — PATIENT PROFILE ADULT - LONGEST PERIOD TOBACCO-FREE
Subjective     Chief Complaint   Patient presents with   • Depression       History of Present Illness  Anxiety is high, beating him to death.   Depression is high.  I am not my happy self  Discussed Gene sight testing.     Patient is unable to take Valium with his Suboxone clinic.     Patient's PMR from outside medical facility reviewed and noted.    Review of Systems   Constitutional: Negative for chills and fever.   HENT: Negative for congestion and postnasal drip.    Respiratory: Negative for shortness of breath.    Cardiovascular: Negative for chest pain and leg swelling.   Gastrointestinal: Negative for diarrhea, nausea and vomiting.   Genitourinary: Negative for dysuria and hematuria.   Musculoskeletal: Positive for back pain.   Psychiatric/Behavioral: Positive for dysphoric mood and sleep disturbance. The patient is nervous/anxious.       Otherwise complete ROS reviewed and negative except as mentioned in the HPI.    Past Medical History:   Past Medical History:   Diagnosis Date   • Anxiety    • Depression    • Elevated cholesterol    • Hypertension      Past Surgical History:  Past Surgical History:   Procedure Laterality Date   • WOUND CLOSURE Right 5/28/2019    Procedure: Complex closure of open scalp wound avulsion defect 7x 4.5 cm with rotation/ advancement flap closure 9cm x 5 cm;  Surgeon: Fam Cardoso MD;  Location: [facility];  Service: ENT     Social History:  reports that he has been smoking cigarettes. He has a 19.00 pack-year smoking history. He has never used smokeless tobacco. He reports previous alcohol use. He reports that he does not use drugs.    Family History: family history includes Diabetes in his mother; Hyperlipidemia in his mother; Hypertension in his father and mother; Stroke in his maternal grandfather.       Allergies:  No Known Allergies  Medications:  Prior to Admission medications    Medication Sig Start Date End Date Taking? Authorizing Provider      acetaminophen (TYLENOL) 325 MG tablet Take 2 tablets by mouth Every 4 (Four) Hours As Needed for Mild Pain . 5/28/19  Yes Fam Cardoso MD   amitriptyline (ELAVIL) 25 MG tablet Take 1 tablet by mouth Every Night. 8/24/20  Yes Nella Flores DO   amLODIPine (NORVASC) 5 MG tablet Take 5 mg by mouth Daily.   Yes ProviderJanie MD   ARIPiprazole (ABILIFY) 5 MG tablet Take 5 mg by mouth Daily.   Yes ProviderJanie MD   buprenorphine-naloxone (SUBOXONE) 8-2 MG per SL tablet Place 1 tablet under the tongue Daily.   Yes Janie Bueno MD   diazePAM (Valium) 5 MG tablet Take 1 tablet by mouth At Night As Needed for Anxiety or Sleep. 6/18/20  Yes Nella Flores DO   DULoxetine (CYMBALTA) 60 MG capsule Take 60 mg by mouth Daily.   Yes Janie Bueno MD   fluticasone (FLONASE) 50 MCG/ACT nasal spray 2 sprays into the nostril(s) as directed by provider Daily. 10/13/20  Yes Nella Flores DO   gabapentin (Neurontin) 800 MG tablet Take 1 tablet by mouth 3 (Three) Times a Day. 10/22/20  Yes Nella Flores DO   hydroCHLOROthiazide (HYDRODIURIL) 25 MG tablet Take 25 mg by mouth Daily.   Yes Janie Bueno MD   ibuprofen (ADVIL,MOTRIN) 800 MG tablet Take 1 tablet by mouth 2 (Two) Times a Day. 6/18/20  Yes Nella lFores DO   lisinopril (PRINIVIL,ZESTRIL) 40 MG tablet Take 1 tablet by mouth Daily. 6/18/20  Yes Nella Flores DO   Loratadine (CLARITIN PO) Take 10 mg by mouth Daily.   Yes Janie Bueno MD   nicotine (NICODERM CQ) 21 MG/24HR patch Place 21 patches on the skin as directed by provider Daily. 1/7/20  Yes Janie Bueno MD   Omega-3 Fatty Acids (FISH OIL) 1000 MG capsule capsule Take 1,000 mg by mouth Daily With Breakfast.   Yes Janie Bueno MD   pantoprazole (Protonix) 40 MG EC tablet Take 1 tablet by mouth Daily. 3/26/20  Yes Leida Amadro APRN   sucralfate (Carafate) 1 g tablet Take 1 tablet by mouth 4 (Four) Times a Day.  "3/26/20  Yes Leida Amador APRN   traZODone (DESYREL) 100 MG tablet Take 1 tablet by mouth Every Night. 10/19/20  Yes Nella Flores,    Bacitracin-Polymyxin B ([employer] POLY BACITRACIN) [id number] UNIT/GM ointment APPLY TOPICALLY TO THE APPROPRIATE AREA AS DIRECTED 2 (TWO) TIMES A DAY FOR 7 DAYS. 6/25/19   Fam Cardoso MD   sodium-potassium-magnesium sulfates (Suprep Bowel Prep Kit) 17.5-3.13-1.6 GM/177ML solution oral solution Take 1 bottle by mouth Every 12 (Twelve) Hours. Split dose prep as directed by office instructions provided.  2 bottles = one kit. 3/26/20   Leida Amador APRN       Objective     Vital Signs: /74 (BP Location: Left arm, Patient Position: Sitting, Cuff Size: Adult)   Pulse 89   Temp 96.5 °F (35.8 °C) (Infrared)   Ht 180.3 cm (71\")   Wt 120 kg (264 lb)   SpO2 96%   BMI 36.82 kg/m²   Physical Exam  Vitals signs reviewed.   Constitutional:       Appearance: Normal appearance.   HENT:      Head: Normocephalic and atraumatic.      Nose: Nose normal. No rhinorrhea.   Eyes:      General: No scleral icterus.     Conjunctiva/sclera: Conjunctivae normal.   Neck:      Musculoskeletal: Normal range of motion and neck supple.   Cardiovascular:      Rate and Rhythm: Normal rate and regular rhythm.      Heart sounds: Normal heart sounds.   Pulmonary:      Effort: Pulmonary effort is normal.      Breath sounds: Normal breath sounds.   Musculoskeletal:         General: No tenderness or deformity.   Skin:     General: Skin is warm and dry.   Neurological:      General: No focal deficit present.      Mental Status: He is alert.      Cranial Nerves: No cranial nerve deficit.   Psychiatric:         Mood and Affect: Mood normal.         Behavior: Behavior normal.       Patient's Body mass index is 36.82 kg/m². BMI is above normal parameters. Recommendations include: nutrition counseling.      Results Reviewed:  Glucose   Date Value Ref Range Status   02/18/2020 79 74 - 109 mg/dL Final     "     BUN   Date Value Ref Range Status   02/18/2020 20 6 - 20 mg/dL Final     Creatinine   Date Value Ref Range Status   02/18/2020 0.7 0.5 - 1.2 mg/dL Final     Sodium   Date Value Ref Range Status   02/18/2020 140 136 - 145 mmol/L Final     Potassium   Date Value Ref Range Status   02/18/2020 4.4 3.5 - 5.0 mmol/L Final     Chloride   Date Value Ref Range Status   02/18/2020 102 98 - 111 mmol/L Final     CO2   Date Value Ref Range Status   02/18/2020 24 22 - 29 mmol/L Final     Calcium   Date Value Ref Range Status   02/18/2020 9.4 8.6 - 10.0 mg/dL Final     ALT (SGPT)   Date Value Ref Range Status   02/18/2020 8 5 - 41 U/L Final     AST (SGOT)   Date Value Ref Range Status   02/18/2020 12 5 - 40 U/L Final     WBC   Date Value Ref Range Status   02/18/2020 7.5 4.8 - 10.8 K/uL Final     Hematocrit   Date Value Ref Range Status   02/18/2020 39.5 (L) 42.0 - 52.0 % Final     Platelets   Date Value Ref Range Status   02/18/2020 281 130 - 400 K/uL Final     Triglycerides   Date Value Ref Range Status   02/18/2020 98 0 - 149 mg/dL Final     HDL Cholesterol   Date Value Ref Range Status   02/18/2020 44 (L) 55 - 121 mg/dL Final     Comment:     VALUES>60 MG/DL ARE ASSOCIATED WITH A DECREASED RISK OF  ATHEROSCLEROTIC CARDIOVASCULAR DISEASE     LDL Cholesterol    Date Value Ref Range Status   02/18/2020 77 <100 mg/dL Final     Comment:     <100 MG/DL=OPITIMAL    100-129 MG/DL=DESIRABLE    130-159 MG/DL BORDERLINE=INCREASED RISK OF ATHEROSCLEROTIC  CARDIOVASCULAR DISEASE    > OR = 160 MG/DL=ASSOCIATED WITH AN INCREASE RISK OF  ATHEROSCLEROTIC CARDIOVASCULAR DISEASE         Assessment / Plan     Assessment/Plan:  1. Long term use of drug  - Compliance Drug Analysis, Ur - Urine, Clean Catch    2. Anxiety  - hydrOXYzine pamoate (VISTARIL) 100 MG capsule; Take 1 capsule by mouth 3 (Three) Times a Day As Needed for Anxiety.  Dispense: 30 capsule; Refill: 1        No follow-ups on file. unless patient needs to be seen sooner or acute  issues arise.    Code Status: Full    I have discussed the patient results/orders and and plan/recommendation with them at today's visit.      Nella Flores, DO   10/29/2020         42 years

## 2021-01-02 ENCOUNTER — INPATIENT (INPATIENT)
Facility: HOSPITAL | Age: 81
LOS: 2 days | Discharge: HOME | DRG: 699 | End: 2021-01-05
Attending: INTERNAL MEDICINE | Admitting: INTERNAL MEDICINE
Payer: MEDICARE

## 2021-01-02 VITALS
HEIGHT: 72 IN | OXYGEN SATURATION: 98 % | TEMPERATURE: 97 F | DIASTOLIC BLOOD PRESSURE: 62 MMHG | SYSTOLIC BLOOD PRESSURE: 108 MMHG | HEART RATE: 70 BPM | RESPIRATION RATE: 17 BRPM

## 2021-01-02 DIAGNOSIS — E11.9 TYPE 2 DIABETES MELLITUS WITHOUT COMPLICATIONS: ICD-10-CM

## 2021-01-02 DIAGNOSIS — I85.00 ESOPHAGEAL VARICES WITHOUT BLEEDING: ICD-10-CM

## 2021-01-02 DIAGNOSIS — D64.9 ANEMIA, UNSPECIFIED: ICD-10-CM

## 2021-01-02 DIAGNOSIS — K74.60 UNSPECIFIED CIRRHOSIS OF LIVER: ICD-10-CM

## 2021-01-02 DIAGNOSIS — R53.1 WEAKNESS: ICD-10-CM

## 2021-01-02 DIAGNOSIS — R19.5 OTHER FECAL ABNORMALITIES: ICD-10-CM

## 2021-01-02 DIAGNOSIS — Z87.19 PERSONAL HISTORY OF OTHER DISEASES OF THE DIGESTIVE SYSTEM: ICD-10-CM

## 2021-01-02 DIAGNOSIS — Z29.9 ENCOUNTER FOR PROPHYLACTIC MEASURES, UNSPECIFIED: ICD-10-CM

## 2021-01-02 LAB
ALBUMIN SERPL ELPH-MCNC: 2.4 G/DL — LOW (ref 3.5–5)
ALP SERPL-CCNC: 94 U/L — SIGNIFICANT CHANGE UP (ref 40–120)
ALT FLD-CCNC: 24 U/L DA — SIGNIFICANT CHANGE UP (ref 10–60)
ANION GAP SERPL CALC-SCNC: 7 MMOL/L — SIGNIFICANT CHANGE UP (ref 5–17)
APPEARANCE UR: ABNORMAL
APTT BLD: 27.2 SEC — LOW (ref 27.5–35.5)
AST SERPL-CCNC: 24 U/L — SIGNIFICANT CHANGE UP (ref 10–40)
BASOPHILS # BLD AUTO: 0.03 K/UL — SIGNIFICANT CHANGE UP (ref 0–0.2)
BASOPHILS NFR BLD AUTO: 0.4 % — SIGNIFICANT CHANGE UP (ref 0–2)
BILIRUB SERPL-MCNC: 0.6 MG/DL — SIGNIFICANT CHANGE UP (ref 0.2–1.2)
BILIRUB UR-MCNC: NEGATIVE — SIGNIFICANT CHANGE UP
BUN SERPL-MCNC: 40 MG/DL — HIGH (ref 7–18)
CALCIUM SERPL-MCNC: 8.9 MG/DL — SIGNIFICANT CHANGE UP (ref 8.4–10.5)
CHLORIDE SERPL-SCNC: 100 MMOL/L — SIGNIFICANT CHANGE UP (ref 96–108)
CO2 SERPL-SCNC: 28 MMOL/L — SIGNIFICANT CHANGE UP (ref 22–31)
COLOR SPEC: YELLOW — SIGNIFICANT CHANGE UP
CREAT ?TM UR-MCNC: 64 MG/DL — SIGNIFICANT CHANGE UP
CREAT SERPL-MCNC: 1.38 MG/DL — HIGH (ref 0.5–1.3)
DIFF PNL FLD: ABNORMAL
EOSINOPHIL # BLD AUTO: 0.2 K/UL — SIGNIFICANT CHANGE UP (ref 0–0.5)
EOSINOPHIL NFR BLD AUTO: 2.6 % — SIGNIFICANT CHANGE UP (ref 0–6)
FERRITIN SERPL-MCNC: 162 NG/ML — SIGNIFICANT CHANGE UP (ref 30–400)
FOLATE SERPL-MCNC: 14 NG/ML — SIGNIFICANT CHANGE UP
GLUCOSE BLDC GLUCOMTR-MCNC: 196 MG/DL — HIGH (ref 70–99)
GLUCOSE SERPL-MCNC: 181 MG/DL — HIGH (ref 70–99)
GLUCOSE UR QL: NEGATIVE — SIGNIFICANT CHANGE UP
HCT VFR BLD CALC: 29.4 % — LOW (ref 39–50)
HGB BLD-MCNC: 9.2 G/DL — LOW (ref 13–17)
IMM GRANULOCYTES NFR BLD AUTO: 0.4 % — SIGNIFICANT CHANGE UP (ref 0–1.5)
INR BLD: 1.19 RATIO — HIGH (ref 0.88–1.16)
IRON SATN MFR SERPL: 11 % — LOW (ref 20–55)
IRON SATN MFR SERPL: 26 UG/DL — LOW (ref 65–170)
KETONES UR-MCNC: ABNORMAL
LEUKOCYTE ESTERASE UR-ACNC: ABNORMAL
LYMPHOCYTES # BLD AUTO: 0.33 K/UL — LOW (ref 1–3.3)
LYMPHOCYTES # BLD AUTO: 4.2 % — LOW (ref 13–44)
MCHC RBC-ENTMCNC: 27.5 PG — SIGNIFICANT CHANGE UP (ref 27–34)
MCHC RBC-ENTMCNC: 31.3 GM/DL — LOW (ref 32–36)
MCV RBC AUTO: 87.8 FL — SIGNIFICANT CHANGE UP (ref 80–100)
MONOCYTES # BLD AUTO: 0.68 K/UL — SIGNIFICANT CHANGE UP (ref 0–0.9)
MONOCYTES NFR BLD AUTO: 8.7 % — SIGNIFICANT CHANGE UP (ref 2–14)
NEUTROPHILS # BLD AUTO: 6.55 K/UL — SIGNIFICANT CHANGE UP (ref 1.8–7.4)
NEUTROPHILS NFR BLD AUTO: 83.7 % — HIGH (ref 43–77)
NITRITE UR-MCNC: POSITIVE
NRBC # BLD: 0 /100 WBCS — SIGNIFICANT CHANGE UP (ref 0–0)
OB PNL STL: POSITIVE
OSMOLALITY UR: 544 MOS/KG — SIGNIFICANT CHANGE UP (ref 50–1200)
PH UR: 6 — SIGNIFICANT CHANGE UP (ref 5–8)
PLATELET # BLD AUTO: 227 K/UL — SIGNIFICANT CHANGE UP (ref 150–400)
POTASSIUM SERPL-MCNC: 5 MMOL/L — SIGNIFICANT CHANGE UP (ref 3.5–5.3)
POTASSIUM SERPL-SCNC: 5 MMOL/L — SIGNIFICANT CHANGE UP (ref 3.5–5.3)
PROT SERPL-MCNC: 6.5 G/DL — SIGNIFICANT CHANGE UP (ref 6–8.3)
PROT UR-MCNC: 30 MG/DL
PROTHROM AB SERPL-ACNC: 14.1 SEC — HIGH (ref 10.6–13.6)
RAPID RVP RESULT: SIGNIFICANT CHANGE UP
RBC # BLD: 3.35 M/UL — LOW (ref 4.2–5.8)
RBC # FLD: 14.5 % — SIGNIFICANT CHANGE UP (ref 10.3–14.5)
SARS-COV-2 RNA SPEC QL NAA+PROBE: SIGNIFICANT CHANGE UP
SODIUM SERPL-SCNC: 135 MMOL/L — SIGNIFICANT CHANGE UP (ref 135–145)
SODIUM UR-SCNC: 81 MMOL/L — SIGNIFICANT CHANGE UP
SP GR SPEC: 1.01 — SIGNIFICANT CHANGE UP (ref 1.01–1.02)
TIBC SERPL-MCNC: 229 UG/DL — LOW (ref 250–450)
TRANSFERRIN SERPL-MCNC: 186 MG/DL — LOW (ref 200–360)
TROPONIN I SERPL-MCNC: <0.015 NG/ML — SIGNIFICANT CHANGE UP (ref 0–0.04)
UIBC SERPL-MCNC: 203 UG/DL — SIGNIFICANT CHANGE UP (ref 110–370)
UROBILINOGEN FLD QL: NEGATIVE — SIGNIFICANT CHANGE UP
VIT B12 SERPL-MCNC: >2000 PG/ML — HIGH (ref 232–1245)
WBC # BLD: 7.82 K/UL — SIGNIFICANT CHANGE UP (ref 3.8–10.5)
WBC # FLD AUTO: 7.82 K/UL — SIGNIFICANT CHANGE UP (ref 3.8–10.5)

## 2021-01-02 PROCEDURE — 70450 CT HEAD/BRAIN W/O DYE: CPT | Mod: 26

## 2021-01-02 PROCEDURE — 99285 EMERGENCY DEPT VISIT HI MDM: CPT

## 2021-01-02 PROCEDURE — 72170 X-RAY EXAM OF PELVIS: CPT | Mod: 26

## 2021-01-02 PROCEDURE — 71045 X-RAY EXAM CHEST 1 VIEW: CPT | Mod: 26

## 2021-01-02 RX ORDER — CEFTRIAXONE 500 MG/1
1000 INJECTION, POWDER, FOR SOLUTION INTRAMUSCULAR; INTRAVENOUS EVERY 24 HOURS
Refills: 0 | Status: DISCONTINUED | OUTPATIENT
Start: 2021-01-03 | End: 2021-01-05

## 2021-01-02 RX ORDER — INSULIN LISPRO 100/ML
VIAL (ML) SUBCUTANEOUS
Refills: 0 | Status: DISCONTINUED | OUTPATIENT
Start: 2021-01-02 | End: 2021-01-05

## 2021-01-02 RX ORDER — TRAMADOL HYDROCHLORIDE 50 MG/1
75 TABLET ORAL EVERY 6 HOURS
Refills: 0 | Status: DISCONTINUED | OUTPATIENT
Start: 2021-01-02 | End: 2021-01-05

## 2021-01-02 RX ORDER — INSULIN LISPRO 100/ML
VIAL (ML) SUBCUTANEOUS AT BEDTIME
Refills: 0 | Status: DISCONTINUED | OUTPATIENT
Start: 2021-01-02 | End: 2021-01-05

## 2021-01-02 RX ORDER — SODIUM CHLORIDE 9 MG/ML
1000 INJECTION INTRAMUSCULAR; INTRAVENOUS; SUBCUTANEOUS
Refills: 0 | Status: DISCONTINUED | OUTPATIENT
Start: 2021-01-02 | End: 2021-01-02

## 2021-01-02 RX ORDER — SODIUM CHLORIDE 9 MG/ML
1000 INJECTION INTRAMUSCULAR; INTRAVENOUS; SUBCUTANEOUS
Refills: 0 | Status: DISCONTINUED | OUTPATIENT
Start: 2021-01-02 | End: 2021-01-05

## 2021-01-02 RX ORDER — OXYCODONE AND ACETAMINOPHEN 5; 325 MG/1; MG/1
1 TABLET ORAL EVERY 4 HOURS
Refills: 0 | Status: DISCONTINUED | OUTPATIENT
Start: 2021-01-02 | End: 2021-01-05

## 2021-01-02 RX ORDER — CEFTRIAXONE 500 MG/1
1000 INJECTION, POWDER, FOR SOLUTION INTRAMUSCULAR; INTRAVENOUS ONCE
Refills: 0 | Status: COMPLETED | OUTPATIENT
Start: 2021-01-02 | End: 2021-01-02

## 2021-01-02 RX ORDER — CEFTRIAXONE 500 MG/1
INJECTION, POWDER, FOR SOLUTION INTRAMUSCULAR; INTRAVENOUS
Refills: 0 | Status: DISCONTINUED | OUTPATIENT
Start: 2021-01-02 | End: 2021-01-05

## 2021-01-02 RX ORDER — NADOLOL 80 MG/1
20 TABLET ORAL DAILY
Refills: 0 | Status: DISCONTINUED | OUTPATIENT
Start: 2021-01-02 | End: 2021-01-05

## 2021-01-02 RX ORDER — ACETAMINOPHEN 500 MG
650 TABLET ORAL EVERY 4 HOURS
Refills: 0 | Status: DISCONTINUED | OUTPATIENT
Start: 2021-01-02 | End: 2021-01-05

## 2021-01-02 RX ORDER — METFORMIN HYDROCHLORIDE 850 MG/1
1 TABLET ORAL
Qty: 0 | Refills: 0 | DISCHARGE

## 2021-01-02 RX ADMIN — SODIUM CHLORIDE 100 MILLILITER(S): 9 INJECTION INTRAMUSCULAR; INTRAVENOUS; SUBCUTANEOUS at 16:38

## 2021-01-02 RX ADMIN — CEFTRIAXONE 100 MILLIGRAM(S): 500 INJECTION, POWDER, FOR SOLUTION INTRAMUSCULAR; INTRAVENOUS at 16:37

## 2021-01-02 NOTE — H&P ADULT - ATTENDING COMMENTS
Patient seen and examined in ED. Patient's history, vitals, labs, imaging studies reviewed. Discussed with above resident, agree with note with edits and educated on the diagnosis and management of above medical conditions. Plan of care discussed with patient, and agrees, all questions answered.   Gina Sanchez MD  1/2/2021

## 2021-01-02 NOTE — H&P ADULT - PROBLEM SELECTOR PLAN 1
Patient presented with weakness and fall. denies any loss of consciousness.  will check orthostatic vitals.   follow CT head. Patient presented with weakness and fall. denies any loss of consciousness.  will check orthostatic vitals.   CT head did not show any acute infarct or hemorrhage.  Will continue with Antibiotics for UTI.  PT Nic

## 2021-01-02 NOTE — H&P ADULT - NSICDXPASTMEDICALHX_GEN_ALL_CORE_FT
PAST MEDICAL HISTORY:  Anemia     DM (diabetes mellitus)      PAST MEDICAL HISTORY:  Anemia     DM (diabetes mellitus)     History of suprapubic catheter

## 2021-01-02 NOTE — ED PROVIDER NOTE - OBJECTIVE STATEMENT
79 y/o M patient with a significant PMHx of anemia, DM presents to the ED s/p fall x2 days ago c/o generalized weakness. Patient endorses after fall he was unable to get up and family member assisted him on to bed. Patient states he is unable to ambulate well x1 day. Patient reports red stool x7 months, states he lives alone and does not have any home care. Patient denies any head injury or any other complaints.

## 2021-01-02 NOTE — ED ADULT NURSE NOTE - NSIMPLEMENTINTERV_GEN_ALL_ED
Implemented All Fall with Harm Risk Interventions:  [place] to call system. Call bell, personal items and telephone within reach. Instruct patient to call for assistance. Room bathroom lighting operational. Non-slip footwear when patient is off stretcher. Physically safe environment: no spills, clutter or unnecessary equipment. Stretcher in lowest position, wheels locked, appropriate side rails in place. Provide visual cue, wrist band, yellow gown, etc. Monitor gait and stability. Monitor for mental status changes and reorient to person, place, and time. Review medications for side effects contributing to fall risk. Reinforce activity limits and safety measures with patient and family. Provide visual clues: red socks.

## 2021-01-02 NOTE — ED PROVIDER NOTE - CLINICAL SUMMARY MEDICAL DECISION MAKING FREE TEXT BOX
Patient presenting for weakness, endorses brbpr, no active bleeding on exam. will obtain lab, xray, assess for bony injury, patient aox3, denies head injury, labs and reassess

## 2021-01-02 NOTE — H&P ADULT - PROBLEM SELECTOR PLAN 7
Patient was discharged on Nadolol 40 mg . will continue with 20 mg for now.   Pharmacy called to order .

## 2021-01-02 NOTE — H&P ADULT - HISTORY OF PRESENT ILLNESS
80 year old male with history of DM, Liver cirrhosis lives alone at home walks with a walker presented to ED with complains of weakness .  Patient states that he lives alone in his apartment and he was walking out of his bathroom and he fell, denies any presyncopal episode . Patient denies any head trauma although he mentioned that he was feeling too weak and he could not get up. He was sitting there for 2-3 days and was brought to hospital . aptelle also mentioned that he lives alone and he usualyy gets his things delivered. aptelle also has suprapubic cathter and states he takes care of it. Patient denies syncope, head trauma, loss of consciousness bowel or urinary problems.  Off note patient was admitted to [facility] for Symptomatic anemia and was found to have  esophageal varces.  GOC: full code 80 year old male with history of DM, Liver cirrhosis lives alone at home walks with a walker presented to ED with complains of weakness .  Patient states that he lives alone in his apartment and he was walking out of his bathroom and he fell, denies any presyncopal episode . Patient denies any head trauma although he mentioned that he was feeling too weak and he could not get up. He was sitting there for 2-3 days and was brought to hospital . aptient also mentioned that he lives alone and he usualyy gets his things delivered. aptelle also has suprapubic cathter and states he takes care of it. Patient denies syncope, head trauma, loss of consciousness bowel or urinary problems.  Off note patient was admitted to [facility] for Symptomatic anemia and was found to have  esophageal varices.  GOC: Full code

## 2021-01-02 NOTE — ED ADULT NURSE NOTE - CADM POA CENTRAL LINE
No

## 2021-01-02 NOTE — ED PROVIDER NOTE - PROGRESS NOTE DETAILS
Patient hgb stable. vital stable. endorses that he's unable to ambulate to at home, admitted for pt/ot and gi work up

## 2021-01-02 NOTE — H&P ADULT - NSHPPHYSICALEXAM_GEN_ALL_CORE
Vital Signs Last 24 Hrs  T(C): 36.1 (02 Jan 2021 15:23), Max: 36.3 (02 Jan 2021 11:25)  T(F): 97 (02 Jan 2021 15:23), Max: 97.4 (02 Jan 2021 11:25)  HR: 60 (02 Jan 2021 15:23) (60 - 70)  BP: 124/50 (02 Jan 2021 15:23) (108/62 - 124/50)  BP(mean): --  RR: 17 (02 Jan 2021 15:23) (17 - 17)  SpO2: 100% (02 Jan 2021 15:23) (98% - 100%)    GENERAL: NAD, speaks in full sentences, no signs of respiratory distress  HEAD:  Atraumatic, Normocephalic  EYES: EOMI, PERRLA, conjunctiva and sclera clear  NECK: Supple, No JVD  CHEST/LUNG: Clear to auscultation bilaterally; No wheeze; No crackles; No accessory muscles used  HEART: Regular rate and rhythm; No murmurs;   ABDOMEN: Soft, Nontender, Nondistended; Bowel sounds present; No guarding  EXTREMITIES:  2+ Peripheral Pulses, No cyanosis or edema  PSYCH:  Normal Affect  NEUROLOGY: non-focal, AAO X 3. Strength is 5/5. no sensory loss.  SKIN: No rashes or lesions Vital Signs Last 24 Hrs  T(C): 36.1 (02 Jan 2021 15:23), Max: 36.3 (02 Jan 2021 11:25)  T(F): 97 (02 Jan 2021 15:23), Max: 97.4 (02 Jan 2021 11:25)  HR: 60 (02 Jan 2021 15:23) (60 - 70)  BP: 124/50 (02 Jan 2021 15:23) (108/62 - 124/50)  BP(mean): --  RR: 17 (02 Jan 2021 15:23) (17 - 17)  SpO2: 100% (02 Jan 2021 15:23) (98% - 100%)    GENERAL: elderly male  HEAD:  Atraumatic, Normocephalic  EYES: EOMI, PERRLA, conjunctiva and sclera clear  NECK: Supple, No JVD  CHEST/LUNG: Clear to auscultation bilaterally; No wheeze; No crackles; No accessory muscles used  HEART: Regular rate and rhythm; No murmurs;   ABDOMEN: Soft, Nontender, Nondistended; Bowel sounds present; No guarding  EXTREMITIES:  2+ Peripheral Pulses, edema +1 on lower extremities  PSYCH:  Normal Affect  NEUROLOGY: non-focal, AAO X 3. no new deficts.  SKIN:  bruises on R upper extremity Vital Signs Last 24 Hrs  T(C): 36.1 (02 Jan 2021 15:23), Max: 36.3 (02 Jan 2021 11:25)  T(F): 97 (02 Jan 2021 15:23), Max: 97.4 (02 Jan 2021 11:25)  HR: 60 (02 Jan 2021 15:23) (60 - 70)  BP: 124/50 (02 Jan 2021 15:23) (108/62 - 124/50)  RR: 17 (02 Jan 2021 15:23) (17 - 17)  SpO2: 100% (02 Jan 2021 15:23) (98% - 100%)    GENERAL: elderly male  HEAD:  Atraumatic, Normocephalic  EYES: EOMI, PERRL, conjunctiva and sclera clear  NECK: Supple, No JVD  CHEST/LUNG: Clear to auscultation bilaterally; No wheeze; No crackles; No accessory muscles used  HEART: Regular rate and rhythm; No murmurs;   ABDOMEN: Soft, Nontender, Nondistended; Bowel sounds present; No guarding  EXTREMITIES:  2+ Peripheral Pulses, edema +1 on lower extremities  PSYCH:  Normal Affect  NEUROLOGY: non-focal, AAO X 3. no new deficts.  SKIN:  bruises on R upper extremity

## 2021-01-02 NOTE — ED ADULT NURSE NOTE - NURSING ED SKIN COLOR
[facility] PREOPERATIVE CONSULTATION NOTE    DATE OF SERVICE: 7/3/2018    HPI:  Paris Marquez is a 42 year oldCaucasian female living at home , using no assistive device, who is here alone for preoperative consultation.  Patient is being seen at the kind request of Dr. Stiven Casillas for the following surgical procedure: Carpal tunnel release, left on 7/9/2018 and carpal tunnel release, left on 7/23/2018 for the following indication(s): bilateral carpal tunnel syndrome.       · Planned anesthesia is IV sedation. The patient has the following known anesthesia issues: none.   · Patient has a bleeding risk of: no recent abnormal bleeding.     Other pertinent medical issues addressed today are:    Anxiety and depression.  Paris was restarted on medication for this in December 2017. She is currently taking bupropion 150 mg BID however continues to complain of depressed mood. She weaned off venlafaxine in May due to persistent hot flashes and sweating. Since stopping venlafaxine these symptoms have resolved. She also had similar symptoms with citalopram.     Paris has a long standing history of depression since her freshman year of high school. She reports 2 psychiatric admissions during this time and was diagnosed with severe depression and borderline personality disorder. She admits to having very little memory of her high school years and is not sure if she was treated for anything else. She does remember being prescribed fluoxetine, sertraline, and lithium during high school.     She was provided with referral to behavioral health several months ago however never scheduled an appointment she is now plans to schedule an appointment. Her  is very supportive but now admits that she needs to at least discuss this with a therapist.      FUNCTIONAL STATUS:  Able to complete 4 METS      REVIEW OF SYSTEMS:  As stated above.  All other systems reviewed and were negative.    Past Medical History:   Diagnosis Date    • Anxiety    • Chronic pain    • Depression    • Plantar fasciitis    • Sleep apnea     not on CPAP x 4 yrs   • Trochanteric bursitis of both hips     Followed by orthopedics, Dr. Fregoso     Past Surgical History:   Procedure Laterality Date   • Pilonidal cyst drainage  1998   • Removal gallbladder       Family History   Problem Relation Age of Onset   • Cancer Mother         ?Endometrial   • Cancer Father         Throat - smoker/ETOH   • Hypertension Maternal Aunt    • Hypertension Maternal Grandmother    • Dementia/Alzheimers Maternal Aunt    • Cystic Fibrosis Daughter    • Ulcerative Colitis Son        Current Outpatient Prescriptions   Medication Sig Dispense Refill   • buPROPion (WELLBUTRIN SR) 150 MG 12 hr tablet Start 1 tablet in the am x 3 days then take 1 tablet BID 60 tablet 1   • Zinc 50 MG Tab      • Turmeric 500 MG Tab      • Ferrous Sulfate (IRON SUPPLEMENT PO) Take 65 mg by mouth.     • Glucosamine-Chondroit-Vit C-Mn (GLUCOSAMINE CHONDR 1500 COMPLX PO)      • cholecalciferol (VITAMIN D3) 1000 UNITS tablet Take 4,000 Units by mouth daily.      • B Complex Vitamins (B COMPLEX 50 PO)      • CALCIUM CARBONATE PO Take 600 mg by mouth.      • Ascorbic Acid (VITAMIN C) 100 MG tablet Take 100 mg by mouth daily.     • MAGNESIUM OXIDE PO Take 250 mg by mouth.      • sertraline (ZOLOFT) 25 MG tablet Take 1 tablet by mouth daily. 30 tablet 1     No current facility-administered medications for this visit.        ALLERGIES:  Compazine    Immunization History   Administered Date(s) Administered   • Tdap 09/01/2016       Social History     Social History   • Marital status:      Spouse name: N/A   • Number of children: N/A   • Years of education: N/A     Social History Main Topics   • Smoking status: Never Smoker   • Smokeless tobacco: Never Used   • Alcohol use 0.6 - 1.2 oz/week     1 - 2 Standard drinks or equivalent per week      Comment: occassional   • Drug use: No   • Sexual activity: Yes      Partners: Male     Birth control/ protection: IUD     Other Topics Concern   • None     Social History Narrative   • None       EXAM:  Vitals:    07/03/18 1029   BP: 110/70   Pulse: 91   Temp: 98.4 °F (36.9 °C)   SpO2: 99%   Weight: 94.5 kg   Height: 5' 3\" (1.6 m)     Wt Readings from Last 4 Encounters:   07/03/18 94.5 kg   05/31/18 94.8 kg   05/02/18 92.8 kg   03/21/18 91.6 kg     Glucose (mg/dL)   Date Value   07/03/2018 99       General appearance: Alert, awake and no acute distress.   HEENT:  PERRLA.  EOMI.  Lids are normal.  No pallor or jaundice. Oral mucosa is pink and moist.  No ulcers or rashes.   NECK:  Supple.  No thyromegaly.  No lymph nodes palpable.   LUNGS:  Good inspiratory effort. Clear to auscultation bilaterally.  No crackles or wheezes.    CARDIOVASCULAR:  S1 and S2 heard.  Regular rate and rhythm.  No murmurs.    ABDOMEN:  Soft, nontender and nondistended.  No hepatosplenomegaly.    EXTREMITIES:  No clubbing, cyanosis or edema.  SKIN:  Normal color.  Normal temperature. No rashes.   JOINT:  No tenderness, swelling of knee or hand joints.  NEUROLOGIC:  Cranial nerves are grossly intact.  Deep tendon reflexes are present.  Power is 5/5.    PSYCH:  Mood and affect is good.     ASSESSMENT:  42 year old female here for preoperative evaluation for the following surgical procedure: Carpal tunnel release, left on 7/9/2018 and carpal tunnel release, left on 7/23/2018 for the following indication(s): bilateral carpal tunnel syndrome.       Known risk factors for perioperative complications: None.        Cardiac Risk Estimation: per the Revised Cardiac Risk Index (Circ. 100:1043, 1999), the patient's risk factors for cardiac complications include none, putting her in: RCI RISK CLASS I (0 risk factors, risk of major cardiac compl. appr. 0.5%).    1. Pre-op examination    2. Anxiety and depression    3. History of borderline personality disorder        PLAN:  Orders Placed This Encounter   • CBC & Auto  Differential   • Basic Metabolic Panel   • SERVICE TO BEHAVIORAL HEALTH   • sertraline (ZOLOFT) 25 MG tablet       1. Preoperative workup as follows: CBC, BMP.  2. Change in medication regimen before surgery: NSAID use previously discussed with patient by surgery.  3. Prophylaxis for cardiac events with perioperative beta-blockers: not indicated.  4. Invasive hemodynamic monitoring perioperatively: not indicated.  5. Anxiety and depression. Fair control. Advised to schedule appointment with behavioral health. Will add low dose sertraline today. She will notify us if she develops hot flashes again. Continue bupropion 150 mg BID.   6. History of borderline personality disorder. Referral placed to behavioral health for CBT.     Advanced Directives: No    Code Status: Full Code    RETURN:  Return if symptoms worsen or fail to improve.    Ramon Chisholm MD  7/4/2018    Thank you Dr. Casillas for this consultation and for your care of our mutual patient.    pale

## 2021-01-02 NOTE — H&P ADULT - NSHPSOCIALHISTORY_GEN_ALL_CORE
Patient lives alone, denies tobacco, Alcohol or illict drug use . Patient lives alone, denies tobacco, Alcohol or illicit drug use .

## 2021-01-02 NOTE — ED PROVIDER NOTE - CARE PLAN
Principal Discharge DX:	Weakness  Secondary Diagnosis:	Gastrointestinal hemorrhage, unspecified gastrointestinal hemorrhage type

## 2021-01-02 NOTE — H&P ADULT - ASSESSMENT
80 year old male with history of DM, Liver cirrhosis lives alone at home walks with a walker presented to ED with complains of weakness

## 2021-01-02 NOTE — H&P ADULT - PROBLEM SELECTOR PLAN 8
RISK                                                          Points  [] Previous VTE                                           3  [] Thrombophilia                                        2  [] Lower limb paralysis                              2   [] Current Cancer                                       2   [x] Immobilization > 24 hrs                        1  [] ICU/CCU stay > 24 hours                       1  [x] Age > 60                                                   1    holding dvt PPx for positive occult. will continue with SCDs

## 2021-01-02 NOTE — H&P ADULT - PROBLEM SELECTOR PLAN 2
Patient has hx of GI bleed.  EGD was done on last admission showed esophageal varices. Patient was discharged on Nadolol.  will continue with home meds

## 2021-01-03 LAB
A1C WITH ESTIMATED AVERAGE GLUCOSE RESULT: 5.7 % — HIGH (ref 4–5.6)
ALBUMIN SERPL ELPH-MCNC: 2.1 G/DL — LOW (ref 3.5–5)
ALP SERPL-CCNC: 73 U/L — SIGNIFICANT CHANGE UP (ref 40–120)
ALT FLD-CCNC: 21 U/L DA — SIGNIFICANT CHANGE UP (ref 10–60)
ANION GAP SERPL CALC-SCNC: 6 MMOL/L — SIGNIFICANT CHANGE UP (ref 5–17)
AST SERPL-CCNC: 24 U/L — SIGNIFICANT CHANGE UP (ref 10–40)
BASOPHILS # BLD AUTO: 0.03 K/UL — SIGNIFICANT CHANGE UP (ref 0–0.2)
BASOPHILS NFR BLD AUTO: 0.5 % — SIGNIFICANT CHANGE UP (ref 0–2)
BILIRUB SERPL-MCNC: 0.6 MG/DL — SIGNIFICANT CHANGE UP (ref 0.2–1.2)
BUN SERPL-MCNC: 30 MG/DL — HIGH (ref 7–18)
CALCIUM SERPL-MCNC: 8.6 MG/DL — SIGNIFICANT CHANGE UP (ref 8.4–10.5)
CHLORIDE SERPL-SCNC: 102 MMOL/L — SIGNIFICANT CHANGE UP (ref 96–108)
CK SERPL-CCNC: 197 U/L — SIGNIFICANT CHANGE UP (ref 35–232)
CO2 SERPL-SCNC: 28 MMOL/L — SIGNIFICANT CHANGE UP (ref 22–31)
CREAT SERPL-MCNC: 0.99 MG/DL — SIGNIFICANT CHANGE UP (ref 0.5–1.3)
EOSINOPHIL # BLD AUTO: 0.23 K/UL — SIGNIFICANT CHANGE UP (ref 0–0.5)
EOSINOPHIL NFR BLD AUTO: 3.5 % — SIGNIFICANT CHANGE UP (ref 0–6)
ESTIMATED AVERAGE GLUCOSE: 117 MG/DL — HIGH (ref 68–114)
FERRITIN SERPL-MCNC: 148 NG/ML — SIGNIFICANT CHANGE UP (ref 30–400)
GLUCOSE BLDC GLUCOMTR-MCNC: 141 MG/DL — HIGH (ref 70–99)
GLUCOSE BLDC GLUCOMTR-MCNC: 156 MG/DL — HIGH (ref 70–99)
GLUCOSE BLDC GLUCOMTR-MCNC: 177 MG/DL — HIGH (ref 70–99)
GLUCOSE BLDC GLUCOMTR-MCNC: 179 MG/DL — HIGH (ref 70–99)
GLUCOSE BLDC GLUCOMTR-MCNC: 232 MG/DL — HIGH (ref 70–99)
GLUCOSE SERPL-MCNC: 132 MG/DL — HIGH (ref 70–99)
HCT VFR BLD CALC: 27.9 % — LOW (ref 39–50)
HGB BLD-MCNC: 9.1 G/DL — LOW (ref 13–17)
IMM GRANULOCYTES NFR BLD AUTO: 0.5 % — SIGNIFICANT CHANGE UP (ref 0–1.5)
IRON SATN MFR SERPL: 17 % — LOW (ref 20–55)
IRON SATN MFR SERPL: 35 UG/DL — LOW (ref 65–170)
LYMPHOCYTES # BLD AUTO: 0.35 K/UL — LOW (ref 1–3.3)
LYMPHOCYTES # BLD AUTO: 5.3 % — LOW (ref 13–44)
MAGNESIUM SERPL-MCNC: 1.8 MG/DL — SIGNIFICANT CHANGE UP (ref 1.6–2.6)
MCHC RBC-ENTMCNC: 28.3 PG — SIGNIFICANT CHANGE UP (ref 27–34)
MCHC RBC-ENTMCNC: 32.6 GM/DL — SIGNIFICANT CHANGE UP (ref 32–36)
MCV RBC AUTO: 86.6 FL — SIGNIFICANT CHANGE UP (ref 80–100)
MONOCYTES # BLD AUTO: 0.59 K/UL — SIGNIFICANT CHANGE UP (ref 0–0.9)
MONOCYTES NFR BLD AUTO: 8.9 % — SIGNIFICANT CHANGE UP (ref 2–14)
NEUTROPHILS # BLD AUTO: 5.39 K/UL — SIGNIFICANT CHANGE UP (ref 1.8–7.4)
NEUTROPHILS NFR BLD AUTO: 81.3 % — HIGH (ref 43–77)
NRBC # BLD: 0 /100 WBCS — SIGNIFICANT CHANGE UP (ref 0–0)
PHOSPHATE SERPL-MCNC: 3 MG/DL — SIGNIFICANT CHANGE UP (ref 2.5–4.5)
PLATELET # BLD AUTO: 225 K/UL — SIGNIFICANT CHANGE UP (ref 150–400)
POTASSIUM SERPL-MCNC: 4.5 MMOL/L — SIGNIFICANT CHANGE UP (ref 3.5–5.3)
POTASSIUM SERPL-SCNC: 4.5 MMOL/L — SIGNIFICANT CHANGE UP (ref 3.5–5.3)
PROT SERPL-MCNC: 6 G/DL — SIGNIFICANT CHANGE UP (ref 6–8.3)
RBC # BLD: 3.22 M/UL — LOW (ref 4.2–5.8)
RBC # FLD: 14.3 % — SIGNIFICANT CHANGE UP (ref 10.3–14.5)
SARS-COV-2 IGG SERPL QL IA: NEGATIVE — SIGNIFICANT CHANGE UP
SARS-COV-2 IGM SERPL IA-ACNC: <0.1 INDEX — SIGNIFICANT CHANGE UP
SODIUM SERPL-SCNC: 136 MMOL/L — SIGNIFICANT CHANGE UP (ref 135–145)
TIBC SERPL-MCNC: 201 UG/DL — LOW (ref 250–450)
UIBC SERPL-MCNC: 166 UG/DL — SIGNIFICANT CHANGE UP (ref 110–370)
WBC # BLD: 6.62 K/UL — SIGNIFICANT CHANGE UP (ref 3.8–10.5)
WBC # FLD AUTO: 6.62 K/UL — SIGNIFICANT CHANGE UP (ref 3.8–10.5)

## 2021-01-03 RX ORDER — POLYETHYLENE GLYCOL 3350 17 G/17G
17 POWDER, FOR SOLUTION ORAL ONCE
Refills: 0 | Status: COMPLETED | OUTPATIENT
Start: 2021-01-03 | End: 2021-01-03

## 2021-01-03 RX ADMIN — Medication 5 MILLIGRAM(S): at 11:54

## 2021-01-03 RX ADMIN — POLYETHYLENE GLYCOL 3350 17 GRAM(S): 17 POWDER, FOR SOLUTION ORAL at 09:58

## 2021-01-03 RX ADMIN — CEFTRIAXONE 100 MILLIGRAM(S): 500 INJECTION, POWDER, FOR SOLUTION INTRAMUSCULAR; INTRAVENOUS at 17:49

## 2021-01-03 RX ADMIN — Medication 2: at 11:52

## 2021-01-03 RX ADMIN — Medication 1 ENEMA: at 17:49

## 2021-01-03 RX ADMIN — SODIUM CHLORIDE 85 MILLILITER(S): 9 INJECTION INTRAMUSCULAR; INTRAVENOUS; SUBCUTANEOUS at 08:59

## 2021-01-03 RX ADMIN — NADOLOL 20 MILLIGRAM(S): 80 TABLET ORAL at 05:41

## 2021-01-03 NOTE — PATIENT PROFILE ADULT - CAREGIVER ADDRESS
[id number] [place] [address]. [facility] [place] [id number]

## 2021-01-03 NOTE — PROGRESS NOTE ADULT - SUBJECTIVE AND OBJECTIVE BOX
Patient is a 80y old  Male who presents with a chief complaint of Fall (02 Jan 2021 15:46)        MEDICATIONS  (STANDING):  cefTRIAXone   IVPB      cefTRIAXone   IVPB 1000 milliGRAM(s) IV Intermittent every 24 hours  insulin lispro (ADMELOG) corrective regimen sliding scale   SubCutaneous three times a day before meals  insulin lispro (ADMELOG) corrective regimen sliding scale   SubCutaneous at bedtime  nadolol 20 milliGRAM(s) Oral daily  sodium chloride 0.9%. 1000 milliLiter(s) (85 mL/Hr) IV Continuous <Continuous>    MEDICATIONS  (PRN):  acetaminophen   Tablet .. 650 milliGRAM(s) Oral every 4 hours PRN Mild Pain (1 - 3)  bisacodyl 5 milliGRAM(s) Oral every 12 hours PRN Constipation  oxycodone    5 mG/acetaminophen 325 mG 1 Tablet(s) Oral every 4 hours PRN Moderate Pain (4 - 6)  traMADol 75 milliGRAM(s) Oral every 6 hours PRN Severe Pain (7 - 10)      REVIEW OF SYSTEMS:  CONSTITUTIONAL: No fever, weight loss, or fatigue  EYES: No eye pain, visual disturbances, or discharge  ENMT:  No difficulty hearing, tinnitus, vertigo; No sinus or throat pain  NECK: No pain or stiffness  RESPIRATORY: No cough, wheezing, chills or hemoptysis; No shortness of breath  CARDIOVASCULAR: No chest pain, palpitations, dizziness, or leg swelling  GASTROINTESTINAL: No abdominal or epigastric pain. No nausea, vomiting, or hematemesis; No diarrhea or constipation. No melena or hematochezia.  GENITOURINARY: No dysuria, frequency, hematuria, or incontinence  NEUROLOGICAL: No headaches, memory loss, loss of strength, numbness, or tremors  SKIN: No itching, burning, rashes, or lesions   LYMPH NODES: No enlarged glands  ENDOCRINE: No heat or cold intolerance; No hair loss  MUSCULOSKELETAL: No joint pain or swelling; No muscle, back, or extremity pain  PSYCHIATRIC: No depression, anxiety, mood swings, or difficulty sleeping  HEME/LYMPH: No easy bruising, or bleeding gums  ALLERY AND IMMUNOLOGIC: No hives or eczema    PHYSICAL EXAM:    T(C): 36.9 (01-03-21 @ 14:44), Max: 36.9 (01-03-21 @ 14:44)  HR: 60 (01-03-21 @ 14:44) (60 - 71)  BP: 131/56 (01-03-21 @ 14:44) (116/53 - 131/56)  RR: 18 (01-03-21 @ 14:44) (17 - 18)  SpO2: 100% (01-03-21 @ 14:44) (98% - 100%)  Wt(kg): --  I&O's Summary    02 Jan 2021 07:01  -  03 Jan 2021 07:00  --------------------------------------------------------  IN: 0 mL / OUT: 850 mL / NET: -850 mL    03 Jan 2021 07:01  -  03 Jan 2021 19:35  --------------------------------------------------------  IN: 0 mL / OUT: 700 mL / NET: -700 mL        GENERAL: NAD, well-groomed, well-developed  HEAD:  Atraumatic, Normocephalic  EYES: EOMI, PERRL, conjunctiva and sclera clear  ENMT: No tonsillar erythema, exudates, or enlargement; Moist mucous membranes, Good dentition, No lesions  NECK: Supple, No JVD, Normal thyroid  NERVOUS SYSTEM:  Alert & Oriented X3, Good concentration; Motor Strength 5/5 B/L upper and lower extremities; DTRs 2+ intact and symmetric  CHEST/LUNG: Clear to ascultation bilaterally; No rales, rhonchi, wheezing, or rubs  HEART: Regular rate and rhythm; No murmurs, rubs, or gallops  ABDOMEN: Soft, Nontender, Nondistended; Bowel sounds present  EXTREMITIES:  2+ Peripheral Pulses, No clubbing, cyanosis, or edema  LYMPH: No lymphadenopathy noted  SKIN: No rashes or lesions    LABS:                        9.1    6.62  )-----------( 225      ( 03 Jan 2021 08:25 )             27.9     01-03    136  |  102  |  30<H>  ----------------------------<  132<H>  4.5   |  28  |  0.99    Ca    8.6      03 Jan 2021 08:25  Phos  3.0     01-03  Mg     1.8     01-03    TPro  6.0  /  Alb  2.1<L>  /  TBili  0.6  /  DBili  x   /  AST  24  /  ALT  21  /  AlkPhos  73  01-03    PT/INR - ( 02 Jan 2021 12:28 )   PT: 14.1 sec;   INR: 1.19 ratio         PTT - ( 02 Jan 2021 12:28 )  PTT:27.2 sec  Urinalysis Basic - ( 02 Jan 2021 13:15 )    Color: x / Appearance: x / SG: x / pH: x  Gluc: x / Ketone: x  / Bili: x / Urobili: x   Blood: x / Protein: x / Nitrite: x   Leuk Esterase: x / RBC: 5-10 /HPF / WBC >50 /HPF   Sq Epi: x / Non Sq Epi: x / Bacteria: Many /HPF      CAPILLARY BLOOD GLUCOSE  POCT Blood Glucose.: 177 mg/dL (03 Jan 2021 16:34)  POCT Blood Glucose.: 232 mg/dL (03 Jan 2021 11:30)  POCT Blood Glucose.: 141 mg/dL (03 Jan 2021 08:10)  POCT Blood Glucose.: 179 mg/dL (03 Jan 2021 00:27)      Culture - Urine (collected 01-02-21 @ 21:46)  Source: .Urine Clean Catch (Midstream)  Preliminary Report (01-03-21 @ 17:41):    >100,000 CFU/ml Gram Negative Rods        RADIOLOGY & ADDITIONAL TESTS:    Imaging Reviewed:  [x] YES  [ ] NO    Consultant(s) Notes Reviewed:  [x] YES  [ ] NO    Care Discussed with Consultants/Other Providers [x] YES  [ ] NO Patient is a 80 year old  Male who presents with a chief complaint of Fall (02 Jan 2021 15:46)        MEDICATIONS  (STANDING):  cefTRIAXone   IVPB      cefTRIAXone   IVPB 1000 milliGRAM(s) IV Intermittent every 24 hours  insulin lispro (ADMELOG) corrective regimen sliding scale   SubCutaneous three times a day before meals  insulin lispro (ADMELOG) corrective regimen sliding scale   SubCutaneous at bedtime  nadolol 20 milliGRAM(s) Oral daily  sodium chloride 0.9%. 1000 milliLiter(s) (85 mL/Hr) IV Continuous <Continuous>    MEDICATIONS  (PRN):  acetaminophen   Tablet .. 650 milliGRAM(s) Oral every 4 hours PRN Mild Pain (1 - 3)  bisacodyl 5 milliGRAM(s) Oral every 12 hours PRN Constipation  oxycodone    5 mG/acetaminophen 325 mG 1 Tablet(s) Oral every 4 hours PRN Moderate Pain (4 - 6)  traMADol 75 milliGRAM(s) Oral every 6 hours PRN Severe Pain (7 - 10)      REVIEW OF SYSTEMS:  CONSTITUTIONAL: No fever, weight loss, or fatigue  EYES: No eye pain, visual disturbances, or discharge  ENMT:  No difficulty hearing, tinnitus, vertigo; No sinus or throat pain  NECK: No pain or stiffness  RESPIRATORY: No cough, wheezing, chills or hemoptysis; No shortness of breath  CARDIOVASCULAR: No chest pain, palpitations, dizziness, or leg swelling  GASTROINTESTINAL: No abdominal or epigastric pain. No nausea, vomiting, or hematemesis; No diarrhea or constipation. No melena or hematochezia.  GENITOURINARY: No dysuria, frequency, hematuria, or incontinence  NEUROLOGICAL: No headaches, memory loss, loss of strength, numbness, or tremors  SKIN: No itching, burning, rashes, or lesions   LYMPH NODES: No enlarged glands  ENDOCRINE: No heat or cold intolerance; No hair loss  MUSCULOSKELETAL: No joint pain or swelling; No muscle, back, or extremity pain  PSYCHIATRIC: No depression, anxiety, mood swings, or difficulty sleeping  HEME/LYMPH: No easy bruising, or bleeding gums  ALLERY AND IMMUNOLOGIC: No hives or eczema    PHYSICAL EXAM:    T(C): 36.9 (01-03-21 @ 14:44), Max: 36.9 (01-03-21 @ 14:44)  HR: 60 (01-03-21 @ 14:44) (60 - 71)  BP: 131/56 (01-03-21 @ 14:44) (116/53 - 131/56)  RR: 18 (01-03-21 @ 14:44) (17 - 18)  SpO2: 100% (01-03-21 @ 14:44) (98% - 100%)  Wt(kg): --  I&O's Summary    02 Jan 2021 07:01  -  03 Jan 2021 07:00  --------------------------------------------------------  IN: 0 mL / OUT: 850 mL / NET: -850 mL    03 Jan 2021 07:01  -  03 Jan 2021 19:35  --------------------------------------------------------  IN: 0 mL / OUT: 700 mL / NET: -700 mL        GENERAL: NAD, well-groomed, well-developed  HEAD:  Atraumatic, Normocephalic  EYES: EOMI, PERRL, conjunctiva and sclera clear  ENMT: No tonsillar erythema, exudates, or enlargement; Moist mucous membranes, Good dentition, No lesions  NECK: Supple, No JVD, Normal thyroid  NERVOUS SYSTEM:  Alert & Oriented X3, Good concentration; Motor Strength 5/5 B/L upper and lower extremities; DTRs 2+ intact and symmetric  CHEST/LUNG: Clear to ascultation bilaterally; No rales, rhonchi, wheezing, or rubs  HEART: Regular rate and rhythm; No murmurs, rubs, or gallops  ABDOMEN: Soft, Nontender, Nondistended; Bowel sounds present  EXTREMITIES:  2+ Peripheral Pulses, No clubbing, cyanosis, or edema  LYMPH: No lymphadenopathy noted  SKIN: No rashes or lesions    LABS:                        9.1    6.62  )-----------( 225      ( 03 Jan 2021 08:25 )             27.9     01-03    136  |  102  |  30<H>  ----------------------------<  132<H>  4.5   |  28  |  0.99    Ca    8.6      03 Jan 2021 08:25  Phos  3.0     01-03  Mg     1.8     01-03    TPro  6.0  /  Alb  2.1<L>  /  TBili  0.6  /  DBili  x   /  AST  24  /  ALT  21  /  AlkPhos  73  01-03    PT/INR - ( 02 Jan 2021 12:28 )   PT: 14.1 sec;   INR: 1.19 ratio         PTT - ( 02 Jan 2021 12:28 )  PTT:27.2 sec  Urinalysis Basic - ( 02 Jan 2021 13:15 )    Color: x / Appearance: x / SG: x / pH: x  Gluc: x / Ketone: x  / Bili: x / Urobili: x   Blood: x / Protein: x / Nitrite: x   Leuk Esterase: x / RBC: 5-10 /HPF / WBC >50 /HPF   Sq Epi: x / Non Sq Epi: x / Bacteria: Many /HPF      CAPILLARY BLOOD GLUCOSE  POCT Blood Glucose.: 177 mg/dL (03 Jan 2021 16:34)  POCT Blood Glucose.: 232 mg/dL (03 Jan 2021 11:30)  POCT Blood Glucose.: 141 mg/dL (03 Jan 2021 08:10)  POCT Blood Glucose.: 179 mg/dL (03 Jan 2021 00:27)      Culture - Urine (collected 01-02-21 @ 21:46)  Source: .Urine Clean Catch (Midstream)  Preliminary Report (01-03-21 @ 17:41):    >100,000 CFU/ml Gram Negative Rods        RADIOLOGY & ADDITIONAL TESTS:    Imaging Reviewed:  [x] YES  [ ] NO    Consultant(s) Notes Reviewed:  [x] YES  [ ] NO    Care Discussed with Consultants/Other Providers [x] YES  [ ] NO Patient is a 80 year old  Male who presents with a chief complaint of Fall (02 Jan 2021 15:46)    Patient seen and examined earlier, reports he feels better    MEDICATIONS  (STANDING):  cefTRIAXone   IVPB      cefTRIAXone   IVPB 1000 milliGRAM(s) IV Intermittent every 24 hours  insulin lispro (ADMELOG) corrective regimen sliding scale   SubCutaneous three times a day before meals  insulin lispro (ADMELOG) corrective regimen sliding scale   SubCutaneous at bedtime  nadolol 20 milliGRAM(s) Oral daily  sodium chloride 0.9%. 1000 milliLiter(s) (85 mL/Hr) IV Continuous <Continuous>    MEDICATIONS  (PRN):  acetaminophen   Tablet .. 650 milliGRAM(s) Oral every 4 hours PRN Mild Pain (1 - 3)  bisacodyl 5 milliGRAM(s) Oral every 12 hours PRN Constipation  oxycodone    5 mG/acetaminophen 325 mG 1 Tablet(s) Oral every 4 hours PRN Moderate Pain (4 - 6)  traMADol 75 milliGRAM(s) Oral every 6 hours PRN Severe Pain (7 - 10)      REVIEW OF SYSTEMS:  CONSTITUTIONAL: No fever, weight loss, has fatigue  EYES: No eye pain, visual disturbances, or discharge  ENMT:  No difficulty hearing, tinnitus, vertigo; No sinus or throat pain  NECK: No pain or stiffness  RESPIRATORY: No cough, wheezing, chills or hemoptysis; No shortness of breath  CARDIOVASCULAR: No chest pain, palpitations, dizziness, or leg swelling  GASTROINTESTINAL: No abdominal or epigastric pain. No nausea, vomiting, or hematemesis; No diarrhea or constipation. No melena or hematochezia.  GENITOURINARY: No dysuria, frequency, hematuria, or incontinence  NEUROLOGICAL: No headaches, memory loss, loss of strength, numbness, or tremors  SKIN: No itching, burning, rashes, or lesions   ENDOCRINE: No heat or cold intolerance; No hair loss  MUSCULOSKELETAL: No joint pain or swelling; No muscle, back, or extremity pain  PSYCHIATRIC: No depression, anxiety, mood swings, or difficulty sleeping  HEME/LYMPH: No easy bruising, or bleeding gums  ALLERGY AND IMMUNOLOGIC: No hives or eczema    PHYSICAL EXAM:    T(C): 36.9 (01-03-21 @ 14:44), Max: 36.9 (01-03-21 @ 14:44)  HR: 60 (01-03-21 @ 14:44) (60 - 71)  BP: 131/56 (01-03-21 @ 14:44) (116/53 - 131/56)  RR: 18 (01-03-21 @ 14:44) (17 - 18)  SpO2: 100% (01-03-21 @ 14:44) (98% - 100%)    I&O's Summary    02 Jan 2021 07:01  -  03 Jan 2021 07:00  --------------------------------------------------------  IN: 0 mL / OUT: 850 mL / NET: -850 mL    03 Jan 2021 07:01  -  03 Jan 2021 19:35  --------------------------------------------------------  IN: 0 mL / OUT: 700 mL / NET: -700 mL        GENERAL: NAD, well-groomed, well-developed  HEAD:  Atraumatic, Normocephalic  EYES: EOMI, PERRL, conjunctiva and sclera clear  ENMT:moist mucous membranes  NECK: Supple, No JVD, Normal thyroid  NERVOUS SYSTEM:  Alert & Oriented X3, no focal deficit  CHEST/LUNG: Clear to ascultation bilaterally; No rales, rhonchi, wheezing, or rubs  HEART: Regular rate and rhythm; No murmurs, rubs, or gallops  ABDOMEN: Soft, Nontender, Nondistended; Bowel sounds present  EXTREMITIES:  2+ Peripheral Pulses, No clubbing, cyanosis, or edema  SKIN: No rash, ecchymosis on extremities        LABS:                        9.1    6.62  )-----------( 225      ( 03 Jan 2021 08:25 )             27.9     01-03    136  |  102  |  30<H>  ----------------------------<  132<H>  4.5   |  28  |  0.99    Ca    8.6      03 Jan 2021 08:25  Phos  3.0     01-03  Mg     1.8     01-03    TPro  6.0  /  Alb  2.1<L>  /  TBili  0.6  /  DBili  x   /  AST  24  /  ALT  21  /  AlkPhos  73  01-03    PT/INR - ( 02 Jan 2021 12:28 )   PT: 14.1 sec;   INR: 1.19 ratio         PTT - ( 02 Jan 2021 12:28 )  PTT:27.2 sec  Urinalysis Basic - ( 02 Jan 2021 13:15 )    Color: x / Appearance: x / SG: x / pH: x  Gluc: x / Ketone: x  / Bili: x / Urobili: x   Blood: x / Protein: x / Nitrite: x   Leuk Esterase: x / RBC: 5-10 /HPF / WBC >50 /HPF   Sq Epi: x / Non Sq Epi: x / Bacteria: Many /HPF      CAPILLARY BLOOD GLUCOSE  POCT Blood Glucose.: 177 mg/dL (03 Jan 2021 16:34)  POCT Blood Glucose.: 232 mg/dL (03 Jan 2021 11:30)  POCT Blood Glucose.: 141 mg/dL (03 Jan 2021 08:10)  POCT Blood Glucose.: 179 mg/dL (03 Jan 2021 00:27)      Culture - Urine (collected 01-02-21 @ 21:46)  Source: .Urine Clean Catch (Midstream)  Preliminary Report (01-03-21 @ 17:41):    >100,000 CFU/ml Gram Negative Rods        RADIOLOGY & ADDITIONAL TESTS:    Imaging Reviewed:  [x] YES  [ ] NO    Consultant(s) Notes Reviewed:  [x] YES  [ ] NO    Care Discussed with Consultants/Other Providers [x] YES  [ ] NO

## 2021-01-03 NOTE — PROGRESS NOTE ADULT - ASSESSMENT
80 year old male with history of DM, Liver cirrhosis lives alone at home walks with a walker presented to ED with complains of weakness      Problem/Plan - 1:  ·  Problem: Weakness.  Plan: Patient presented with weakness and fall. denies any loss of consciousness.  will check orthostatic vitals.   CT head did not show any acute infarct or hemorrhage.  Will continue with Antibiotics for UTI.  PT Eval.      Problem/Plan - 2:  ·  Problem: History of GI bleed.  Plan: Patient has hx of GI bleed.  EGD was done on last admission showed esophageal varices. Patient was discharged on Nadolol.  will continue with home meds.      Problem/Plan - 3:  ·  Problem: DM (diabetes mellitus).  Plan: Patient on  Metformin at home . will continue with sliding scale .  Follow HbA1c.      Problem/Plan - 4:  ·  Problem: Anemia.  Plan: Patient Hb is low.  Hb 9.2  follow Iron panel.     Problem/Plan - 5:  ·  Problem: Occult blood positive stool.  Plan: will monitor cbc.   no active bleeding noted.  Transfusion consent in chart.      Problem/Plan - 6:  Problem: Cirrhosis. Plan: Patient has hx of liver cirrhosis.     Problem/Plan - 7:  ·  Problem: Varices, esophageal.  Plan: Patient was discharged on Nadolol 40 mg . will continue with 20 mg for now.   Pharmacy called to order .      Problem/Plan - 8:  ·  Problem: Prophylactic measure.  Plan: RISK                                                          Points  [] Previous VTE                                           3  [] Thrombophilia                                        2  [] Lower limb paralysis                              2   [] Current Cancer                                       2   [x] Immobilization > 24 hrs                        1  [] ICU/CCU stay > 24 hours                       1  [x] Age > 60                                                   1    holding dvt PPx for positive occult. will continue with SCDs.    80 year old male with history of DM, Liver cirrhosis lives alone at home walks with a walker presented to ED with complains of weakness      Problem/Plan - 1:  ·  Problem: Weakness.  Plan: Patient presented with weakness and fall, denies any loss of consciousness.  f/u orthostatics  CT head did not show any acute infarct or hemorrhage.  Will continue with Antibiotics for UTI.  PT Eval.      Problem/Plan - 2:  ·  Problem: History of GI bleed.  Plan: Patient has hx of GI bleed.  EGD was done on last admission showed esophageal varices. Patient was discharged on Nadolol.  will continue with home meds.      Problem/Plan - 3:  ·  Problem: DM (diabetes mellitus).  Plan: Patient on  Metformin at home . will continue with sliding scale .  Follow HbA1c.      Problem/Plan - 4:  ·  Problem: Anemia.  Plan: monitor H/H, currently stable  follow Iron panel.     Problem/Plan - 5:  ·  Problem: Occult blood positive stool.  Plan: will monitor cbc.   no active bleeding noted.  Transfusion consent in chart.      Problem/Plan - 6:  Problem: Cirrhosis. Plan: Patient has hx of liver cirrhosis.     Problem/Plan - 7:  ·  Problem: Varices, esophageal.  Plan: Patient was discharged on Nadolol 40 mg, will continue with 20 mg for now.   Pharmacy called to order .      Problem/Plan - 8:  ·  Problem: Prophylactic measure.  Plan: RISK                                                          Points  [] Previous VTE                                           3  [] Thrombophilia                                        2  [] Lower limb paralysis                              2   [] Current Cancer                                       2   [x] Immobilization > 24 hrs                        1  [] ICU/CCU stay > 24 hours                       1  [x] Age > 60                                                   1    holding dvt PPx for positive occult. will continue with SCDs.

## 2021-01-04 ENCOUNTER — TRANSCRIPTION ENCOUNTER (OUTPATIENT)
Age: 81
End: 2021-01-04

## 2021-01-04 LAB
ALBUMIN SERPL ELPH-MCNC: 2.1 G/DL — LOW (ref 3.5–5)
ALP SERPL-CCNC: 77 U/L — SIGNIFICANT CHANGE UP (ref 40–120)
ALT FLD-CCNC: 22 U/L DA — SIGNIFICANT CHANGE UP (ref 10–60)
ANION GAP SERPL CALC-SCNC: 8 MMOL/L — SIGNIFICANT CHANGE UP (ref 5–17)
AST SERPL-CCNC: 25 U/L — SIGNIFICANT CHANGE UP (ref 10–40)
BASOPHILS # BLD AUTO: 0.02 K/UL — SIGNIFICANT CHANGE UP (ref 0–0.2)
BASOPHILS NFR BLD AUTO: 0.3 % — SIGNIFICANT CHANGE UP (ref 0–2)
BILIRUB SERPL-MCNC: 0.6 MG/DL — SIGNIFICANT CHANGE UP (ref 0.2–1.2)
BUN SERPL-MCNC: 22 MG/DL — HIGH (ref 7–18)
CALCIUM SERPL-MCNC: 8.4 MG/DL — SIGNIFICANT CHANGE UP (ref 8.4–10.5)
CHLORIDE SERPL-SCNC: 101 MMOL/L — SIGNIFICANT CHANGE UP (ref 96–108)
CO2 SERPL-SCNC: 25 MMOL/L — SIGNIFICANT CHANGE UP (ref 22–31)
CREAT SERPL-MCNC: 0.96 MG/DL — SIGNIFICANT CHANGE UP (ref 0.5–1.3)
EOSINOPHIL # BLD AUTO: 0.24 K/UL — SIGNIFICANT CHANGE UP (ref 0–0.5)
EOSINOPHIL NFR BLD AUTO: 3.3 % — SIGNIFICANT CHANGE UP (ref 0–6)
GLUCOSE BLDC GLUCOMTR-MCNC: 139 MG/DL — HIGH (ref 70–99)
GLUCOSE BLDC GLUCOMTR-MCNC: 147 MG/DL — HIGH (ref 70–99)
GLUCOSE BLDC GLUCOMTR-MCNC: 157 MG/DL — HIGH (ref 70–99)
GLUCOSE BLDC GLUCOMTR-MCNC: 195 MG/DL — HIGH (ref 70–99)
GLUCOSE SERPL-MCNC: 134 MG/DL — HIGH (ref 70–99)
HCT VFR BLD CALC: 28.8 % — LOW (ref 39–50)
HGB BLD-MCNC: 9.2 G/DL — LOW (ref 13–17)
IMM GRANULOCYTES NFR BLD AUTO: 0.3 % — SIGNIFICANT CHANGE UP (ref 0–1.5)
LYMPHOCYTES # BLD AUTO: 0.48 K/UL — LOW (ref 1–3.3)
LYMPHOCYTES # BLD AUTO: 6.7 % — LOW (ref 13–44)
MCHC RBC-ENTMCNC: 27.8 PG — SIGNIFICANT CHANGE UP (ref 27–34)
MCHC RBC-ENTMCNC: 31.9 GM/DL — LOW (ref 32–36)
MCV RBC AUTO: 87 FL — SIGNIFICANT CHANGE UP (ref 80–100)
MONOCYTES # BLD AUTO: 0.72 K/UL — SIGNIFICANT CHANGE UP (ref 0–0.9)
MONOCYTES NFR BLD AUTO: 10 % — SIGNIFICANT CHANGE UP (ref 2–14)
NEUTROPHILS # BLD AUTO: 5.69 K/UL — SIGNIFICANT CHANGE UP (ref 1.8–7.4)
NEUTROPHILS NFR BLD AUTO: 79.4 % — HIGH (ref 43–77)
NRBC # BLD: 0 /100 WBCS — SIGNIFICANT CHANGE UP (ref 0–0)
PLATELET # BLD AUTO: 219 K/UL — SIGNIFICANT CHANGE UP (ref 150–400)
POTASSIUM SERPL-MCNC: 4.7 MMOL/L — SIGNIFICANT CHANGE UP (ref 3.5–5.3)
POTASSIUM SERPL-SCNC: 4.7 MMOL/L — SIGNIFICANT CHANGE UP (ref 3.5–5.3)
PROT SERPL-MCNC: 6.1 G/DL — SIGNIFICANT CHANGE UP (ref 6–8.3)
RBC # BLD: 3.31 M/UL — LOW (ref 4.2–5.8)
RBC # FLD: 14.4 % — SIGNIFICANT CHANGE UP (ref 10.3–14.5)
SODIUM SERPL-SCNC: 134 MMOL/L — LOW (ref 135–145)
WBC # BLD: 7.17 K/UL — SIGNIFICANT CHANGE UP (ref 3.8–10.5)
WBC # FLD AUTO: 7.17 K/UL — SIGNIFICANT CHANGE UP (ref 3.8–10.5)

## 2021-01-04 RX ORDER — HEPARIN SODIUM 5000 [USP'U]/ML
5000 INJECTION INTRAVENOUS; SUBCUTANEOUS EVERY 12 HOURS
Refills: 0 | Status: DISCONTINUED | OUTPATIENT
Start: 2021-01-04 | End: 2021-01-05

## 2021-01-04 RX ADMIN — NADOLOL 20 MILLIGRAM(S): 80 TABLET ORAL at 06:08

## 2021-01-04 RX ADMIN — Medication 1: at 11:56

## 2021-01-04 RX ADMIN — HEPARIN SODIUM 5000 UNIT(S): 5000 INJECTION INTRAVENOUS; SUBCUTANEOUS at 17:29

## 2021-01-04 RX ADMIN — CEFTRIAXONE 100 MILLIGRAM(S): 500 INJECTION, POWDER, FOR SOLUTION INTRAMUSCULAR; INTRAVENOUS at 17:29

## 2021-01-04 NOTE — ADVANCED PRACTICE NURSE CONSULT - ASSESSMENT
This is a 80yr old male patient admitted for Weakness, presenting with the following:  -There is a Stage 1 Pressure Injury to the Bilateral Heels, as evident by non-blanchable erythema  -There is evidence of scabbed and open Skin Tears to the Bilateral Upper and Lower Extremities  -There is evidence of Incontinence Associated Dermatitis to the Perianal and Bilateral Gluteus with maceration

## 2021-01-04 NOTE — PROGRESS NOTE ADULT - SUBJECTIVE AND OBJECTIVE BOX
PGY-1 Progress Note discussed with attending    PAGER #: [-----] TILL 5:00 PM  PLEASE CONTACT ON CALL TEAM:  - On Call Team (Please refer to Alex) FROM 5:00 PM - 8:30PM  - Nightfloat Team FROM 8:30 -7:30 AM      INTERVAL HPI/OVERNIGHT EVENTS: No acute events overnight.    MEDICATIONS:  acetaminophen   Tablet .. 650 milliGRAM(s) Oral every 4 hours PRN  bisacodyl 5 milliGRAM(s) Oral every 12 hours PRN  cefTRIAXone   IVPB      cefTRIAXone   IVPB 1000 milliGRAM(s) IV Intermittent every 24 hours  heparin   Injectable 5000 Unit(s) IV Push every 12 hours  insulin lispro (ADMELOG) corrective regimen sliding scale   SubCutaneous three times a day before meals  insulin lispro (ADMELOG) corrective regimen sliding scale   SubCutaneous at bedtime  nadolol 20 milliGRAM(s) Oral daily  oxycodone    5 mG/acetaminophen 325 mG 1 Tablet(s) Oral every 4 hours PRN  sodium chloride 0.9%. 1000 milliLiter(s) IV Continuous <Continuous>  traMADol 75 milliGRAM(s) Oral every 6 hours PRN      REVIEW OF SYSTEMS:  CONSTITUTIONAL: No fever, weight loss, or fatigue  RESPIRATORY: No cough, wheezing, chills or hemoptysis; No shortness of breath  CARDIOVASCULAR: No chest pain, palpitations, dizziness, or leg swelling  GASTROINTESTINAL: No abdominal pain. No nausea, vomiting, or hematemesis; No diarrhea or constipation. No melena or hematochezia.  GENITOURINARY: No dysuria or hematuria, urinary frequency  NEUROLOGICAL: No headaches, memory loss, loss of strength, numbness, or tremors  SKIN: No itching, burning, rashes, or lesions     Vital Signs Last 24 Hrs  T(C): 36.7 (04 Jan 2021 05:41), Max: 36.9 (03 Jan 2021 14:44)  T(F): 98.1 (04 Jan 2021 05:41), Max: 98.4 (03 Jan 2021 14:44)  HR: 75 (04 Jan 2021 05:41) (60 - 75)  BP: 129/52 (04 Jan 2021 05:41) (129/52 - 140/57)  BP(mean): --  RR: 16 (04 Jan 2021 05:41) (16 - 18)  SpO2: 100% (04 Jan 2021 05:41) (100% - 100%)    PHYSICAL EXAMINATION:  GENERAL: elderly male  HEAD:  Atraumatic, Normocephalic  EYES: EOMI, PERRL, conjunctiva and sclera clear  NECK: Supple, No JVD  CHEST/LUNG: Clear to auscultation bilaterally; No wheeze; No crackles; No accessory muscles used  HEART: Regular rate and rhythm; No murmurs;   ABDOMEN: Soft, Nontender, Nondistended; Bowel sounds present; No guarding  EXTREMITIES:  2+ Peripheral Pulses, edema +1 on lower extremities  PSYCH:  Normal Affect  NEUROLOGY: non-focal, AAO X 3. no new deficts.  SKIN:  bruises on R upper extremity                          9.2    7.17  )-----------( 219      ( 04 Jan 2021 05:56 )             28.8     01-04    134<L>  |  101  |  22<H>  ----------------------------<  134<H>  4.7   |  25  |  0.96    Ca    8.4      04 Jan 2021 05:56  Phos  3.0     01-03  Mg     1.8     01-03    TPro  6.1  /  Alb  2.1<L>  /  TBili  0.6  /  DBili  x   /  AST  25  /  ALT  22  /  AlkPhos  77  01-04    LIVER FUNCTIONS - ( 04 Jan 2021 05:56 )  Alb: 2.1 g/dL / Pro: 6.1 g/dL / ALK PHOS: 77 U/L / ALT: 22 U/L DA / AST: 25 U/L / GGT: x           CARDIAC MARKERS ( 03 Jan 2021 08:25 )  x     / x     / 197 U/L / x     / x              CAPILLARY BLOOD GLUCOSE      RADIOLOGY & ADDITIONAL TESTS:                   PGY-1 Progress Note discussed with attending    PAGER #: [-----] TILL 5:00 PM  PLEASE CONTACT ON CALL TEAM:  - On Call Team (Please refer to Alex) FROM 5:00 PM - 8:30PM  - Nightfloat Team FROM 8:30 -7:30 AM      INTERVAL HPI/OVERNIGHT EVENTS: No acute events overnight.    MEDICATIONS:  acetaminophen   Tablet .. 650 milliGRAM(s) Oral every 4 hours PRN  bisacodyl 5 milliGRAM(s) Oral every 12 hours PRN  cefTRIAXone   IVPB      cefTRIAXone   IVPB 1000 milliGRAM(s) IV Intermittent every 24 hours  heparin   Injectable 5000 Unit(s) IV Push every 12 hours  insulin lispro (ADMELOG) corrective regimen sliding scale   SubCutaneous three times a day before meals  insulin lispro (ADMELOG) corrective regimen sliding scale   SubCutaneous at bedtime  nadolol 20 milliGRAM(s) Oral daily  oxycodone    5 mG/acetaminophen 325 mG 1 Tablet(s) Oral every 4 hours PRN  sodium chloride 0.9%. 1000 milliLiter(s) IV Continuous <Continuous>  traMADol 75 milliGRAM(s) Oral every 6 hours PRN      REVIEW OF SYSTEMS:  CONSTITUTIONAL: No fever, weight loss, has fatigue  RESPIRATORY: No cough, wheezing, chills or hemoptysis; No shortness of breath  CARDIOVASCULAR: No chest pain, palpitations, dizziness, or leg swelling  GASTROINTESTINAL: No abdominal pain. No nausea, vomiting, or hematemesis; No diarrhea or constipation. No melena or hematochezia.  GENITOURINARY: No dysuria or hematuria, urinary frequency  NEUROLOGICAL: No headaches, memory loss, loss of strength, numbness, or tremors  SKIN: No itching, burning, rashes, or lesions   All other ROS reviewed and negative    Vital Signs Last 24 Hrs  T(C): 36.7 (04 Jan 2021 05:41), Max: 36.9 (03 Jan 2021 14:44)  T(F): 98.1 (04 Jan 2021 05:41), Max: 98.4 (03 Jan 2021 14:44)  HR: 75 (04 Jan 2021 05:41) (60 - 75)  BP: 129/52 (04 Jan 2021 05:41) (129/52 - 140/57)  RR: 16 (04 Jan 2021 05:41) (16 - 18)  SpO2: 100% (04 Jan 2021 05:41) (100% - 100%)    PHYSICAL EXAMINATION:  GENERAL: elderly male  HEAD:  Atraumatic, Normocephalic  EYES: EOMI, PERRL, conjunctiva and sclera clear  NECK: Supple, No JVD  CHEST/LUNG: Clear to auscultation bilaterally; No wheeze; No crackles; No accessory muscles used  HEART: Regular rate and rhythm; No murmurs;   ABDOMEN: Soft, Nontender, Nondistended; Bowel sounds present; No guarding  EXTREMITIES:  2+ Peripheral Pulses, edema +1 on lower extremities  PSYCH:  Normal Affect  NEUROLOGY: non-focal, AAO X 3., no new deficits  SKIN:  bruises on upper extremities                          9.2    7.17  )-----------( 219      ( 04 Jan 2021 05:56 )             28.8     01-04    134<L>  |  101  |  22<H>  ----------------------------<  134<H>  4.7   |  25  |  0.96    Ca    8.4      04 Jan 2021 05:56  Phos  3.0     01-03  Mg     1.8     01-03    TPro  6.1  /  Alb  2.1<L>  /  TBili  0.6  /  DBili  x   /  AST  25  /  ALT  22  /  AlkPhos  77  01-04    LIVER FUNCTIONS - ( 04 Jan 2021 05:56 )  Alb: 2.1 g/dL / Pro: 6.1 g/dL / ALK PHOS: 77 U/L / ALT: 22 U/L DA / AST: 25 U/L / GGT: x           CARDIAC MARKERS ( 03 Jan 2021 08:25 )  x     / x     / 197 U/L / x     / x          RADIOLOGY & ADDITIONAL TESTS:

## 2021-01-04 NOTE — DISCHARGE NOTE PROVIDER - HOSPITAL COURSE
80 year old male with history of DM, Liver cirrhosis lives alone at home walks with a walker presented to ED with complains of weakness .  Patient states that he lives alone in his apartment and he was walking out of his bathroom and he fell, denies any presyncopal episode . Patient denies any head trauma although he mentioned that he was feeling too weak and he could not get up. He was sitting there for 2-3 days and was brought to hospital . aptelle also mentioned that he lives alone and he usualyy gets his things delivered. aptelle also has suprapubic cathter and states he takes care of it. Patient denies syncope, head trauma, loss of consciousness bowel or urinary problems. In hospital, CT head did not show any acute infarct or hemorrhage. Orthostatic vitals was normal. Physical therapist saw you and recommended PT. UA was positive. Rocephin was started. Urine culture came back positive. .....................    As per attending, Patient is clinically stable for discharge. 80 year old male with history of DM, Liver cirrhosis lives alone at home walks with a walker presented to ED with complains of weakness .  Patient states that he lives alone in his apartment and he was walking out of his bathroom and he fell, denies any presyncopal episode . Patient denies any head trauma although he mentioned that he was feeling too weak and he could not get up. He was sitting there for 2-3 days and was brought to hospital . aptelle also mentioned that he lives alone and he usualyy gets his things delivered. aptelle also has suprapubic cathter and states he takes care of it. Patient denies syncope, head trauma, loss of consciousness bowel or urinary problems. In hospital, CT head did not show any acute infarct or hemorrhage. Orthostatic vitals was normal. Physical therapist saw you and recommended PT. UA was positive. Rocephin was started. Urine culture came back positive. Patient completed 3 days of Rocephin. As per attending, Patient is clinically stable for discharge. 80 year old male with history of DM, Liver cirrhosis, lives alone at home walks with a walker presented to ED with complains of weakness.  Patient states that he lives alone in his apartment and he was walking out of his bathroom and he fell, denies any presyncopal episode. Patient denies any head trauma although he mentioned that he was feeling too weak and he could not get up. He was sitting there for 2-3 days and was brought to hospital. Patient also mentioned that he lives alone and he usably gets his things delivered. Patient also has suprapubic cathter and states he takes care of it. Patient denies syncope, head trauma, loss of consciousness bowel or urinary problems. In hospital, CT head did not show any acute infarct or hemorrhage. Orthostatic vitals was normal. Physical therapist saw you and recommended PT. UA was positive. IV Rocephin was started. Urine culture came back positive for contamination. Patient completed 3 days of IV Rocephin. As per attending, Patient is clinically stable for discharge.

## 2021-01-04 NOTE — DISCHARGE NOTE PROVIDER - NSDCCPCAREPLAN_GEN_ALL_CORE_FT
PRINCIPAL DISCHARGE DIAGNOSIS  Diagnosis: Weakness  Assessment and Plan of Treatment: You presented with weakness that caused your fall in bathroom. CT head didn't show any acute findings. Orthostat vitals was normal. Your K+ is normal in hospital. Physical therapist recommeded home PT.      SECONDARY DISCHARGE DIAGNOSES  Diagnosis: DM (diabetes mellitus)  Assessment and Plan of Treatment: You have history of diabetes. You were placed on sliding scale. Your blood suger was well controlled in hopsital. Please continue on your medications as prescribed and follow up with your doctor.    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 You were on nadolol since before. We continued same meds. You didn't have hematemsis in hospital. Please take your medications a sprescribed and follow up with your doctor.     PRINCIPAL DISCHARGE DIAGNOSIS  Diagnosis: Weakness  Assessment and Plan of Treatment: You presented with weakness that caused your fall in bathroom. CT head didn't show any acute findings. Orthostat vitals was normal. Your K+ is normal in hospital. Physical therapist recommeded home PT.      SECONDARY DISCHARGE DIAGNOSES  Diagnosis: UTI (urinary tract infection)  Assessment and Plan of Treatment: Urinanalysis was positive. Antibiotics was started. Urine culture came back positive for negative rods. Completed 3 days on Rocephin. Please take your medications as prescribed and follow up with PCP.    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 You were on nadolol since before. We continued same meds. You didn't have hematemsis in hospital. Please take your medications a sprescribed and follow up with your doctor.    Diagnosis: DM (diabetes mellitus)  Assessment and Plan of Treatment: You have history of diabetes. You were placed on sliding scale. Your blood suger was well controlled in [facility]. Please continue on your medications as prescribed and follow up with your doctor.     PRINCIPAL DISCHARGE DIAGNOSIS  Diagnosis: Weakness  Assessment and Plan of Treatment: You presented with weakness that caused your fall in bathroom. CT head didn't show any acute findings. Orthostat vitals was normal. Your K+ is normal. Physical therapist recommeded home PT.      SECONDARY DISCHARGE DIAGNOSES  Diagnosis: UTI (urinary tract infection)  Assessment and Plan of Treatment: Urinanalysis was positive. Antibiotics was started. Urine culture came back positive for negative rods. Completed 3 days on Rocephin. Please take your medications as prescribed and follow up with PCP.    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 You were on nadolol since before. We continued same meds. You didn't have hematemsis in hospital. Please take your medications a sprescribed and follow up with your doctor.    Diagnosis: DM (diabetes mellitus)  Assessment and Plan of Treatment: You have history of diabetes. You were placed on sliding scale. [facility]. Please continue on your medications as prescribed and follow up with your doctor.     PRINCIPAL DISCHARGE DIAGNOSIS  Diagnosis: Weakness  Assessment and Plan of Treatment: You presented with weakness that caused your fall in bathroom. CT head didn't show any acute findings. Orthostat vitals was normal. Your K+ is normal. Physical therapist recommeded home PT.      SECONDARY DISCHARGE DIAGNOSES  Diagnosis: UTI (urinary tract infection)  Assessment and Plan of Treatment: Urinanalysis was positive. Antibiotics was started. Urine culture came back positive for negative rods. Completed 3 days on Rocephin. Please take your medications as prescribed ( Ceftin 1gm for 3 days ) and follow up with PCP.    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 You were on nadolol since before. We continued same meds. You didn't have hematemsis in hospital. Please take your medications a sprescribed and follow up with your doctor.    Diagnosis: DM (diabetes mellitus)  Assessment and Plan of Treatment: You have history of diabetes. You were placed on sliding scale. Your blood suger was well controlled in hopsital. Please continue on your medications as prescribed and follow up with your doctor.     PRINCIPAL DISCHARGE DIAGNOSIS  Diagnosis: Weakness  Assessment and Plan of Treatment: You presented with weakness that caused your fall in bathroom. CT head didn't show any acute findings. Orthostat vitals was normal. Your K+ is normal. Physical therapist recommeded home PT.      SECONDARY DISCHARGE DIAGNOSES  Diagnosis: KENNY (acute kidney injury)  Assessment and Plan of Treatment: resolved    Diagnosis: UTI (urinary tract infection)  Assessment and Plan of Treatment: Urinanalysis was positive. Antibiotics was started. Urine culture came back positive for negative rods. Completed 3 days on Rocephin. Please take your medications as prescribed ( Ceftin 1gm for 3 days ) and follow up with PCP.    Diagnosis: DM (diabetes mellitus)  Assessment and Plan of Treatment: You have history of diabetes. You were placed on sliding scale. Your blood suger was well controlled in [facility]. Please continue on your medications as prescribed and follow up with your doctor.    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 You were on nadolol since before. We continued same meds. You didn't have hematemsis in hospital. Please take your medications a sprescribed and follow up with your doctor.

## 2021-01-04 NOTE — ADVANCED PRACTICE NURSE CONSULT - RECOMMEDATIONS
-Clean all affected areas with warm water, mild soap, pat dry, and apply skin prep to the surrounding skin  -Frequent toileting  -Apply Bacitracin ointment to the Bilateral Upper and Lower Extremities and cover with a non-adherent dry dressing Daily PRN  -Apply TRIAD Moisture Barrier Cream to the Perianal and Bilateral Gluteal areas b.i.d. PRN  -Elevate/float the patients heels using heel protectors and reposition the patient Q 2hrs using wedges or pillows

## 2021-01-04 NOTE — DISCHARGE NOTE PROVIDER - CARE PROVIDER_API CALL
Gina Sanchez  GERIATRIC MEDICINE  [address] [place] [address]  Phone: [phone]  Fax: [phone]  Follow Up Time:

## 2021-01-04 NOTE — DISCHARGE NOTE PROVIDER - NSDCFUADDAPPT_GEN_ALL_CORE_FT
Please follow up with Dr. Sanchez outpatient.  - Please follow up with Dr. Sanchez outpatient and hold diuretics for now until reviewing your  meds.   - Please follow up with your PMD as outpatient and hold diuretics for now until reviewing your  meds.

## 2021-01-04 NOTE — PROGRESS NOTE ADULT - PROBLEM SELECTOR PLAN 1
Patient presented with weakness and fall. denies any loss of consciousness.  f/u Orthostat vitals    CT head did not show any acute infarct or hemorrhage.  Will continue with Antibiotics for UTI.  PT Nic Patient presented with weakness and fall. denies any loss of consciousness.  f/u Orthostatic vitals    CT head did not show any acute infarct or hemorrhage.  Will continue with Antibiotics for UTI.  PT Nic

## 2021-01-05 ENCOUNTER — TRANSCRIPTION ENCOUNTER (OUTPATIENT)
Age: 81
End: 2021-01-05

## 2021-01-05 VITALS
SYSTOLIC BLOOD PRESSURE: 138 MMHG | RESPIRATION RATE: 17 BRPM | DIASTOLIC BLOOD PRESSURE: 49 MMHG | HEART RATE: 59 BPM | TEMPERATURE: 98 F | OXYGEN SATURATION: 100 %

## 2021-01-05 DIAGNOSIS — N39.0 URINARY TRACT INFECTION, SITE NOT SPECIFIED: ICD-10-CM

## 2021-01-05 DIAGNOSIS — Z51.5 ENCOUNTER FOR PALLIATIVE CARE: ICD-10-CM

## 2021-01-05 DIAGNOSIS — E43 UNSPECIFIED SEVERE PROTEIN-CALORIE MALNUTRITION: ICD-10-CM

## 2021-01-05 DIAGNOSIS — R53.81 OTHER MALAISE: ICD-10-CM

## 2021-01-05 LAB
CULTURE RESULTS: SIGNIFICANT CHANGE UP
GLUCOSE BLDC GLUCOMTR-MCNC: 133 MG/DL — HIGH (ref 70–99)
GLUCOSE BLDC GLUCOMTR-MCNC: 165 MG/DL — HIGH (ref 70–99)
SPECIMEN SOURCE: SIGNIFICANT CHANGE UP

## 2021-01-05 PROCEDURE — 86850 RBC ANTIBODY SCREEN: CPT

## 2021-01-05 PROCEDURE — 84100 ASSAY OF PHOSPHORUS: CPT

## 2021-01-05 PROCEDURE — 82550 ASSAY OF CK (CPK): CPT

## 2021-01-05 PROCEDURE — 85610 PROTHROMBIN TIME: CPT

## 2021-01-05 PROCEDURE — 0225U NFCT DS DNA&RNA 21 SARSCOV2: CPT

## 2021-01-05 PROCEDURE — 93005 ELECTROCARDIOGRAM TRACING: CPT

## 2021-01-05 PROCEDURE — 86901 BLOOD TYPING SEROLOGIC RH(D): CPT

## 2021-01-05 PROCEDURE — 72170 X-RAY EXAM OF PELVIS: CPT

## 2021-01-05 PROCEDURE — 70450 CT HEAD/BRAIN W/O DYE: CPT

## 2021-01-05 PROCEDURE — 82962 GLUCOSE BLOOD TEST: CPT

## 2021-01-05 PROCEDURE — 86769 SARS-COV-2 COVID-19 ANTIBODY: CPT

## 2021-01-05 PROCEDURE — 83036 HEMOGLOBIN GLYCOSYLATED A1C: CPT

## 2021-01-05 PROCEDURE — 80053 COMPREHEN METABOLIC PANEL: CPT

## 2021-01-05 PROCEDURE — 82570 ASSAY OF URINE CREATININE: CPT

## 2021-01-05 PROCEDURE — 71045 X-RAY EXAM CHEST 1 VIEW: CPT

## 2021-01-05 PROCEDURE — 99223 1ST HOSP IP/OBS HIGH 75: CPT | Mod: 25

## 2021-01-05 PROCEDURE — 85025 COMPLETE CBC W/AUTO DIFF WBC: CPT

## 2021-01-05 PROCEDURE — 82607 VITAMIN B-12: CPT

## 2021-01-05 PROCEDURE — 84300 ASSAY OF URINE SODIUM: CPT

## 2021-01-05 PROCEDURE — 99497 ADVNCD CARE PLAN 30 MIN: CPT | Mod: 25

## 2021-01-05 PROCEDURE — 82728 ASSAY OF FERRITIN: CPT

## 2021-01-05 PROCEDURE — 84466 ASSAY OF TRANSFERRIN: CPT

## 2021-01-05 PROCEDURE — 83550 IRON BINDING TEST: CPT

## 2021-01-05 PROCEDURE — 86900 BLOOD TYPING SEROLOGIC ABO: CPT

## 2021-01-05 PROCEDURE — 83935 ASSAY OF URINE OSMOLALITY: CPT

## 2021-01-05 PROCEDURE — 81001 URINALYSIS AUTO W/SCOPE: CPT

## 2021-01-05 PROCEDURE — 97162 PT EVAL MOD COMPLEX 30 MIN: CPT

## 2021-01-05 PROCEDURE — 36415 COLL VENOUS BLD VENIPUNCTURE: CPT

## 2021-01-05 PROCEDURE — 87086 URINE CULTURE/COLONY COUNT: CPT

## 2021-01-05 PROCEDURE — 83540 ASSAY OF IRON: CPT

## 2021-01-05 PROCEDURE — 82272 OCCULT BLD FECES 1-3 TESTS: CPT

## 2021-01-05 PROCEDURE — 84484 ASSAY OF TROPONIN QUANT: CPT

## 2021-01-05 PROCEDURE — 99285 EMERGENCY DEPT VISIT HI MDM: CPT | Mod: 25

## 2021-01-05 PROCEDURE — 82746 ASSAY OF FOLIC ACID SERUM: CPT

## 2021-01-05 PROCEDURE — 83735 ASSAY OF MAGNESIUM: CPT

## 2021-01-05 PROCEDURE — 85730 THROMBOPLASTIN TIME PARTIAL: CPT

## 2021-01-05 RX ORDER — CEFUROXIME AXETIL 250 MG
1 TABLET ORAL
Qty: 6 | Refills: 0
Start: 2021-01-05 | End: 2021-01-07

## 2021-01-05 RX ORDER — ACETAMINOPHEN 500 MG
2 TABLET ORAL
Qty: 60 | Refills: 0
Start: 2021-01-05 | End: 2021-01-09

## 2021-01-05 RX ORDER — TRAMADOL HYDROCHLORIDE 50 MG/1
1.5 TABLET ORAL
Qty: 0 | Refills: 0 | DISCHARGE
Start: 2021-01-05

## 2021-01-05 RX ORDER — TRAMADOL HYDROCHLORIDE 50 MG/1
1 TABLET ORAL
Qty: 14 | Refills: 0
Start: 2021-01-05 | End: 2021-01-11

## 2021-01-05 RX ORDER — FUROSEMIDE 40 MG
1 TABLET ORAL
Qty: 0 | Refills: 0 | DISCHARGE

## 2021-01-05 RX ADMIN — Medication 1: at 12:16

## 2021-01-05 RX ADMIN — HEPARIN SODIUM 5000 UNIT(S): 5000 INJECTION INTRAVENOUS; SUBCUTANEOUS at 05:36

## 2021-01-05 RX ADMIN — NADOLOL 20 MILLIGRAM(S): 80 TABLET ORAL at 05:36

## 2021-01-05 NOTE — CONSULT NOTE ADULT - SUBJECTIVE AND OBJECTIVE BOX
HPI:  80 year old male with history of DM, Liver cirrhosis lives alone at home walks with a walker presented to ED with complains of weakness .  Patient states that he lives alone in his apartment and he was walking out of his bathroom and he fell, denies any presyncopal episode . Patient denies any head trauma although he mentioned that he was feeling too weak and he could not get up. He was sitting there for 2-3 days and was brought to hospital . maribell also mentioned that he lives alone and he usualyy gets his things delivered. maribell also has suprapubic cathter and states he takes care of it. Patient denies syncope, head trauma, loss of consciousness bowel or urinary problems.  Off note patient was admitted to [facility] for Symptomatic anemia and was found to have  esophageal varices.  GOC: Full code (02 Jan 2021 15:46)      PAST MEDICAL & SURGICAL HISTORY:  History of suprapubic catheter    Anemia    DM (diabetes mellitus)    No significant past surgical history        SOCIAL HISTORY:    Admitted from:  home assisted living [facility]   Substance abuse history:              Tobacco hx:                  Alcohol hx:              Home Opioid hx:  Hinduism:                                    Preferred Language:    Surrogate/HCP/Guardian:            Phone#:    FAMILY HISTORY:  No pertinent family history in first degree relatives      Baseline ADLs (prior to admission):    Allergies    No Known Allergies    Intolerances      Present Symptoms:   Dyspnea:   Nausea/Vomiting:   Anxiety:  Depressed   Fatigue:  Loss of appetite:   Pain:                                location:          Review of Systems: [All others negative or Unable to obtain due to poor mentation]    MEDICATIONS  (STANDING):  cefTRIAXone   IVPB      cefTRIAXone   IVPB 1000 milliGRAM(s) IV Intermittent every 24 hours  heparin   Injectable 5000 Unit(s) IV Push every 12 hours  insulin lispro (ADMELOG) corrective regimen sliding scale   SubCutaneous three times a day before meals  insulin lispro (ADMELOG) corrective regimen sliding scale   SubCutaneous at bedtime  nadolol 20 milliGRAM(s) Oral daily  sodium chloride 0.9%. 1000 milliLiter(s) (85 mL/Hr) IV Continuous <Continuous>    MEDICATIONS  (PRN):  acetaminophen   Tablet .. 650 milliGRAM(s) Oral every 4 hours PRN Mild Pain (1 - 3)  bisacodyl 5 milliGRAM(s) Oral every 12 hours PRN Constipation  oxycodone    5 mG/acetaminophen 325 mG 1 Tablet(s) Oral every 4 hours PRN Moderate Pain (4 - 6)  traMADol 75 milliGRAM(s) Oral every 6 hours PRN Severe Pain (7 - 10)      PHYSICAL EXAM:    Vital Signs Last 24 Hrs  T(C): 36.6 (05 Jan 2021 05:15), Max: 36.9 (04 Jan 2021 14:09)  T(F): 97.9 (05 Jan 2021 05:15), Max: 98.4 (04 Jan 2021 14:09)  HR: 63 (05 Jan 2021 05:15) (61 - 70)  BP: 131/40 (05 Jan 2021 05:15) (114/83 - 158/64)  BP(mean): --  RR: 17 (05 Jan 2021 05:15) (16 - 17)  SpO2: 99% (05 Jan 2021 05:15) (99% - 99%)    General: alert  oriented x ____    [lethargic distressed cachexia  nonverbal  unarousable verbal]  Karnofsky Performance Score/Palliative Performance Status Version2:     %    HEENT: normal  dry mouth  ET tube/trach oral lesions:  Lungs: comfortable tachypnea/labored breathing  excessive secretions  CV: normal  tachycardia  GI: normal  distended  tender  incontinent               PEG/NG/OG tube  constipation  last BM:   : normal  incontinent  oliguria/anuria  joya  Musculoskeletal: normal  weakness  edema             ambulatory  bedbound/wheelchair bound  Skin: normal  pressure ulcers: stage: edema: other:  Neuro: no deficits cognitive impairment dsyphagia/dysarthria paresis: other:  Oral intake ability: unable/only mouth care [minimal moderate full capability]  Diet: [NPO]    LABS:                        9.2    7.17  )-----------( 219      ( 04 Jan 2021 05:56 )             28.8     01-04    134<L>  |  101  |  22<H>  ----------------------------<  134<H>  4.7   |  25  |  0.96    Ca    8.4      04 Jan 2021 05:56    TPro  6.1  /  Alb  2.1<L>  /  TBili  0.6  /  DBili  x   /  AST  25  /  ALT  22  /  AlkPhos  77  01-04        RADIOLOGY & ADDITIONAL STUDIES:    ADVANCE DIRECTIVES:    HPI:  80 year old male with history of DM, Liver cirrhosis lives alone at home walks with a walker presented to ED with complains of weakness .  Patient states that he lives alone in his apartment and he was walking out of his bathroom and he fell, denies any presyncopal episode . Patient denies any head trauma although he mentioned that he was feeling too weak and he could not get up. He was sitting there for 2-3 days and was brought to hospital . aptelle also mentioned that he lives alone and he usualyy gets his things delivered. aptelle also has suprapubic cathter and states he takes care of it. Patient denies syncope, head trauma, loss of consciousness bowel or urinary problems.  Off note patient was admitted to [facility] for Symptomatic anemia and was found to have  esophageal varices.    Interval hx: Pt seen and examined at the bedside, AOX3.  NAD. Palliative care to establish GOC      PAST MEDICAL & SURGICAL HISTORY:  History of suprapubic catheter    Anemia    DM (diabetes mellitus)    No significant past surgical history        SOCIAL HISTORY:    Admitted from:  Home alone  Pt is , his wife Tracy is currently in [facility], [place].  He assigned Ekta Doe as his HCP.     Denominational:    Christianity                                Preferred Language: English    Surrogate/HCP/Guardian:  Ekta Doe          Phone#: [phone]    FAMILY HISTORY:  No pertinent family history in first degree relatives      Baseline ADLs (prior to admission): independent    Allergies    No Known Allergies    Intolerances      Present Symptoms:   Dyspnea: denies  Nausea/Vomiting: denies  Fatigue: denies  Loss of appetite: denies  Pain:   denies                                     Review of Systems: [All others negative     MEDICATIONS  (STANDING):  cefTRIAXone   IVPB      cefTRIAXone   IVPB 1000 milliGRAM(s) IV Intermittent every 24 hours  heparin   Injectable 5000 Unit(s) IV Push every 12 hours  insulin lispro (ADMELOG) corrective regimen sliding scale   SubCutaneous three times a day before meals  insulin lispro (ADMELOG) corrective regimen sliding scale   SubCutaneous at bedtime  nadolol 20 milliGRAM(s) Oral daily  sodium chloride 0.9%. 1000 milliLiter(s) (85 mL/Hr) IV Continuous <Continuous>    MEDICATIONS  (PRN):  acetaminophen   Tablet .. 650 milliGRAM(s) Oral every 4 hours PRN Mild Pain (1 - 3)  bisacodyl 5 milliGRAM(s) Oral every 12 hours PRN Constipation  oxycodone    5 mG/acetaminophen 325 mG 1 Tablet(s) Oral every 4 hours PRN Moderate Pain (4 - 6)  traMADol 75 milliGRAM(s) Oral every 6 hours PRN Severe Pain (7 - 10)      PHYSICAL EXAM:    Vital Signs Last 24 Hrs  T(C): 36.6 (05 Jan 2021 05:15), Max: 36.9 (04 Jan 2021 14:09)  T(F): 97.9 (05 Jan 2021 05:15), Max: 98.4 (04 Jan 2021 14:09)  HR: 63 (05 Jan 2021 05:15) (61 - 70)  BP: 131/40 (05 Jan 2021 05:15) (114/83 - 158/64)  BP(mean): --  RR: 17 (05 Jan 2021 05:15) (16 - 17)  SpO2: 99% (05 Jan 2021 05:15) (99% - 99%)    General: alert  oriented x ____    [lethargic distressed cachexia  nonverbal  unarousable verbal]  Karnofsky Performance Score/Palliative Performance Status Version2:     %    HEENT: normal  dry mouth  ET tube/trach oral lesions:  Lungs: comfortable tachypnea/labored breathing  excessive secretions  CV: normal  tachycardia  GI: normal  distended  tender  incontinent               PEG/NG/OG tube  constipation  last BM:   : normal  incontinent  oliguria/anuria  joya  Musculoskeletal: normal  weakness  edema             ambulatory  bedbound/wheelchair bound  Skin: normal  pressure ulcers: stage: edema: other:  Neuro: no deficits cognitive impairment dsyphagia/dysarthria paresis: other:  Oral intake ability: unable/only mouth care [minimal moderate full capability]  Diet: [NPO]    LABS:                        9.2    7.17  )-----------( 219      ( 04 Jan 2021 05:56 )             28.8     01-04    134<L>  |  101  |  22<H>  ----------------------------<  134<H>  4.7   |  25  |  0.96    Ca    8.4      04 Jan 2021 05:56    TPro  6.1  /  Alb  2.1<L>  /  TBili  0.6  /  DBili  x   /  AST  25  /  ALT  22  /  AlkPhos  77  01-04        RADIOLOGY & ADDITIONAL STUDIES:    ADVANCE DIRECTIVES:    HPI:  80 year old male with history of DM, Liver cirrhosis lives alone at home walks with a walker presented to ED with complains of weakness .  Patient states that he lives alone in his apartment and he was walking out of his bathroom and he fell, denies any presyncopal episode . Patient denies any head trauma although he mentioned that he was feeling too weak and he could not get up. He was sitting there for 2-3 days and was brought to hospital . aptelle also mentioned that he lives alone and he usualyy gets his things delivered. aptelle also has suprapubic cathter and states he takes care of it. Patient denies syncope, head trauma, loss of consciousness bowel or urinary problems.  Off note patient was admitted to [facility] for Symptomatic anemia and was found to have  esophageal varices.    Interval hx: Pt seen and examined at the bedside, AOX3.  NAD. Palliative care to establish GOC      PAST MEDICAL & SURGICAL HISTORY:  History of suprapubic catheter    Anemia    DM (diabetes mellitus)    No significant past surgical history        SOCIAL HISTORY:    Admitted from:  Home alone  Pt is , his wife Tracy is currently in [facility].  He assigned Ekta Doe as his HCP.     Oriental orthodox:    Adventism                                Preferred Language: English    Surrogate/HCP/Guardian:  Ekta Doe          Phone#: [phone]    FAMILY HISTORY:  No pertinent family history in first degree relatives      Baseline ADLs (prior to admission): independent    Allergies    No Known Allergies    Intolerances      Present Symptoms:   Dyspnea: denies  Nausea/Vomiting: denies  Fatigue: denies  Loss of appetite: denies  Pain:   denies                                     Review of Systems: [All others negative     MEDICATIONS  (STANDING):  cefTRIAXone   IVPB      cefTRIAXone   IVPB 1000 milliGRAM(s) IV Intermittent every 24 hours  heparin   Injectable 5000 Unit(s) IV Push every 12 hours  insulin lispro (ADMELOG) corrective regimen sliding scale   SubCutaneous three times a day before meals  insulin lispro (ADMELOG) corrective regimen sliding scale   SubCutaneous at bedtime  nadolol 20 milliGRAM(s) Oral daily  sodium chloride 0.9%. 1000 milliLiter(s) (85 mL/Hr) IV Continuous <Continuous>    MEDICATIONS  (PRN):  acetaminophen   Tablet .. 650 milliGRAM(s) Oral every 4 hours PRN Mild Pain (1 - 3)  bisacodyl 5 milliGRAM(s) Oral every 12 hours PRN Constipation  oxycodone    5 mG/acetaminophen 325 mG 1 Tablet(s) Oral every 4 hours PRN Moderate Pain (4 - 6)  traMADol 75 milliGRAM(s) Oral every 6 hours PRN Severe Pain (7 - 10)      PHYSICAL EXAM:    Vital Signs Last 24 Hrs  T(C): 36.6 (05 Jan 2021 05:15), Max: 36.9 (04 Jan 2021 14:09)  T(F): 97.9 (05 Jan 2021 05:15), Max: 98.4 (04 Jan 2021 14:09)  HR: 63 (05 Jan 2021 05:15) (61 - 70)  BP: 131/40 (05 Jan 2021 05:15) (114/83 - 158/64)  BP(mean): --  RR: 17 (05 Jan 2021 05:15) (16 - 17)  SpO2: 99% (05 Jan 2021 05:15) (99% - 99%)    General: Elderly man AOX3.  NAD  Karnofsky Performance Score/Palliative Performance Status Version2: 40    %    HEENT: atraumatic, moist mucous memebrane  Lungs: CTA on RA  CV: S1S2, RRR  GI: soft non tender + BS  : +supra pubic catheter   Musculoskeletal: b/l LE edema  Skin: stage 1 pressure injury b/l heels;  multiple scabbed abrasions to b/l UE  Neuro: no deficits cognitive impairment   Oral intake ability: moderate po intake      LABS:                        9.2    7.17  )-----------( 219      ( 04 Jan 2021 05:56 )             28.8     01-04    134<L>  |  101  |  22<H>  ----------------------------<  134<H>  4.7   |  25  |  0.96    Ca    8.4      04 Jan 2021 05:56    TPro  6.1  /  Alb  2.1<L>  /  TBili  0.6  /  DBili  x   /  AST  25  /  ALT  22  /  AlkPhos  77  01-04    < from: CT Head No Cont (01.02.21 @ 18:20) >    EXAM:  CT BRAIN                            PROCEDURE DATE:  01/02/2021          INTERPRETATION:  CLINICAL INDICATION: Weakness. After fall.    Technique:    Multiple contiguous axial images were acquired from the skull base to the vertex without the administration of intravenous contrast. Coronal and sagittal reformations were made.    COMPARISON: None available at this time.    FINDINGS:  Moderate prominence of the sulci and ventricles are consistent with age-appropriate volume loss.  There are hemispheric white matter areas of low attenuation which are nonspecific but likely related to sequelae of microvascular disease.  There is no intraparenchymal hematoma, mass effect or midline shift. No abnormal extra-axial fluid collections are present. The basal cisterns are patent.    The calvarium is intact.  Bilateral cataract surgery.  Visualized paranasal sinuses appear free of acute disease.  Mastoid air cells are clear.    IMPRESSION:    Volume loss with microvascular disease, no acute hemorrhage or midline shift. If symptoms persist consider follow-up head CT or MR if no contraindications.    < end of copied text >      RADIOLOGY & ADDITIONAL STUDIES: Reviewed    ADVANCE DIRECTIVES: MOLST; DNR/DNI/no feeding tube

## 2021-01-05 NOTE — CONSULT NOTE ADULT - CONVERSATION DETAILS
Met with the patient at the bedside, AOX3 at the time of exam, discussed his clinical condition and goals of care.  He stated he has always followed up with his doctors, he is aware of all of his health issues.  He shared his wife is currently living at [facility] with terminal diagnosis of brain cancer. Discussed the role of Health Care Proxy.  He identified Mrs. Ekta Doe who is a distant relative.  HCP document completed and placed in the chart.  Discussed the risks vs benefits of cardiopulmonary resuscitation, intubation and artificial nutrition in context of advanced illness. He stated "I have lived a good life, at my age I do not want all those invasive interventions, I want to die naturally".  MANOJ drafted DNR/DNI/No feeding tube.    Discussed the benefits of home visiting doctor, of which is appreciative to have on board.  Dr. Matias aware.  Spoke with his HCP, updated her as to our discussion, she support his decisions.   Chaplaincy support offered and accepted.  Referral made.   All questions answered.  Support provided.

## 2021-01-05 NOTE — CONSULT NOTE ADULT - ATTENDING COMMENTS
80 y M hx DM, cirrhosis adm s/p mechanical fall. Ambulatory w walker. PT rowena noted, recomm home PT. Lives alone. Has designated relative as HCP Ekta Doe. Now DNR/DNI per his wishes. To return home w homecare once medically stable.

## 2021-01-05 NOTE — CONSULT NOTE ADULT - PROBLEM SELECTOR RECOMMENDATION 3
Pt reports ambulatory with a walker; increased weakness s/p fall.   Hypoalbuminemia high risk for skin failure.  Supportive care.  Pt recommending home PT    Xray pelvis no acute fracture or dislocation  CT head did not show any acute infarct or hemorrhage.

## 2021-01-05 NOTE — DISCHARGE NOTE NURSING/CASE MANAGEMENT/SOCIAL WORK - NSDCFUADDAPPT_GEN_ALL_CORE_FT
- Please follow up with Dr. Sanchez outpatient and hold diuretics for now until reviewing your  meds.

## 2021-01-05 NOTE — DISCHARGE NOTE NURSING/CASE MANAGEMENT/SOCIAL WORK - PATIENT PORTAL LINK FT
You can access the FollowMyHealth Patient Portal offered by [facility] by registering at the following website: [email or web address]. By joining [facility]’s FollowMyHealth portal, you will also be able to view your health information using other applications (apps) compatible with our system.

## 2022-06-30 NOTE — CONSULT NOTE ADULT - PROBLEM SELECTOR PROBLEM 1
HR=58 bpm, IBXS=176/68 mmhg, SpO2=99.0 %, Resp=20 B/min, EtCO2=32 mmHg, Apnea=0 Seconds, Joe=10, Comment=SR Cirrhosis

## 2022-11-22 NOTE — PATIENT PROFILE ADULT - BRADEN NUTRITION
Discharge medications reviewed at bedside with dad. All medications labeled with the correct dosage and amount to be given. Signs and symptoms to look for reviewed. All questions answered. Dad had a good understanding of all information provided. Provided dad with schedule that was cross checked with RN at bedside. Dad has home medications, provided new medications and an additional bottle of sildenafil. Steroid Ointment for penis was not included in refilled medications. We provided dad with tube that was used in house for home. Refill will be provided at outpatient clinic next week. Follow up clinics reviewed, dad to schedule PCP appointment in the next month as well as neurology appointment for December. Numbers to call were provided. Dad expressed comfort with all home equipment. He will continue to check 02 sats a few times daily and with any concerns.    (3) adequate

## 2024-04-24 NOTE — ED PROVIDER NOTE - CADM POA CENTRAL LINE
PDMP reviewed.  No inconsistencies.   Rx sent.     
Requested Renewals     lacosamide (VIMPAT) 100 MG Tab         Sig: Take 1 tablet by mouth in the morning and 1 tablet in the evening. Increased dose as of 3/20/24    Disp: 60 tablet    Refills: 3    Start: 4/23/2024    Class: Eprescribe    Non-formulary    Last ordered: 1 month ago (3/20/2024) by Vidal Talbert MD    Patient comment: please make prescription for 90 days.  I have 1 week left.       To be filled at: [id number] [place], [place] - [address] AT [facility] [address]            LOV: 8.8.23  Pending appointment: 5.22.24      Last Rx issued on 3.20.24 for #60 with 3R  Pt is requesting 90 day supply.  Please advise.  
No

## 2024-05-13 NOTE — DIETITIAN INITIAL EVALUATION ADULT. - ETIOLOGY
1
inadequate intake with altered GI function with s/p GI bleeding, h/o cirrhosis,  lack of social support

## 2024-07-24 NOTE — ED ADULT NURSE NOTE - CHIEF COMPLAINT
Addended by: KATE BRUNO on: 7/24/2024 02:03 PM     Modules accepted: Orders    
The patient is a 80y Male complaining of rib pain/injury.